# Patient Record
Sex: MALE | Race: WHITE | NOT HISPANIC OR LATINO | Employment: OTHER | URBAN - METROPOLITAN AREA
[De-identification: names, ages, dates, MRNs, and addresses within clinical notes are randomized per-mention and may not be internally consistent; named-entity substitution may affect disease eponyms.]

---

## 2021-12-30 PROBLEM — M48.062 NEUROGENIC CLAUDICATION DUE TO LUMBAR SPINAL STENOSIS: Status: ACTIVE | Noted: 2021-12-30

## 2022-01-03 ENCOUNTER — HOSPITAL ENCOUNTER (OUTPATIENT)
Dept: CARDIOLOGY | Facility: MEDICAL CENTER | Age: 72
End: 2022-01-03
Attending: NEUROLOGICAL SURGERY
Payer: COMMERCIAL

## 2022-01-03 ENCOUNTER — HOSPITAL ENCOUNTER (OUTPATIENT)
Dept: LAB | Facility: MEDICAL CENTER | Age: 72
End: 2022-01-03
Attending: NEUROLOGICAL SURGERY
Payer: COMMERCIAL

## 2022-01-03 ENCOUNTER — HOSPITAL ENCOUNTER (OUTPATIENT)
Dept: RADIOLOGY | Facility: MEDICAL CENTER | Age: 72
End: 2022-01-03
Attending: NEUROLOGICAL SURGERY
Payer: COMMERCIAL

## 2022-01-03 DIAGNOSIS — Z01.810 PRE-OPERATIVE CARDIOVASCULAR EXAMINATION: ICD-10-CM

## 2022-01-03 DIAGNOSIS — Z01.811 PRE-OPERATIVE RESPIRATORY EXAMINATION: ICD-10-CM

## 2022-01-03 LAB
ANION GAP SERPL CALC-SCNC: 12 MMOL/L (ref 7–16)
APPEARANCE UR: CLEAR
APTT PPP: 43.3 SEC (ref 24.7–36)
BASOPHILS # BLD AUTO: 0.7 % (ref 0–1.8)
BASOPHILS # BLD: 0.06 K/UL (ref 0–0.12)
BILIRUB UR QL STRIP.AUTO: NEGATIVE
BUN SERPL-MCNC: 18 MG/DL (ref 8–22)
CALCIUM SERPL-MCNC: 9.1 MG/DL (ref 8.5–10.5)
CHLORIDE SERPL-SCNC: 99 MMOL/L (ref 96–112)
CO2 SERPL-SCNC: 26 MMOL/L (ref 20–33)
COLOR UR: YELLOW
CREAT SERPL-MCNC: 0.8 MG/DL (ref 0.5–1.4)
EKG IMPRESSION: NORMAL
EOSINOPHIL # BLD AUTO: 0.17 K/UL (ref 0–0.51)
EOSINOPHIL NFR BLD: 2 % (ref 0–6.9)
ERYTHROCYTE [DISTWIDTH] IN BLOOD BY AUTOMATED COUNT: 43.9 FL (ref 35.9–50)
GLUCOSE SERPL-MCNC: 105 MG/DL (ref 65–99)
GLUCOSE UR STRIP.AUTO-MCNC: NEGATIVE MG/DL
HCT VFR BLD AUTO: 49.2 % (ref 42–52)
HGB BLD-MCNC: 16.8 G/DL (ref 14–18)
IMM GRANULOCYTES # BLD AUTO: 0.08 K/UL (ref 0–0.11)
IMM GRANULOCYTES NFR BLD AUTO: 1 % (ref 0–0.9)
INR PPP: 1 (ref 0.87–1.13)
KETONES UR STRIP.AUTO-MCNC: NEGATIVE MG/DL
LEUKOCYTE ESTERASE UR QL STRIP.AUTO: NEGATIVE
LYMPHOCYTES # BLD AUTO: 2.37 K/UL (ref 1–4.8)
LYMPHOCYTES NFR BLD: 28.2 % (ref 22–41)
MCH RBC QN AUTO: 31.7 PG (ref 27–33)
MCHC RBC AUTO-ENTMCNC: 34.1 G/DL (ref 33.7–35.3)
MCV RBC AUTO: 92.8 FL (ref 81.4–97.8)
MICRO URNS: NORMAL
MONOCYTES # BLD AUTO: 0.95 K/UL (ref 0–0.85)
MONOCYTES NFR BLD AUTO: 11.3 % (ref 0–13.4)
NEUTROPHILS # BLD AUTO: 4.78 K/UL (ref 1.82–7.42)
NEUTROPHILS NFR BLD: 56.8 % (ref 44–72)
NITRITE UR QL STRIP.AUTO: NEGATIVE
NRBC # BLD AUTO: 0 K/UL
NRBC BLD-RTO: 0 /100 WBC
PH UR STRIP.AUTO: 6.5 [PH] (ref 5–8)
PLATELET # BLD AUTO: 293 K/UL (ref 164–446)
PMV BLD AUTO: 10 FL (ref 9–12.9)
POTASSIUM SERPL-SCNC: 4.4 MMOL/L (ref 3.6–5.5)
PROT UR QL STRIP: NEGATIVE MG/DL
PROTHROMBIN TIME: 12.9 SEC (ref 12–14.6)
RBC # BLD AUTO: 5.3 M/UL (ref 4.7–6.1)
RBC UR QL AUTO: NEGATIVE
SODIUM SERPL-SCNC: 137 MMOL/L (ref 135–145)
SP GR UR STRIP.AUTO: 1.01
UROBILINOGEN UR STRIP.AUTO-MCNC: 0.2 MG/DL
WBC # BLD AUTO: 8.4 K/UL (ref 4.8–10.8)

## 2022-01-03 PROCEDURE — 93010 ELECTROCARDIOGRAM REPORT: CPT | Performed by: INTERNAL MEDICINE

## 2022-01-03 PROCEDURE — 71046 X-RAY EXAM CHEST 2 VIEWS: CPT

## 2022-01-03 PROCEDURE — 85730 THROMBOPLASTIN TIME PARTIAL: CPT | Mod: GA

## 2022-01-03 PROCEDURE — 81003 URINALYSIS AUTO W/O SCOPE: CPT

## 2022-01-03 PROCEDURE — 80048 BASIC METABOLIC PNL TOTAL CA: CPT

## 2022-01-03 PROCEDURE — 85610 PROTHROMBIN TIME: CPT | Mod: GA

## 2022-01-03 PROCEDURE — 85025 COMPLETE CBC W/AUTO DIFF WBC: CPT | Mod: GA

## 2022-01-03 PROCEDURE — 93005 ELECTROCARDIOGRAM TRACING: CPT

## 2022-01-03 PROCEDURE — 36415 COLL VENOUS BLD VENIPUNCTURE: CPT | Mod: GA

## 2022-01-04 ENCOUNTER — PRE-ADMISSION TESTING (OUTPATIENT)
Dept: ADMISSIONS | Facility: MEDICAL CENTER | Age: 72
End: 2022-01-04
Attending: NEUROLOGICAL SURGERY
Payer: COMMERCIAL

## 2022-01-05 ENCOUNTER — ANESTHESIA EVENT (OUTPATIENT)
Dept: SURGERY | Facility: MEDICAL CENTER | Age: 72
End: 2022-01-05
Payer: COMMERCIAL

## 2022-01-05 ENCOUNTER — APPOINTMENT (OUTPATIENT)
Dept: RADIOLOGY | Facility: MEDICAL CENTER | Age: 72
End: 2022-01-05
Attending: NEUROLOGICAL SURGERY
Payer: COMMERCIAL

## 2022-01-05 ENCOUNTER — HOSPITAL ENCOUNTER (OUTPATIENT)
Facility: MEDICAL CENTER | Age: 72
End: 2022-01-07
Attending: NEUROLOGICAL SURGERY | Admitting: NEUROLOGICAL SURGERY
Payer: COMMERCIAL

## 2022-01-05 ENCOUNTER — ANESTHESIA (OUTPATIENT)
Dept: SURGERY | Facility: MEDICAL CENTER | Age: 72
End: 2022-01-05
Payer: COMMERCIAL

## 2022-01-05 DIAGNOSIS — M48.062 NEUROGENIC CLAUDICATION DUE TO LUMBAR SPINAL STENOSIS: ICD-10-CM

## 2022-01-05 DIAGNOSIS — G89.18 POSTOPERATIVE PAIN: ICD-10-CM

## 2022-01-05 PROBLEM — M54.16 SPINAL STENOSIS OF LUMBAR REGION WITH RADICULOPATHY: Status: ACTIVE | Noted: 2022-01-05

## 2022-01-05 PROBLEM — M48.061 SPINAL STENOSIS OF LUMBAR REGION WITH RADICULOPATHY: Status: ACTIVE | Noted: 2022-01-05

## 2022-01-05 LAB
EXTERNAL QUALITY CONTROL: NORMAL
SARS-COV+SARS-COV-2 AG RESP QL IA.RAPID: NEGATIVE

## 2022-01-05 PROCEDURE — 700111 HCHG RX REV CODE 636 W/ 250 OVERRIDE (IP): Performed by: ANESTHESIOLOGY

## 2022-01-05 PROCEDURE — 63035 LAMOT DCMPRN NRV RT EA ADDL: CPT | Mod: 50 | Performed by: NEUROLOGICAL SURGERY

## 2022-01-05 PROCEDURE — 700111 HCHG RX REV CODE 636 W/ 250 OVERRIDE (IP): Performed by: NEUROLOGICAL SURGERY

## 2022-01-05 PROCEDURE — 700105 HCHG RX REV CODE 258: Performed by: ANESTHESIOLOGY

## 2022-01-05 PROCEDURE — 160035 HCHG PACU - 1ST 60 MINS PHASE I: Performed by: NEUROLOGICAL SURGERY

## 2022-01-05 PROCEDURE — A9270 NON-COVERED ITEM OR SERVICE: HCPCS | Performed by: ANESTHESIOLOGY

## 2022-01-05 PROCEDURE — 160029 HCHG SURGERY MINUTES - 1ST 30 MINS LEVEL 4: Performed by: NEUROLOGICAL SURGERY

## 2022-01-05 PROCEDURE — 502708 HCHG CATHETER, ON-Q SILVER SKR: Performed by: NEUROLOGICAL SURGERY

## 2022-01-05 PROCEDURE — 700111 HCHG RX REV CODE 636 W/ 250 OVERRIDE (IP): Performed by: PHYSICIAN ASSISTANT

## 2022-01-05 PROCEDURE — 72020 X-RAY EXAM OF SPINE 1 VIEW: CPT

## 2022-01-05 PROCEDURE — 96368 THER/DIAG CONCURRENT INF: CPT | Mod: XU

## 2022-01-05 PROCEDURE — 700101 HCHG RX REV CODE 250: Performed by: NEUROLOGICAL SURGERY

## 2022-01-05 PROCEDURE — 96375 TX/PRO/DX INJ NEW DRUG ADDON: CPT | Mod: XU

## 2022-01-05 PROCEDURE — A4306 DRUG DELIVERY SYSTEM <=50 ML: HCPCS | Performed by: NEUROLOGICAL SURGERY

## 2022-01-05 PROCEDURE — 700101 HCHG RX REV CODE 250: Performed by: ANESTHESIOLOGY

## 2022-01-05 PROCEDURE — 96366 THER/PROPH/DIAG IV INF ADDON: CPT | Mod: XU

## 2022-01-05 PROCEDURE — 160009 HCHG ANES TIME/MIN: Performed by: NEUROLOGICAL SURGERY

## 2022-01-05 PROCEDURE — 500002 HCHG ADHESIVE, DERMABOND: Performed by: NEUROLOGICAL SURGERY

## 2022-01-05 PROCEDURE — 110454 HCHG SHELL REV 250: Performed by: NEUROLOGICAL SURGERY

## 2022-01-05 PROCEDURE — G0378 HOSPITAL OBSERVATION PER HR: HCPCS

## 2022-01-05 PROCEDURE — 63030 LAMOT DCMPRN NRV RT 1 LMBR: CPT | Mod: 50 | Performed by: NEUROLOGICAL SURGERY

## 2022-01-05 PROCEDURE — 87426 SARSCOV CORONAVIRUS AG IA: CPT | Performed by: NEUROLOGICAL SURGERY

## 2022-01-05 PROCEDURE — 110371 HCHG SHELL REV 272: Performed by: NEUROLOGICAL SURGERY

## 2022-01-05 PROCEDURE — 700102 HCHG RX REV CODE 250 W/ 637 OVERRIDE(OP): Performed by: ANESTHESIOLOGY

## 2022-01-05 PROCEDURE — 160048 HCHG OR STATISTICAL LEVEL 1-5: Performed by: NEUROLOGICAL SURGERY

## 2022-01-05 PROCEDURE — 96365 THER/PROPH/DIAG IV INF INIT: CPT | Mod: XU

## 2022-01-05 PROCEDURE — 700101 HCHG RX REV CODE 250: Performed by: PHYSICIAN ASSISTANT

## 2022-01-05 PROCEDURE — 160002 HCHG RECOVERY MINUTES (STAT): Performed by: NEUROLOGICAL SURGERY

## 2022-01-05 PROCEDURE — A9270 NON-COVERED ITEM OR SERVICE: HCPCS | Performed by: PHYSICIAN ASSISTANT

## 2022-01-05 PROCEDURE — 160041 HCHG SURGERY MINUTES - EA ADDL 1 MIN LEVEL 4: Performed by: NEUROLOGICAL SURGERY

## 2022-01-05 PROCEDURE — 700102 HCHG RX REV CODE 250 W/ 637 OVERRIDE(OP): Performed by: PHYSICIAN ASSISTANT

## 2022-01-05 PROCEDURE — 501838 HCHG SUTURE GENERAL: Performed by: NEUROLOGICAL SURGERY

## 2022-01-05 PROCEDURE — 700106 HCHG RX REV CODE 271: Performed by: NEUROLOGICAL SURGERY

## 2022-01-05 DEVICE — GRAFT DURAMATRIX ONLAY PLUS 1IN X 1IN OR 2.7CM X 2.75CM: Type: IMPLANTABLE DEVICE | Site: SPINE LUMBAR | Status: FUNCTIONAL

## 2022-01-05 RX ORDER — OXYCODONE HCL 5 MG/5 ML
5 SOLUTION, ORAL ORAL
Status: COMPLETED | OUTPATIENT
Start: 2022-01-05 | End: 2022-01-05

## 2022-01-05 RX ORDER — BUPIVACAINE HYDROCHLORIDE AND EPINEPHRINE 5; 5 MG/ML; UG/ML
INJECTION, SOLUTION EPIDURAL; INTRACAUDAL; PERINEURAL
Status: DISCONTINUED | OUTPATIENT
Start: 2022-01-05 | End: 2022-01-05 | Stop reason: HOSPADM

## 2022-01-05 RX ORDER — DIPHENHYDRAMINE HYDROCHLORIDE 50 MG/ML
25 INJECTION INTRAMUSCULAR; INTRAVENOUS EVERY 6 HOURS PRN
Status: DISCONTINUED | OUTPATIENT
Start: 2022-01-05 | End: 2022-01-07 | Stop reason: HOSPADM

## 2022-01-05 RX ORDER — ONDANSETRON 2 MG/ML
4 INJECTION INTRAMUSCULAR; INTRAVENOUS
Status: DISCONTINUED | OUTPATIENT
Start: 2022-01-05 | End: 2022-01-05 | Stop reason: HOSPADM

## 2022-01-05 RX ORDER — SODIUM CHLORIDE, SODIUM LACTATE, POTASSIUM CHLORIDE, AND CALCIUM CHLORIDE .6; .31; .03; .02 G/100ML; G/100ML; G/100ML; G/100ML
IRRIGANT IRRIGATION
Status: DISCONTINUED | OUTPATIENT
Start: 2022-01-05 | End: 2022-01-05 | Stop reason: HOSPADM

## 2022-01-05 RX ORDER — LABETALOL HYDROCHLORIDE 5 MG/ML
10 INJECTION, SOLUTION INTRAVENOUS
Status: DISCONTINUED | OUTPATIENT
Start: 2022-01-05 | End: 2022-01-07 | Stop reason: HOSPADM

## 2022-01-05 RX ORDER — SODIUM CHLORIDE, SODIUM LACTATE, POTASSIUM CHLORIDE, CALCIUM CHLORIDE 600; 310; 30; 20 MG/100ML; MG/100ML; MG/100ML; MG/100ML
INJECTION, SOLUTION INTRAVENOUS
Status: DISCONTINUED | OUTPATIENT
Start: 2022-01-05 | End: 2022-01-05 | Stop reason: SURG

## 2022-01-05 RX ORDER — CEFAZOLIN SODIUM 1 G/3ML
INJECTION, POWDER, FOR SOLUTION INTRAMUSCULAR; INTRAVENOUS
Status: DISCONTINUED | OUTPATIENT
Start: 2022-01-05 | End: 2022-01-05 | Stop reason: HOSPADM

## 2022-01-05 RX ORDER — VANCOMYCIN HYDROCHLORIDE 1 G/20ML
INJECTION, POWDER, LYOPHILIZED, FOR SOLUTION INTRAVENOUS
Status: COMPLETED | OUTPATIENT
Start: 2022-01-05 | End: 2022-01-05

## 2022-01-05 RX ORDER — DIPHENHYDRAMINE HCL 25 MG
25 TABLET ORAL EVERY 6 HOURS PRN
Status: DISCONTINUED | OUTPATIENT
Start: 2022-01-05 | End: 2022-01-07 | Stop reason: HOSPADM

## 2022-01-05 RX ORDER — MIDAZOLAM HYDROCHLORIDE 1 MG/ML
INJECTION INTRAMUSCULAR; INTRAVENOUS PRN
Status: DISCONTINUED | OUTPATIENT
Start: 2022-01-05 | End: 2022-01-05 | Stop reason: SURG

## 2022-01-05 RX ORDER — DOCUSATE SODIUM 100 MG/1
100 CAPSULE, LIQUID FILLED ORAL 2 TIMES DAILY
Status: DISCONTINUED | OUTPATIENT
Start: 2022-01-05 | End: 2022-01-07 | Stop reason: HOSPADM

## 2022-01-05 RX ORDER — ROCURONIUM BROMIDE 10 MG/ML
INJECTION, SOLUTION INTRAVENOUS PRN
Status: DISCONTINUED | OUTPATIENT
Start: 2022-01-05 | End: 2022-01-05 | Stop reason: SURG

## 2022-01-05 RX ORDER — BISACODYL 10 MG
10 SUPPOSITORY, RECTAL RECTAL
Status: DISCONTINUED | OUTPATIENT
Start: 2022-01-05 | End: 2022-01-07 | Stop reason: HOSPADM

## 2022-01-05 RX ORDER — ONDANSETRON 2 MG/ML
4 INJECTION INTRAMUSCULAR; INTRAVENOUS EVERY 4 HOURS PRN
Status: DISCONTINUED | OUTPATIENT
Start: 2022-01-05 | End: 2022-01-07 | Stop reason: HOSPADM

## 2022-01-05 RX ORDER — TIZANIDINE 4 MG/1
2 TABLET ORAL 3 TIMES DAILY PRN
Status: DISCONTINUED | OUTPATIENT
Start: 2022-01-05 | End: 2022-01-07 | Stop reason: HOSPADM

## 2022-01-05 RX ORDER — ONDANSETRON 2 MG/ML
INJECTION INTRAMUSCULAR; INTRAVENOUS PRN
Status: DISCONTINUED | OUTPATIENT
Start: 2022-01-05 | End: 2022-01-05 | Stop reason: SURG

## 2022-01-05 RX ORDER — POLYETHYLENE GLYCOL 3350 17 G/17G
1 POWDER, FOR SOLUTION ORAL 2 TIMES DAILY PRN
Status: DISCONTINUED | OUTPATIENT
Start: 2022-01-05 | End: 2022-01-07 | Stop reason: HOSPADM

## 2022-01-05 RX ORDER — SODIUM CHLORIDE, SODIUM LACTATE, POTASSIUM CHLORIDE, CALCIUM CHLORIDE 600; 310; 30; 20 MG/100ML; MG/100ML; MG/100ML; MG/100ML
INJECTION, SOLUTION INTRAVENOUS CONTINUOUS
Status: DISCONTINUED | OUTPATIENT
Start: 2022-01-05 | End: 2022-01-05 | Stop reason: HOSPADM

## 2022-01-05 RX ORDER — AMOXICILLIN 250 MG
1 CAPSULE ORAL NIGHTLY
Status: DISCONTINUED | OUTPATIENT
Start: 2022-01-05 | End: 2022-01-07 | Stop reason: HOSPADM

## 2022-01-05 RX ORDER — SODIUM CHLORIDE AND POTASSIUM CHLORIDE 150; 900 MG/100ML; MG/100ML
INJECTION, SOLUTION INTRAVENOUS CONTINUOUS
Status: DISCONTINUED | OUTPATIENT
Start: 2022-01-05 | End: 2022-01-07 | Stop reason: HOSPADM

## 2022-01-05 RX ORDER — CEFAZOLIN SODIUM 2 G/100ML
2 INJECTION, SOLUTION INTRAVENOUS EVERY 8 HOURS
Status: COMPLETED | OUTPATIENT
Start: 2022-01-06 | End: 2022-01-06

## 2022-01-05 RX ORDER — ONDANSETRON 4 MG/1
4 TABLET, ORALLY DISINTEGRATING ORAL EVERY 4 HOURS PRN
Status: DISCONTINUED | OUTPATIENT
Start: 2022-01-05 | End: 2022-01-07 | Stop reason: HOSPADM

## 2022-01-05 RX ORDER — AMOXICILLIN 250 MG
1 CAPSULE ORAL
Status: DISCONTINUED | OUTPATIENT
Start: 2022-01-05 | End: 2022-01-07 | Stop reason: HOSPADM

## 2022-01-05 RX ORDER — HALOPERIDOL 5 MG/ML
1 INJECTION INTRAMUSCULAR
Status: DISCONTINUED | OUTPATIENT
Start: 2022-01-05 | End: 2022-01-05 | Stop reason: HOSPADM

## 2022-01-05 RX ORDER — ENEMA 19; 7 G/133ML; G/133ML
1 ENEMA RECTAL
Status: DISCONTINUED | OUTPATIENT
Start: 2022-01-05 | End: 2022-01-07 | Stop reason: HOSPADM

## 2022-01-05 RX ORDER — MAGNESIUM HYDROXIDE 1200 MG/15ML
LIQUID ORAL
Status: COMPLETED | OUTPATIENT
Start: 2022-01-05 | End: 2022-01-05

## 2022-01-05 RX ORDER — DIPHENHYDRAMINE HYDROCHLORIDE 50 MG/ML
12.5 INJECTION INTRAMUSCULAR; INTRAVENOUS
Status: DISCONTINUED | OUTPATIENT
Start: 2022-01-05 | End: 2022-01-05 | Stop reason: HOSPADM

## 2022-01-05 RX ORDER — CEFAZOLIN SODIUM 1 G/3ML
2 INJECTION, POWDER, FOR SOLUTION INTRAMUSCULAR; INTRAVENOUS ONCE
Status: COMPLETED | OUTPATIENT
Start: 2022-01-05 | End: 2022-01-05

## 2022-01-05 RX ORDER — OXYCODONE HCL 5 MG/5 ML
10 SOLUTION, ORAL ORAL
Status: COMPLETED | OUTPATIENT
Start: 2022-01-05 | End: 2022-01-05

## 2022-01-05 RX ADMIN — DOCUSATE SODIUM 50 MG AND SENNOSIDES 8.6 MG 1 TABLET: 8.6; 5 TABLET, FILM COATED ORAL at 23:16

## 2022-01-05 RX ADMIN — DOCUSATE SODIUM 100 MG: 100 CAPSULE ORAL at 23:17

## 2022-01-05 RX ADMIN — OXYCODONE HYDROCHLORIDE 10 MG: 5 SOLUTION ORAL at 21:55

## 2022-01-05 RX ADMIN — ONDANSETRON 4 MG: 2 INJECTION INTRAMUSCULAR; INTRAVENOUS at 17:46

## 2022-01-05 RX ADMIN — MIDAZOLAM HYDROCHLORIDE 2 MG: 1 INJECTION, SOLUTION INTRAMUSCULAR; INTRAVENOUS at 17:46

## 2022-01-05 RX ADMIN — CEFAZOLIN 2 G: 330 INJECTION, POWDER, FOR SOLUTION INTRAMUSCULAR; INTRAVENOUS at 17:48

## 2022-01-05 RX ADMIN — FENTANYL CITRATE 25 MCG: 50 INJECTION INTRAMUSCULAR; INTRAVENOUS at 22:15

## 2022-01-05 RX ADMIN — SUFENTANIL CITRATE 50 MCG: 50 INJECTION EPIDURAL; INTRAVENOUS at 17:46

## 2022-01-05 RX ADMIN — SODIUM CHLORIDE, POTASSIUM CHLORIDE, SODIUM LACTATE AND CALCIUM CHLORIDE: 600; 310; 30; 20 INJECTION, SOLUTION INTRAVENOUS at 19:09

## 2022-01-05 RX ADMIN — FENTANYL CITRATE 25 MCG: 50 INJECTION INTRAMUSCULAR; INTRAVENOUS at 22:20

## 2022-01-05 RX ADMIN — FENTANYL CITRATE 25 MCG: 50 INJECTION INTRAMUSCULAR; INTRAVENOUS at 22:07

## 2022-01-05 RX ADMIN — CEFAZOLIN SODIUM 2 G: 2 INJECTION, SOLUTION INTRAVENOUS at 23:16

## 2022-01-05 RX ADMIN — FENTANYL CITRATE 25 MCG: 50 INJECTION INTRAMUSCULAR; INTRAVENOUS at 22:11

## 2022-01-05 RX ADMIN — MORPHINE SULFATE: 50 INJECTION, SOLUTION, CONCENTRATE INTRAVENOUS at 23:34

## 2022-01-05 RX ADMIN — POTASSIUM CHLORIDE AND SODIUM CHLORIDE: 900; 150 INJECTION, SOLUTION INTRAVENOUS at 23:16

## 2022-01-05 RX ADMIN — ROCURONIUM BROMIDE 25 MG: 10 INJECTION, SOLUTION INTRAVENOUS at 17:47

## 2022-01-05 RX ADMIN — SODIUM CHLORIDE, POTASSIUM CHLORIDE, SODIUM LACTATE AND CALCIUM CHLORIDE: 600; 310; 30; 20 INJECTION, SOLUTION INTRAVENOUS at 17:30

## 2022-01-05 RX ADMIN — PROPOFOL 150 MG: 10 INJECTION, EMULSION INTRAVENOUS at 17:47

## 2022-01-05 ASSESSMENT — PAIN DESCRIPTION - PAIN TYPE
TYPE: SURGICAL PAIN
TYPE: SURGICAL PAIN

## 2022-01-05 ASSESSMENT — PATIENT HEALTH QUESTIONNAIRE - PHQ9
1. LITTLE INTEREST OR PLEASURE IN DOING THINGS: NOT AT ALL
SUM OF ALL RESPONSES TO PHQ9 QUESTIONS 1 AND 2: 0
2. FEELING DOWN, DEPRESSED, IRRITABLE, OR HOPELESS: NOT AT ALL

## 2022-01-05 ASSESSMENT — PAIN SCALES - GENERAL: PAIN_LEVEL: 0

## 2022-01-05 NOTE — LETTER
December 30, 2021        Patient Name: Lico Caba  Surgeon Name: Allyson Sutton M.D.  Surgery Facility: [facility] ([address] [place] [address])  Surgery Date: 1/5/2022    Your surgery check in time is at 2:00pm and surgery is approximately 5:00pm.    BEFORE YOUR SURGERY    *COVID-19 TESTS are now MANDATORY and must be completed 4-7 days prior to your surgery date. All facilities have testing capabilities and can provide the test, or you can have one completed at your local pharmacy. At-Home tests are NOT ACCEPTED.    Continue taking all lifesaving medications, including the morning of your surgery with small sip of water.    Please read the MEDICATION INSTRUCTIONS below completely.    DAY OF YOUR SURGERY    Nothing to eat or drink and refrain from smoking any substance after midnight the day of your surgery. Smoking may interfere with the anesthetic and frequently produces nausea during the recovery period.    Please arrive at the hospital/surgery center at the check-in time provided.     You will need a responsible adult to drive you to and from your surgery.    AFTER YOUR SURGERY    2 Week Post op Appointment:   Date: 1.19.22   Time: 4:00pm   With: Lul West PA-C   Location: [address] [place] [address], [facility] 70    6 Week Post op Appointment:   Date: 2.24.22   Time: 2:45pm   With: Lul West PA-C   Location: [address] [place] [address] 70      MEDICATION INSTRUCTIONS Do not take these medications prior to your procedure:  • Anti-inflammatories: stop 7 days prior, restart when advised. For fusions avoid for 12 weeks after surgery  o Naproxen (Naprosyn or Alleve)  o Motrin  o Ibuprofen  o Nabumetone (Relafen)  o Meloxicam (Mobic)  o Celebrex  o Salsalate  o Diclofenac (Arthrotec, Voltaren, Flector)  o Sulindac (Clinoril  o Etodolac (Lodine)  o Indomethacin (Indocin)  o Ketoprofen  o Ketorolac  o Oxaprozin (Daypro)  o Piroxicam (Feldene  o Blood thinners (stop after approval from the prescribing  physician)  • Aspirin (any dosage): 10 days prior, restart 7 days after procedure  o Any medications that contain aspirin in combination (ie: Excedrin migraine, Fiorinal, and Norgesic)  • Warfarin (Coumadin): Stop 7 days prior, INR day of procedure, restart 2-3 weeks after procedure  • Antiplatelet: restart 7 days after procedure  o Ticlid (ticlopidine): Stop 14 days prior  o  Plavix (clopidogrel): Stop 14 days prior  o Aggrenox or Dipyridamole: Stop 14 days prior  o ReoPro (abciximab): Stop 14 days prior  o Integrilin (eptifibatide): Stop 14 days prior  • Aggrastat (tirofiban): Stop 14 days prior  • Lovenox (Enoxaparin): Stop 24hrs before and restart 24hrs after procedure  • Heparin: Stop 24hrs before   • Dalteparin (Fragmin): Stop 24hrs before  • Fondaparinux (Arixtra): Stop 24hrs before  • Xeralto, Dabigatran (Pradaxa) Stop 7 days prior  • Eliquis (apibaxan) Stop 7 days prior  Okay to take these medications as prescribed:  • Muscle relaxers  • Acetaminophen and pain medications that have it in addition to oxycodone and hydrocodone  • Blood pressure, cholesterol and diabetes medications are ok    TIME OFF WORK    FMLA or Disability forms can be faxed directly to [phone] or you may drop them off at any [facility]. Our office charges a $35.00 fee. Forms will be completed within 5-10 business days after payment is received. For the status of your forms you may contact our disability office directly at [phone].    DENTAL PROCDURES/CLEANINGS Avoid 3 weeks before surgery and for 3 months after surgery.    QUESTIONS ABOUT COSTS  Contact our Patient Financial Services department at [phone] for more information.    If you have any questions, please contact our office.    [facility]  [address], [address]  [place] [address] [phone]      Nena Rangel   Surgery Scheduler  ? [phone]   Fax: [phone]  [address]  [address]  [place] [address] [phone]  [phone]

## 2022-01-05 NOTE — LETTER
December 30, 2021      Patient Name: Lico Caba  Surgeon Name: Allyson Sutton M.D.  Surgery Facility: [facility] ([address] [place] [address])  Surgery Date: 1/5/2022    Your surgery check in time is at 2:00pm and surgery is approximately 5:00pm.    BEFORE YOUR SURGERY    *COVID-19 TESTS are now MANDATORY and must be completed 4-7 days prior to your surgery date. All facilities have testing capabilities and can provide the test, or you can have one completed at your local pharmacy. At-Home tests are NOT ACCEPTED.    Continue taking all lifesaving medications, including the morning of your surgery with small sip of water.    Please read the MEDICATION INSTRUCTIONS below completely.    DAY OF YOUR SURGERY    Nothing to eat or drink and refrain from smoking any substance after midnight the day of your surgery. Smoking may interfere with the anesthetic and frequently produces nausea during the recovery period.    Please arrive at the hospital/surgery center at the check-in time provided.     You will need a responsible adult to drive you to and from your surgery.    AFTER YOUR SURGERY    2 Week Post op Appointment:   Date: 1.19.22   Time: 4:00pm   With: Lul West PA-C   Location: [address] [place] [address], [facility] 701    6 Week Post op Appointment:   Date: 2.24.22   Time: 10:00am   With: Rick Monterroso PA-C   Location: [address] [place] [address], [facility] 70      MEDICATION INSTRUCTIONS Do not take these medications prior to your procedure:  • Anti-inflammatories: stop 7 days prior, restart when advised. For fusions avoid for 12 weeks after surgery  o Naproxen (Naprosyn or Alleve)  o Motrin  o Ibuprofen  o Nabumetone (Relafen)  o Meloxicam (Mobic)  o Celebrex  o Salsalate  o Diclofenac (Arthrotec, Voltaren, Flector)  o Sulindac (Clinoril  o Etodolac (Lodine)  o Indomethacin (Indocin)  o Ketoprofen  o Ketorolac  o Oxaprozin (Daypro)  o Piroxicam (Feldene  o Blood thinners (stop after approval from the prescribing  physician)  • Aspirin (any dosage): 10 days prior, restart 7 days after procedure  o Any medications that contain aspirin in combination (ie: Excedrin migraine, Fiorinal, and Norgesic)  • Warfarin (Coumadin): Stop 7 days prior, INR day of procedure, restart 2-3 weeks after procedure  • Antiplatelet: restart 7 days after procedure  o Ticlid (ticlopidine): Stop 14 days prior  o  Plavix (clopidogrel): Stop 14 days prior  o Aggrenox or Dipyridamole: Stop 14 days prior  o ReoPro (abciximab): Stop 14 days prior  o Integrilin (eptifibatide): Stop 14 days prior  • Aggrastat (tirofiban): Stop 14 days prior  • Lovenox (Enoxaparin): Stop 24hrs before and restart 24hrs after procedure  • Heparin: Stop 24hrs before   • Dalteparin (Fragmin): Stop 24hrs before  • Fondaparinux (Arixtra): Stop 24hrs before  • Xeralto, Dabigatran (Pradaxa) Stop 7 days prior  • Eliquis (apibaxan) Stop 7 days prior  Okay to take these medications as prescribed:  • Muscle relaxers  • Acetaminophen and pain medications that have it in addition to oxycodone and hydrocodone  • Blood pressure, cholesterol and diabetes medications are ok    TIME OFF WORK    FMLA or Disability forms can be faxed directly to [phone] or you may drop them off at any [facility]. Our office charges a $35.00 fee. Forms will be completed within 5-10 business days after payment is received. For the status of your forms you may contact our disability office directly at [phone].    DENTAL PROCDURES/CLEANINGS Avoid 3 weeks before surgery and for 3 months after surgery.    QUESTIONS ABOUT COSTS  Contact our Patient Financial Services department at [phone] for more information.    If you have any questions, please contact our office.    [facility]  [address]  [place] [address] [phone]      Nena Rangel   Surgery Scheduler  ? [phone]   Fax: [phone]    [address]  [place] [address] [phone]  [phone]

## 2022-01-06 LAB
ANION GAP SERPL CALC-SCNC: 10 MMOL/L (ref 7–16)
BUN SERPL-MCNC: 13 MG/DL (ref 8–22)
CALCIUM SERPL-MCNC: 7.9 MG/DL (ref 8.5–10.5)
CHLORIDE SERPL-SCNC: 105 MMOL/L (ref 96–112)
CO2 SERPL-SCNC: 23 MMOL/L (ref 20–33)
CREAT SERPL-MCNC: 0.84 MG/DL (ref 0.5–1.4)
ERYTHROCYTE [DISTWIDTH] IN BLOOD BY AUTOMATED COUNT: 44.2 FL (ref 35.9–50)
GLUCOSE SERPL-MCNC: 110 MG/DL (ref 65–99)
HCT VFR BLD AUTO: 42.2 % (ref 42–52)
HGB BLD-MCNC: 14.3 G/DL (ref 14–18)
MCH RBC QN AUTO: 31.7 PG (ref 27–33)
MCHC RBC AUTO-ENTMCNC: 33.9 G/DL (ref 33.7–35.3)
MCV RBC AUTO: 93.6 FL (ref 81.4–97.8)
PLATELET # BLD AUTO: 209 K/UL (ref 164–446)
PMV BLD AUTO: 9.7 FL (ref 9–12.9)
POTASSIUM SERPL-SCNC: 4.2 MMOL/L (ref 3.6–5.5)
RBC # BLD AUTO: 4.51 M/UL (ref 4.7–6.1)
SODIUM SERPL-SCNC: 138 MMOL/L (ref 135–145)
WBC # BLD AUTO: 8.2 K/UL (ref 4.8–10.8)

## 2022-01-06 PROCEDURE — 700102 HCHG RX REV CODE 250 W/ 637 OVERRIDE(OP): Performed by: PHYSICIAN ASSISTANT

## 2022-01-06 PROCEDURE — A9270 NON-COVERED ITEM OR SERVICE: HCPCS | Performed by: PHYSICIAN ASSISTANT

## 2022-01-06 PROCEDURE — 99024 POSTOP FOLLOW-UP VISIT: CPT | Performed by: PHYSICIAN ASSISTANT

## 2022-01-06 PROCEDURE — 63030 LAMOT DCMPRN NRV RT 1 LMBR: CPT | Mod: ASROC,50ROC | Performed by: PHYSICIAN ASSISTANT

## 2022-01-06 PROCEDURE — 97161 PT EVAL LOW COMPLEX 20 MIN: CPT

## 2022-01-06 PROCEDURE — 51798 US URINE CAPACITY MEASURE: CPT

## 2022-01-06 PROCEDURE — 97535 SELF CARE MNGMENT TRAINING: CPT

## 2022-01-06 PROCEDURE — 97165 OT EVAL LOW COMPLEX 30 MIN: CPT

## 2022-01-06 PROCEDURE — 700111 HCHG RX REV CODE 636 W/ 250 OVERRIDE (IP): Performed by: PHYSICIAN ASSISTANT

## 2022-01-06 PROCEDURE — G0378 HOSPITAL OBSERVATION PER HR: HCPCS

## 2022-01-06 PROCEDURE — 96366 THER/PROPH/DIAG IV INF ADDON: CPT

## 2022-01-06 PROCEDURE — 36415 COLL VENOUS BLD VENIPUNCTURE: CPT

## 2022-01-06 PROCEDURE — 63035 LAMOT DCMPRN NRV RT EA ADDL: CPT | Mod: ASROC | Performed by: PHYSICIAN ASSISTANT

## 2022-01-06 PROCEDURE — 80048 BASIC METABOLIC PNL TOTAL CA: CPT

## 2022-01-06 PROCEDURE — 85027 COMPLETE CBC AUTOMATED: CPT

## 2022-01-06 RX ORDER — OXYCODONE HYDROCHLORIDE AND ACETAMINOPHEN 5; 325 MG/1; MG/1
1-2 TABLET ORAL EVERY 4 HOURS PRN
Status: DISCONTINUED | OUTPATIENT
Start: 2022-01-06 | End: 2022-01-07 | Stop reason: HOSPADM

## 2022-01-06 RX ORDER — OXYCODONE HYDROCHLORIDE AND ACETAMINOPHEN 5; 325 MG/1; MG/1
1 TABLET ORAL EVERY 4 HOURS PRN
Qty: 42 TABLET | Refills: 0 | Status: SHIPPED | OUTPATIENT
Start: 2022-01-06 | End: 2022-01-13

## 2022-01-06 RX ORDER — TIZANIDINE 2 MG/1
2-4 TABLET ORAL 3 TIMES DAILY PRN
Qty: 45 TABLET | Refills: 0 | Status: SHIPPED | OUTPATIENT
Start: 2022-01-06

## 2022-01-06 RX ORDER — TAMSULOSIN HYDROCHLORIDE 0.4 MG/1
0.4 CAPSULE ORAL
Status: DISCONTINUED | OUTPATIENT
Start: 2022-01-06 | End: 2022-01-07 | Stop reason: HOSPADM

## 2022-01-06 RX ORDER — CEPHALEXIN 500 MG/1
500 CAPSULE ORAL 4 TIMES DAILY
Qty: 20 CAPSULE | Refills: 0 | Status: SHIPPED | OUTPATIENT
Start: 2022-01-06

## 2022-01-06 RX ADMIN — OXYCODONE HYDROCHLORIDE AND ACETAMINOPHEN 1 TABLET: 5; 325 TABLET ORAL at 08:46

## 2022-01-06 RX ADMIN — TAMSULOSIN HYDROCHLORIDE 0.4 MG: 0.4 CAPSULE ORAL at 16:50

## 2022-01-06 RX ADMIN — DOCUSATE SODIUM 100 MG: 100 CAPSULE ORAL at 17:53

## 2022-01-06 RX ADMIN — CEFAZOLIN SODIUM 2 G: 2 INJECTION, SOLUTION INTRAVENOUS at 10:44

## 2022-01-06 RX ADMIN — DOCUSATE SODIUM 50 MG AND SENNOSIDES 8.6 MG 1 TABLET: 8.6; 5 TABLET, FILM COATED ORAL at 20:18

## 2022-01-06 RX ADMIN — CHOLECALCIFEROL TAB 125 MCG (5000 UNIT) 5000 UNITS: 125 TAB at 05:19

## 2022-01-06 RX ADMIN — OXYCODONE HYDROCHLORIDE AND ACETAMINOPHEN 2 TABLET: 5; 325 TABLET ORAL at 20:18

## 2022-01-06 RX ADMIN — DOCUSATE SODIUM 100 MG: 100 CAPSULE ORAL at 05:19

## 2022-01-06 RX ADMIN — ONDANSETRON 4 MG: 4 TABLET, ORALLY DISINTEGRATING ORAL at 08:46

## 2022-01-06 ASSESSMENT — PATIENT HEALTH QUESTIONNAIRE - PHQ9
2. FEELING DOWN, DEPRESSED, IRRITABLE, OR HOPELESS: NOT AT ALL
SUM OF ALL RESPONSES TO PHQ9 QUESTIONS 1 AND 2: 0
1. LITTLE INTEREST OR PLEASURE IN DOING THINGS: NOT AT ALL

## 2022-01-06 ASSESSMENT — COGNITIVE AND FUNCTIONAL STATUS - GENERAL
SUGGESTED CMS G CODE MODIFIER DAILY ACTIVITY: CJ
MOVING TO AND FROM BED TO CHAIR: A LITTLE
STANDING UP FROM CHAIR USING ARMS: A LITTLE
MOVING FROM LYING ON BACK TO SITTING ON SIDE OF FLAT BED: A LITTLE
DAILY ACTIVITIY SCORE: 22
WALKING IN HOSPITAL ROOM: A LITTLE
CLIMB 3 TO 5 STEPS WITH RAILING: A LITTLE
SUGGESTED CMS G CODE MODIFIER MOBILITY: CK
HELP NEEDED FOR BATHING: A LITTLE
TURNING FROM BACK TO SIDE WHILE IN FLAT BAD: A LITTLE
MOBILITY SCORE: 18
DRESSING REGULAR LOWER BODY CLOTHING: A LITTLE

## 2022-01-06 ASSESSMENT — ACTIVITIES OF DAILY LIVING (ADL): TOILETING: INDEPENDENT

## 2022-01-06 ASSESSMENT — GAIT ASSESSMENTS
GAIT LEVEL OF ASSIST: SUPERVISED
DISTANCE (FEET): 145
DEVIATION: BRADYKINETIC;SHUFFLED GAIT
ASSISTIVE DEVICE: FRONT WHEEL WALKER

## 2022-01-06 ASSESSMENT — PAIN DESCRIPTION - PAIN TYPE
TYPE: SURGICAL PAIN

## 2022-01-06 NOTE — ANESTHESIA PROCEDURE NOTES
Airway    Date/Time: 1/5/2022 5:49 PM  Performed by: Eliot Shultz M.D.  Authorized by: Eliot Shultz M.D.     Location:  OR  Urgency:  Elective  Difficult Airway: No    Indications for Airway Management:  Anesthesia      Spontaneous Ventilation: absent    Sedation Level:  Deep  Preoxygenated: Yes    Patient Position:  Sniffing  Mask Difficulty Assessment:  1 - vent by mask  Final Airway Type:  Endotracheal airway  Final Endotracheal Airway:  ETT  Cuffed: Yes    Technique Used for Successful ETT Placement:  Video laryngoscopy    Insertion Site:  Oral  Blade Type:  Avery  Laryngoscope Blade/Videolaryngoscope Blade Size:  4  ETT Size (mm):  8.0  Measured from:  Lips  ETT to Lips (cm):  22  Placement Verified by: auscultation and capnometry    Cormack-Lehane Classification:  Grade III - view of epiglottis only  Number of Attempts at Approach:  1  Ventilation Between Attempts:  2 hand mask  Number of Other Approaches Attempted:  1  Unsuccessful Approach(es) for ETT:  Direct laryngoscopy

## 2022-01-06 NOTE — OP REPORT
1. DATE OF SURGERY: 1/5/2022    2. SURGEON:  Allyson Sutton MD, PhD, TANYA, Neurosurgeon    3. ASSISTANT:  NICOLE Martinez-Trung assistant was crucial to have during the case as the assistant helped with positioning, keeping the field clear of blood and retraction. This case could not be done easily without a qualified assistant. It was medically necessary    4. TYPE OF ANESTHESIA:  General anesthesia with endotracheal intubation    5. PREOPERATIVE DIAGNOSIS:      1.  Severe spinal stenosis L3-4 and L4-5 with new acute right sided disc herniation       6. POSTOPERATIVE DIAGNOSIS:      1.  Severe spinal stenosis L3-4 and L4-5 with new acute right sided disc herniation       7. HISTORY: See formal admission H and P    12/7/2021  This is a very nice 71-year-old man is troubled by back and right leg symptoms.  He has had problems for about 3 years.  [facility]  Usually is epidurals individuals and as well.  In October he developed leg weakness which is troubled him.  Typically when he sitting is good.  If he stands for period of time or walks he gets increasing back pain.  He gets buttock and right lateral thigh pain.  Occasionally to the right knee.  Occasionally on the left-hand side.  90% of symptoms on the right.  Has a positive shopping cart sign.  Flexion helps him.  He has had epidurals every 6 months.  He has had physical therapy.  Takes ibuprofen.  In October he started to feel the right leg becoming weaker.     In general health point of view is pretty healthy.  He is a  by trade.  He is very active.  He likes play golf.  He likes to bike.  He likes to hike.  He was accompanied by his wife today.     12/30/2021  Patient flareup.  He had severe right leg pain on the weekend.  He needed a steroid pack and a Medrol Dosepak.  He continues to get flareups of pain.  He is on gabapentin.  He is on meloxicam.  He had a steroid pack.  Taking tramadol.  His updated MRI scan shows an acute  right L3-4 disc herniation in addition to the stenosis.  He is failed all conservative measures.  I think surgery is reasonable.      8. PREOPERATIVE PHYSICAL EXAMINATION: See formal admission H and P    12/7/2021  When I examined him he was neurologically intact.      9. TITLE OF PROCEDURE:  •   • L3 Decompressive lumbar laminectomy with bilateral foraminotomies  • L4 Decompressive lumbar laminectomy with bilateral foraminotomies  • L5 Decompressive lumbar laminectomy with bilateral foraminotomies  • right L3-4 discectomy   • Bilateral L4-5 discectomies  •  i/o On-Q Pain catheters in paraspinal musculature  • Microscopic magnification    10. OPERATIVE FINDINGS:  The main pathology was an acute disc herniation on the right at L3-4.  There was an inferiorly migrated fragment.  He also had a broad-based disc bulge at L4-5 bilaterally.  I had to remove this in a piecemeal fashion.  It was very stuck to the dura in the axillary of the right L4 nerve root and there is a pinhole dural tear with minimal CSF leakage which had to be repeated.  There is no major loss of CSF.  There is no nerve root injury..      He also had  lateral recess stenosis due to a combination of ligamentous hypertrophy and facet arthropathy.     11. OPERATED LEVELS: as above    12. IMPLANTS USED: Nil    13. COMPLICATIONS: Pinhole dural tear in the axilla of the right L4 nerve root.  There was minimal sees minimal CSF leakage.  There is no nerve root injury.  This was well repaired.    14. ESTIMATED BLOOD LOSS:       50 mL      15. OPERATIVE DETAILS:  After fully informed consent, the patient was brought to the operating room.  General anesthesia was administered.  The patient was given intravenous antibiotic.  The patient was then turned prone onto the OSI operating table  and Flavio frame.  All pressure points were padded.  Initial fluoroscopic localization was taken for skin marking.  The posterior lumbar region was prepped and draped in a  standard fashion.      Using the initial x-ray as a guide, a midline skin incision was then affected.  This involved the spinous processes at the affected levels where the laminectomies were to be done.  A bilateral subperiosteal exposure was affected.  Great care was taken not to violate the facet joints.      Once the exposure had been done, self-retaining retractors were placed.  Hemostasis was obtained.  A repeat lateral x-ray was then taken to confirm the level.  Laminectomy was affected. The laminectomy was affected with Leksell rongeurs initially, then I used an AM-8 drill bit under microscopic magnification and illumination to thin down the lamina.      Once the lamina was paper thin, I used combination of 4, 3, and 2 mm Kerrison punches to remove the remaining bone and ligamentum flavum.  I worked from a cranial to a caudal direction so that I was in the line of the nerve roots.  Initially, a central decompression was affected, then in an undercutting fashion, I removed the ligamentum flavum and lateral recesses with great care not to take too much of the facet joints.      The laminectomy involve the inferior two thirds of L3, L4 and a half of L5.    Under the microscope with gentle medial retraction of the right L4 nerve root the right L3-4 disc and it was incised and removed in a piecemeal fashion.  There was a large inferiorly migrated subligamentous protrusion.  This was removed in a piecemeal fashion.      I did this similarly at L4-5 bilaterally.  This disc bulge was more calcified.  It was moved in a piecemeal fashion.  The disc spaces were not explored. On the left side the dura was very adherent to the disc protrusion.  There is a pinhole dural tear.  This is in the axillary lateral right L4 nerve root.  This is oversewn with a 6-0 Whitehorse-Lavell.  I placed a muscle plug of placed a DuraGen patch over this and then dural sealant.  We did a Valsalva which was negative.    Sequentially, I followed the L3,  L4, L5 nerve roots on both sides and a complete decompression was affected.      Hemostasis was obtained.  All the nerve roots were free.  I could pass a blunt dissector at each of the foramina with ease.  Hemostasis was obtained with a combination of Gelfoam and diathermy.  I then placed a vancomycin powder over the wound, but not on the dura.  A suction subfascial Hemovac was placed.       I placed On-Q pain catheters bilaterally in the paraspinal musculature through stab incisions.    I placed local anaesthetic throughout the paraspinal muscles.       The wound was then closed using multiple interrupted Vicryl sutures starting with 0 Vicryl sutures for the deep fascia, 2-0 Vicryl sutures for the sub-dermis and then to a vertical mattress nylon for the skin.    Hemostasis was obtained.  A 2-0 vicryl was used for the drain.  Dermabond was placed over the puncture sites of the On-Q catheters.  A dressing was applied.      All counts were correct at the end of the case and all instrumentation was accounted for.  The patient was then carefully turned supine again onto a regular operating table without incident.        16 PROGNOSIS:  The surgery went well.  We will keep the patient on bedrest overnight and try to get him home tomorrow    We plan to get the patient home in the next 24 to 48 hours. When the patient goes home, he/she will be discharged home on narcotic analgesia,  a muscle relaxant and oral antibiotic, usually Keflex.  The plan will be to follow up in 2 weeks in the office.  We will call the patient next week to ensure there are not any problems.  He/she can shower in 72 hours but is instructed to keep the wound dry.  The patient has also been instructed to abstain from smoking or any anti-inflammatories.     Allyson Sutton MD, PhD, TANYA, Neurosurgeon

## 2022-01-06 NOTE — THERAPY
"Occupational Therapy   Initial Evaluation     Patient Name: Lico Caba  Age:  71 y.o., Sex:  male  Medical Record #: [id number]  Today's Date: 1/6/2022    Precautions: Spinal / Back Precautions   Comments: no brace per order    Assessment  Patient is 71 y.o. male evaluated for functional deficits s/p L3-5 laminectomy. Pt presents to OT eval with supportive spouse at bedside, able to demonstrate basic functional transfers and ADLs at SPV level. Pt spouse able to assist as needed with LB dress and IADLs while pt recovers. Reviewed post-op precautions and provided education on compensatory strategies for ADLs. Post-op packet provided for reference of education, pt and spouse very receptive. No additional OT needs at this time.     Plan    Recommend Occupational Therapy for Evaluation only    DC Equipment Recommendations: Front-Wheel Walker (pt has shower chair)  Discharge Recommendations: Anticipate that the patient will have no further occupational therapy needs after discharge from the hospital     Subjective    \"I feel steady.\"     Objective       01/06/22 1025   Prior Living Situation   Prior Services None   Housing / Facility 1 Story House   Steps Into Home 0   Bathroom Set up Walk In Shower;Shower Chair   Equipment Owned Front-Wheel Walker;Raised Toilet Seat With Arms   Lives with - Patient's Self Care Capacity Spouse   Comments wife at bedside   Prior Level of ADL Function   Self Feeding Independent   Grooming / Hygiene Independent   Bathing Independent   Dressing Independent   Toileting Independent   Prior Level of IADL Function   Comments independent   Precautions   Precautions Spinal / Back Precautions    Comments no brace   Pain 0 - 10 Group   Therapist Pain Assessment Post Activity Pain Same as Prior to Activity;Nurse Notified   Cognition    Cognition / Consciousness WDL   Level of Consciousness Alert   Comments receptive to [facility]   Balance Assessment   Sitting Balance (Static) Fair +   Sitting " Balance (Dynamic) Fair +   Standing Balance (Static) Fair +   Standing Balance (Dynamic) Fair   Weight Shift Sitting Good   Weight Shift Standing Fair   Comments FWW   Bed Mobility    Supine to Sit Supervised   Sit to Supine Supervised   Scooting Supervised   Comments log roll   ADL Assessment   Eating Supervision   Grooming Supervision;Standing   Upper Body Dressing Supervision   Lower Body Dressing Minimal Assist   How much help from another person does the patient currently need...   6 Clicks Daily Activity Score 22   Functional Mobility   Sit to Stand Supervised   Bed, Chair, Wheelchair Transfer Supervised   Mobility w/FWW   Activity Tolerance   Sitting in Chair functional   Sitting Edge of Bed functional   Standing functional   Education Group   Education Provided Role of Occupational Therapist;Activities of Daily Living   Role of Occupational Therapist Patient Response Patient;Acceptance;Explanation;Verbal Demonstration   ADL Patient Response Patient;Acceptance;Explanation;Verbal Demonstration;Action Demonstration   Problem List   Problem List None

## 2022-01-06 NOTE — ANESTHESIA POSTPROCEDURE EVALUATION
Patient: Lico Caba    Procedure Summary     Date: 01/05/22 Room / Location: [facility] 07 / SURGERY [facility]    Anesthesia Start: 1743 Anesthesia Stop: 1926    Procedures:       L3/4 and L4/5  decompressive laminectomy and right L3/4 discectomy, L3/4 and L4/5 bilateral foraminotomies (Spine Lumbar)      DECOMPRESSION, SPINE, LUMBAR (Spine Lumbar)      FORAMINOTOMY, SPINE (Spine Lumbar) Diagnosis:       Neurogenic claudication due to lumbar spinal stenosis      (Neurogenic claudication due to lumbar spinal stenosis [M48.062])    Surgeons: Allyson Sutton M.D. Responsible Provider: Eliot Shultz M.D.    Anesthesia Type: general ASA Status: 2          Final Anesthesia Type: general  Last vitals  BP   Blood Pressure : 149/87    Temp   36.8 °C (98.2 °F)    Pulse   70   Resp   16    SpO2   93 %      Anesthesia Post Evaluation    Patient location during evaluation: PACU  Patient participation: complete - patient participated  Level of consciousness: awake and alert  Pain score: 0    Airway patency: patent  Anesthetic complications: no  Cardiovascular status: hemodynamically stable  Respiratory status: acceptable  Hydration status: euvolemic    PONV: none          There were no known complications for this encounter.

## 2022-01-06 NOTE — ANESTHESIA PREPROCEDURE EVALUATION
Case: [id number] Date/Time: 01/05/22 4773    Procedures:       L3/4 and L4/5  decompressive laminectomy and right L3/4 discectomy, L3/4 and L4/5 bilateral foraminotomies (Spine Lumbar)      DECOMPRESSION, SPINE, LUMBAR (Spine Lumbar)      FORAMINOTOMY, SPINE (Spine Lumbar)    Anesthesia type: General    Diagnosis: Neurogenic claudication due to lumbar spinal stenosis [M48.062]    Pre-op diagnosis: Neurogenic claudication due to lumbar spinal stenosis [M48.062]    Location: [place] [address] / SURGERY [facility]    Surgeons: Allyson Sutton M.D.          Relevant Problems   No relevant active problems       Physical Exam    Airway   Mallampati: II  TM distance: >3 FB  Neck ROM: full       Cardiovascular - normal exam  Rhythm: regular  Rate: normal  (-) murmur     Dental - normal exam        Facial Hair   Pulmonary - normal exam  Breath sounds clear to auscultation     Abdominal    Neurological - normal exam                 Anesthesia Plan    ASA 2       Plan - general       Airway plan will be ETT          Induction: intravenous    Postoperative Plan: Postoperative administration of opioids is intended.    Pertinent diagnostic labs and testing reviewed    Informed Consent:    Anesthetic plan and risks discussed with patient.    Use of blood products discussed with: patient whom consented to blood products.

## 2022-01-06 NOTE — PROGRESS NOTES
Report from nursing that patient only voided 10cc today. Adding flomax. He needs to void prior to discharge. If he remains in retention continue to straight cath tonight. Do not replace aguilar.

## 2022-01-06 NOTE — CARE PLAN
The patient is Stable - Low risk of patient condition declining or worsening    Shift Goals  Clinical Goals: pain control, DC  Patient Goals: pain control, DC  Family Goals: DC    Progress made toward(s) clinical / shift goals:    Problem: Pain - Standard  Goal: Alleviation of pain or a reduction in pain to the patient’s comfort goal  Outcome: Progressing     Problem: Knowledge Deficit - Standard  Goal: Patient and family/care givers will demonstrate understanding of plan of care, disease process/condition, diagnostic tests and medications  Outcome: Progressing       Patient is not progressing towards the following goals:

## 2022-01-06 NOTE — PROGRESS NOTES
Neurosurgery Progress Note    Subjective:  POD #1 seen with Dr. Sutton  Has been on bedrest overnight   -subtle spinal fluid leak intraoperatively  Drain removed last night per orders  Denies h/a  Some surgical site pain    Exam:  Wound: C/d/I, no drainage  Labs stable  VSS  LE motor 5/5 throughout bilaterally    BP  Min: 105/58  Max: 164/77  Pulse  Av.6  Min: 57  Max: 76  Resp  Avg: 15.4  Min: 8  Max: 24  Temp  Av.4 °C (97.5 °F)  Min: 36 °C (96.8 °F)  Max: 36.9 °C (98.4 °F)  SpO2  Av.4 %  Min: 90 %  Max: 99 %    No data recorded    Recent Labs     22  0502   WBC 8.2   RBC 4.51*   HEMOGLOBIN 14.3   HEMATOCRIT 42.2   MCV 93.6   MCH 31.7   MCHC 33.9   RDW 44.2   PLATELETCT 209   MPV 9.7     Recent Labs     22  0502   SODIUM 138   POTASSIUM 4.2   CHLORIDE 105   CO2 23   GLUCOSE 110*   BUN 13   CREATININE 0.84   CALCIUM 7.9*               Intake/Output                       22 0700 - 22 0659 22 07 - 22 0659      5325-5323 Total  0002-4321 Total                 Intake    I.V.  --  1050 1050  --  -- --    Volume (mL) (Lactated Ringers) -- 1050 1050 -- -- --    Total Intake -- 1050 1050 -- -- --       Output    Urine  --  1445 1445  --  -- --    Urine -- 175 175 -- -- --    Output (mL) (Urethral Catheter Non-latex 16 Fr.) -- 1270 1270 -- -- --    Drains  --  125 125  --  -- --    Output (mL) ([REMOVED] Closed/Suction Drain Midline;Posterior Back Miles Salmon) -- 125 125 -- -- --    Blood  --  50 50  --  -- --    Est. Blood Loss -- 50 50 -- -- --    Total Output -- 1620 1620 -- -- --       Net I/O     -- -570 -570 -- -- --            Intake/Output Summary (Last 24 hours) at 2022 0816  Last data filed at 2022 0507  Gross per 24 hour   Intake 1050 ml   Output 1620 ml   Net -570 ml            • Pharmacy Consult Request  1 Each PHARMACY TO DOSE   • MD ALERT...DO NOT ADMINISTER NSAIDS or ASPIRIN unless ORDERED By Neurosurgery  1 Each PRN   • docusate  sodium  100 mg BID   • senna-docusate  1 Tablet Nightly   • senna-docusate  1 Tablet Q24HRS PRN   • polyethylene glycol/lytes  1 Packet BID PRN   • magnesium hydroxide  30 mL QDAY PRN   • bisacodyl  10 mg Q24HRS PRN   • sodium phosphate  1 Each Once PRN   • 0.9 % NaCl with KCl 20 mEq 1,000 mL   Continuous   • morphine   Continuous   • ceFAZolin  2 g Q8HR   • diphenhydrAMINE  25 mg Q6HRS PRN    Or   • diphenhydrAMINE  25 mg Q6HRS PRN   • ondansetron  4 mg Q4HRS PRN   • ondansetron  4 mg Q4HRS PRN   • tizanidine  2 mg TID PRN   • labetalol  10 mg Q HOUR PRN   • benzocaine-menthol  1 Lozenge Q2HRS PRN   • vitamin D3  5,000 Units DAILY   • On-Q Pump - Ropivacaine 270 mL (fixed-flow rate, dual-lumen)   Continuous       Assessment and Plan:  Hospital day #2  POD #1  Prophylactic anticoagulation: no            Plan:  1. Allow to sit up  2. Mobilize with PT/OT  3. D/C [place]  4. If he develops positional h/a then he needs to lay flat  5. D/C home today at 1600hrs if he clears pt, voiding and no h/a

## 2022-01-06 NOTE — PROGRESS NOTES
4 Eyes Skin Assessment Completed by PALAK Ortega and PALAK Dupree.    Head WDL  Ears WDL  Nose WDL  Mouth WDL  Neck WDL  Breast/Chest WDL  Shoulder Blades WDL  Spine Surgical dressing to lower back  (R) Arm/Elbow/Hand WDL  (L) Arm/Elbow/Hand WDL  Abdomen WDL  Groin WDL  Scrotum/Coccyx/Buttocks WDL  (R) Leg WDL  (L) Leg WDL  (R) Heel/Foot/Toe WDL  (L) Heel/Foot/Toe WDL          Devices In Places Blood Pressure Cuff, Pulse Ox, [place] and SCD's      Interventions In Place Pillows and Pressure Redistribution Mattress    Possible Skin Injury No    Pictures Uploaded Into Epic N/A  Wound Consult Placed N/A  RN Wound Prevention Protocol Ordered No

## 2022-01-06 NOTE — PROGRESS NOTES
ELIANA drain removed per order. New dressing applied to surgical incision.     (0) No drift; leg holds 30 degree position for full 5 secs

## 2022-01-06 NOTE — DISCHARGE SUMMARY
Discharge Summary    CHIEF COMPLAINT ON ADMISSION  No chief complaint on file.      Reason for Admission  Neurogenic claudication due to lumbar spinal stenosis.  Status post L3-L5 lumbar laminectomy with right L3-L4 and right L4-L5 discectomies  Intraoperative repair of incidental spinal fluid leak    Admission Date  1/5/2022    CODE STATUS  Full Code    HPI & HOSPITAL COURSE  This is a 71 y.o. male here with lumbar spinal stenosis and lower extremity radiculopathies.  He had MRI and x-ray imaging which showed severe stenosis at L3-L4 and L4-L5 with acute right disc herniation at L3-L4 and L4-L5.  He underwent multiple conservative therapies with ongoing symptoms.  Due to the persisting symptoms despite these conservative therapies he was offered the above-mentioned surgery and he did consent.    He underwent the above operation.  There was a slight intraoperative dural tear with repair.  He was kept bedrest flat overnight.  His drain was discontinued after 6 hours of use.  He slowly began to sit up and mobilize.  He denied positional headache.  He worked with therapies.  On postoperative day #1 he was eating, drinking, mobilizing. He did have urinary retention and was straight cathed. On POD #2 he was voiding on his own. He was hemodynamically stable. He denied positional headache. At that time it was determined he met criteria for discharge and was discharged to home in stable condition.  No notes on file    Therefore, he is discharged in good and stable condition to home with close outpatient follow-up.    The patient recovered much more quickly than anticipated on admission.    Discharge Date  1/6/2022    FOLLOW UP ITEMS POST DISCHARGE  Follow-up with Dr. Sutton in 2 and 6 weeks  If he develops a clear positional headache he needs to lay flat and call our office  If he has significant clear drainage from his wound he needs to let us know immediately  Nothing more strenuous than walking tomorrow  He may shower in  2 days.  He is not to submerge the wound  No NSAIDs for 2 weeks   He is to contact our office with any other questions or concerns.    DISCHARGE DIAGNOSES  Principal Problem:    Neurogenic claudication due to lumbar spinal stenosis POA: Unknown      Overview: Added automatically from request for surgery [id number]  Active Problems:    Spinal stenosis of lumbar region with radiculopathy POA: Yes  Resolved Problems:    * No resolved hospital problems. *      FOLLOW UP  Future Appointments   Date Time Provider Department Center   1/19/2022  4:00 PM ESTELLA [facility] None   2/24/2022 10:00 AM ESTELLA Cannon None     No follow-up provider specified.    MEDICATIONS ON DISCHARGE     Medication List      START taking these medications      Instructions   cephALEXin 500 MG Caps  Commonly known as: KEFLEX   Take 1 Capsule by mouth 4 times a day.  Dose: 500 mg     oxyCODONE-acetaminophen 5-325 MG Tabs  Commonly known as: PERCOCET   Take 1 Tablet by mouth every four hours as needed for up to 7 days.  Dose: 1 Tablet     tizanidine 2 MG tablet  Commonly known as: ZANAFLEX   Take 1-2 Tablets by mouth 3 times a day as needed.  Dose: 2-4 mg        CONTINUE taking these medications      Instructions   FIBER PO   Take 1 Tablet by mouth every day.  Dose: 1 Tablet        STOP taking these medications    traMADol 50 MG Tabs  Commonly known as: ULTRAM            Allergies  No Known Allergies    DIET  No orders of the defined types were placed in this encounter.      ACTIVITY  As tolerated.  Weight bearing as tolerated    CONSULTATIONS  none    PROCEDURES  L3-L4, L4-L5 lumbar laminectomy with bilateral foraminotomies  Right L3-L4 and right L4-L5 microdiscectomy  Intraoperative repair of incidental spinal fluid leak    LABORATORY  Lab Results   Component Value Date    SODIUM 138 01/06/2022    POTASSIUM 4.2 01/06/2022    CHLORIDE 105 01/06/2022    CO2 23 01/06/2022    GLUCOSE 110 (H) 01/06/2022    BUN 13 01/06/2022     CREATININE 0.84 01/06/2022        Lab Results   Component Value Date    WBC 8.2 01/06/2022    HEMOGLOBIN 14.3 01/06/2022    HEMATOCRIT 42.2 01/06/2022    PLATELETCT 209 01/06/2022        Total time of the discharge process exceeds 35 minutes.

## 2022-01-06 NOTE — PROGRESS NOTES
Patient reviewed in recovery.  No leg pain.  Full strength in the legs.  No headache.  He looks good.    Will leave him on bedrest.  He needs to lie flat but can go on the side but no head.  We will get his drain out at 2 AM.  We will try to get him home tomorrow afternoon.

## 2022-01-06 NOTE — PROGRESS NOTES
Patient arrived to unit from PACU on [facility]. Patient is AOx4, reports 3/10 pain to lower back. Will medicated patient per MAR. Surgical dressing to lower back CDI, ELIANA drain, On Q pump and aguilar in place. Patient updated on plan of care, all needs met at this time. Bed locked and in lowest position. Call light and personal belongings within reach.

## 2022-01-06 NOTE — ANESTHESIA TIME REPORT
Anesthesia Start and Stop Event Times     Date Time Event    1/5/2022 1736 Ready for Procedure     1743 Anesthesia Start     1926 Anesthesia Stop        Responsible Staff  01/05/22    Name Role Begin End    Eliot Shultz M.D. Anesth 1743 1926        Preop Diagnosis (Free Text):  Pre-op Diagnosis     Neurogenic claudication due to lumbar spinal stenosis [M48.062]        Preop Diagnosis (Codes):  Diagnosis Information     Diagnosis Code(s): Neurogenic claudication due to lumbar spinal stenosis [M48.062]        Premium Reason  A. 3PM - 7AM    Comments:

## 2022-01-06 NOTE — THERAPY
Missed Therapy     Patient Name: Lico Caba  Age:  71 y.o., Sex:  male  Medical Record #: [id number]  Today's Date: 1/6/2022 01/06/22 0755   Interdisciplinary Plan of Care Collaboration   Collaboration Comments PT eval order received. Active bedrest orders. Will eval once these are discharged and he is appropriate for out of bed tasks.

## 2022-01-06 NOTE — PROGRESS NOTES
No change to my last H and P (that is labelled a progress note). The note was made by either myself, or my PAs Rick Monterroso or Lul West but is signed by me.If it was done by my physician assistant, I verify it is correct and has my co-signature.    Allyson Sutton MD PhD TANYA

## 2022-01-06 NOTE — CARE PLAN
The patient is Watcher - Medium risk of patient condition declining or worsening    Shift Goals  Clinical Goals: pain control   Patient Goals: pain control and rest    Progress made toward(s) clinical / shift goals:      Problem: Pain - Standard  Goal: Alleviation of pain or a reduction in pain to the patient’s comfort goal  Outcome: Progressing     Problem: Knowledge Deficit - Standard  Goal: Patient and family/care givers will demonstrate understanding of plan of care, disease process/condition, diagnostic tests and medications  Outcome: Progressing       Patient is not progressing towards the following goals: N/A

## 2022-01-07 VITALS
SYSTOLIC BLOOD PRESSURE: 147 MMHG | HEART RATE: 76 BPM | OXYGEN SATURATION: 93 % | DIASTOLIC BLOOD PRESSURE: 71 MMHG | WEIGHT: 186.73 LBS | TEMPERATURE: 96.7 F | RESPIRATION RATE: 18 BRPM | HEIGHT: 72 IN | BODY MASS INDEX: 25.29 KG/M2

## 2022-01-07 LAB
ANION GAP SERPL CALC-SCNC: 11 MMOL/L (ref 7–16)
BUN SERPL-MCNC: 11 MG/DL (ref 8–22)
CALCIUM SERPL-MCNC: 8.5 MG/DL (ref 8.5–10.5)
CHLORIDE SERPL-SCNC: 102 MMOL/L (ref 96–112)
CO2 SERPL-SCNC: 23 MMOL/L (ref 20–33)
CREAT SERPL-MCNC: 0.71 MG/DL (ref 0.5–1.4)
ERYTHROCYTE [DISTWIDTH] IN BLOOD BY AUTOMATED COUNT: 42.9 FL (ref 35.9–50)
GLUCOSE SERPL-MCNC: 122 MG/DL (ref 65–99)
HCT VFR BLD AUTO: 42.6 % (ref 42–52)
HGB BLD-MCNC: 14.8 G/DL (ref 14–18)
MCH RBC QN AUTO: 32.2 PG (ref 27–33)
MCHC RBC AUTO-ENTMCNC: 34.7 G/DL (ref 33.7–35.3)
MCV RBC AUTO: 92.8 FL (ref 81.4–97.8)
PLATELET # BLD AUTO: 216 K/UL (ref 164–446)
PMV BLD AUTO: 9.8 FL (ref 9–12.9)
POTASSIUM SERPL-SCNC: 3.9 MMOL/L (ref 3.6–5.5)
RBC # BLD AUTO: 4.59 M/UL (ref 4.7–6.1)
SODIUM SERPL-SCNC: 136 MMOL/L (ref 135–145)
WBC # BLD AUTO: 11.5 K/UL (ref 4.8–10.8)

## 2022-01-07 PROCEDURE — A9270 NON-COVERED ITEM OR SERVICE: HCPCS | Performed by: PHYSICIAN ASSISTANT

## 2022-01-07 PROCEDURE — 96366 THER/PROPH/DIAG IV INF ADDON: CPT

## 2022-01-07 PROCEDURE — 700102 HCHG RX REV CODE 250 W/ 637 OVERRIDE(OP): Performed by: PHYSICIAN ASSISTANT

## 2022-01-07 PROCEDURE — 306591 TRAY SUTURE REMOVAL DISP: Performed by: NEUROLOGICAL SURGERY

## 2022-01-07 PROCEDURE — G0378 HOSPITAL OBSERVATION PER HR: HCPCS

## 2022-01-07 PROCEDURE — 85027 COMPLETE CBC AUTOMATED: CPT

## 2022-01-07 PROCEDURE — 99024 POSTOP FOLLOW-UP VISIT: CPT | Performed by: PHYSICIAN ASSISTANT

## 2022-01-07 PROCEDURE — 80048 BASIC METABOLIC PNL TOTAL CA: CPT

## 2022-01-07 RX ADMIN — OXYCODONE HYDROCHLORIDE AND ACETAMINOPHEN 2 TABLET: 5; 325 TABLET ORAL at 02:20

## 2022-01-07 RX ADMIN — DOCUSATE SODIUM 100 MG: 100 CAPSULE ORAL at 04:29

## 2022-01-07 RX ADMIN — CHOLECALCIFEROL TAB 125 MCG (5000 UNIT) 5000 UNITS: 125 TAB at 04:29

## 2022-01-07 ASSESSMENT — PAIN DESCRIPTION - PAIN TYPE: TYPE: SURGICAL PAIN

## 2022-01-07 NOTE — DISCHARGE INSTRUCTIONS
Lumbar Laminectomy, Care After  Refer to this sheet in the next few weeks. These instructions provide you with information on caring for yourself after your procedure. Your health care provider may also give you more specific instructions. Your treatment has been planned according to current medical practices, but problems sometimes occur. Call your health care provider if you have any problems or questions after your procedure.  HOME CARE INSTRUCTIONS   · Check the incision twice a day for signs of infection. Some signs may include a foul-smelling, greenish or yellowish discharge from the wound, increased pain, or increased redness over the incision.  · Change your bandages about 24-36 hours after surgery, or as directed.  · You may shower once the bandage is removed, or as directed. Avoid tub baths, swimming, and hot tubs for 3 weeks or until your incision has healed completely. If you have stitches or staples, they may be removed 2-3 weeks after surgery, or as directed by your doctor.  · Daily exercise is helpful to prevent the return of problems. Walking is permitted. You may use a treadmill without an incline. Cut down on activities and exercise if you have discomfort. You may also go up and down stairs as much as you can tolerate.  · Do not lift anything heavier than 15 lb. Avoid bending or twisting at the waist. Always bend your knees.  · Maintain strength and range of motion as instructed.  · Do not drive for 2-3 weeks, or as directed by your doctor. You may be a passenger for 20-30 minutes at a time. Lying back in the passenger seat may be more comfortable for you.  · Limit your sitting to intervals of 20-30 minutes. You should lie down or walk in between sitting periods. There are no limitations for sitting in a recliner chair.  · Only take over-the-counter or prescription medicines for pain, discomfort, or fever as directed by your health care provider.  SEEK MEDICAL CARE IF:   · There is increased  bleeding (more than a small spot) from the wound.  · You notice redness, swelling, or increasing pain in the wound.  · Pus is coming from the wound.  · You have a fever for more than 2-3 days.  · You notice a foul smell coming from the wound or dressing.  · You have increasing pain in your wound.  SEEK IMMEDIATE MEDICAL CARE IF:   · You develop a rash.  · You have difficulty breathing.  · You have any allergic problems.  · You develop a headache or stiff neck that does not respond to pain relievers.  · You are unable to urinate.  · You develop new onset of pain, numbness, or weakness in the buttocks or lower extremities.  MAKE SURE YOU:   · Understand these instructions.  · Will watch your condition.  · Will get help right away if you are not doing well or get worse.     This information is not intended to replace advice given to you by your health care provider. Make sure you discuss any questions you have with your health care provider.     Document Released: 11/21/2005 Document Revised: 08/20/2014 Document Reviewed: 05/15/2014  [employer] Patient Education ©2016 [employer]  Discharge Instructions    Discharged to home by car with relative. Discharged via walking, hospital escort: Yes.  Special equipment needed: Not Applicable    Be sure to schedule a follow-up appointment with your primary care doctor or any specialists as instructed.     Discharge Plan:   Diet Plan: Discussed  Activity Level: Discussed  Confirmed Follow up Appointment: Patient to Call and Schedule Appointment  Confirmed Symptoms Management: Discussed  Medication Reconciliation Updated: Yes  Influenza Vaccine Indication: Patient Refuses    I understand that a diet low in cholesterol, fat, and sodium is recommended for good health. Unless I have been given specific instructions below for another diet, I accept this instruction as my diet prescription.   Other diet: regular    Special Instructions: None    · Is patient discharged on  Warfarin / Coumadin?   No     Depression / Suicide Risk    As you are discharged from this [facility] facility, it is important to learn how to keep safe from harming yourself.    Recognize the warning signs:  · Abrupt changes in personality, positive or negative- including increase in energy   · Giving away possessions  · Change in eating patterns- significant weight changes-  positive or negative  · Change in sleeping patterns- unable to sleep or sleeping all the time   · Unwillingness or inability to communicate  · Depression  · Unusual sadness, discouragement and loneliness  · Talk of wanting to die  · Neglect of personal appearance   · Rebelliousness- reckless behavior  · Withdrawal from people/activities they love  · Confusion- inability to concentrate     If you or a loved one observes any of these behaviors or has concerns about self-harm, here's what you can do:  · Talk about it- your feelings and reasons for harming yourself  · Remove any means that you might use to hurt yourself (examples: pills, rope, extension cords, firearm)  · Get professional help from the community (Mental Health, Substance Abuse, psychological counseling)  · Do not be alone:Call your Safe Contact- someone whom you trust who will be there for you.  · Call your local CRISIS HOTLINE 419-3340 or 413-623-3696  · Call your local Children's Mobile Crisis Response Team [place] (505) 455-0314 or www.[employer]  · Call the toll free National Suicide Prevention Hotlines   · National Suicide Prevention Lifeline 683-820-XQCT (1090)  · National Hope Line Network 800-SUICIDE (491-2929)

## 2022-01-07 NOTE — PROGRESS NOTES
Neurosurgery Progress Note    Subjective:  POD #2  Urinary retention yesterday  -voiding on own overnight  Sitting up in bed.  Denies h/a  Drain removed last night per orders  Denies h/a  Some surgical site pain  Cleared therapies yesterday    Exam:  Wound: C/d/I, no drainage  Labs stable  VSS  LE motor 5/5 throughout bilaterally    BP  Min: 134/67  Max: 158/81  Pulse  Av  Min: 75  Max: 106  Resp  Av  Min: 14  Max: 18  Temp  Av.7 °C (98.1 °F)  Min: 36.4 °C (97.5 °F)  Max: 37.1 °C (98.7 °F)  SpO2  Av.5 %  Min: 88 %  Max: 95 %    No data recorded    Recent Labs     22  0502 22  0427   WBC 8.2 11.5*   RBC 4.51* 4.59*   HEMOGLOBIN 14.3 14.8   HEMATOCRIT 42.2 42.6   MCV 93.6 92.8   MCH 31.7 32.2   MCHC 33.9 34.7   RDW 44.2 42.9   PLATELETCT 209 216   MPV 9.7 9.8     Recent Labs     22  0502 22  0427   SODIUM 138 136   POTASSIUM 4.2 3.9   CHLORIDE 105 102   CO2 23 23   GLUCOSE 110* 122*   BUN 13 11   CREATININE 0.84 0.71   CALCIUM 7.9* 8.5               Intake/Output                       22 07 - 22 0659 22 07 - 22 0659     1900-0659 Total 1900-0659 Total                 Intake    P.O.  350  -- 350  --  -- --    P.O. 350 -- 350 -- -- --    Total Intake 350 -- 350 -- -- --       Output    Urine  720  3400 4120  --  -- --    Straight Catheter  -- -- --    Urine Void (mL) 380 2600 2980 -- -- --    Total Output 720 3400 4120 -- -- --       Net I/O     -370 -3400 -3770 -- -- --            Intake/Output Summary (Last 24 hours) at 2022 0730  Last data filed at 2022 0220  Gross per 24 hour   Intake 350 ml   Output 4120 ml   Net -3770 ml       $ Bladder Scan Results (mL): 92    • oxyCODONE-acetaminophen  1-2 Tablet Q4HRS PRN   • tamsulosin  0.4 mg AFTER BREAKFAST   • Pharmacy Consult Request  1 Each PHARMACY TO DOSE   • MD ALERT...DO NOT ADMINISTER NSAIDS or ASPIRIN unless ORDERED By Neurosurgery  1 Each PRN   • docusate  sodium  100 mg BID   • senna-docusate  1 Tablet Nightly   • senna-docusate  1 Tablet Q24HRS PRN   • polyethylene glycol/lytes  1 Packet BID PRN   • magnesium hydroxide  30 mL QDAY PRN   • bisacodyl  10 mg Q24HRS PRN   • sodium phosphate  1 Each Once PRN   • 0.9 % NaCl with KCl 20 mEq 1,000 mL   Continuous   • diphenhydrAMINE  25 mg Q6HRS PRN    Or   • diphenhydrAMINE  25 mg Q6HRS PRN   • ondansetron  4 mg Q4HRS PRN   • ondansetron  4 mg Q4HRS PRN   • tizanidine  2 mg TID PRN   • labetalol  10 mg Q HOUR PRN   • benzocaine-menthol  1 Lozenge Q2HRS PRN   • vitamin D3  5,000 Units DAILY   • On-Q Pump - Ropivacaine 270 mL (fixed-flow rate, dual-lumen)   Continuous       Assessment and Plan:  Hospital day #3  POD #2  Prophylactic anticoagulation: no            Plan:  1. D/C OnQ this am  2. D/C home this am

## 2022-01-07 NOTE — PROGRESS NOTES
Pt in bed awake,a&ox4,no c/o pain,voiding good,no c/o difficulty to urinate,ambulated,wife at bedside,pt for discharge.

## 2022-01-07 NOTE — CARE PLAN
The patient is Stable - Low risk of patient condition declining or worsening    Shift Goals  Clinical Goals: urinate without difficulty  Patient Goals: pain control, DC  Family Goals: DC    Progress made toward(s) clinical / shift goals:    Problem: Knowledge Deficit - Standard  Goal: Patient and family/care givers will demonstrate understanding of plan of care, disease process/condition, diagnostic tests and medications  Outcome: Progressing     Problem: Fall Risk  Goal: Patient will remain free from falls  Outcome: Progressing     Patient is not progressing towards the following goals:

## 2022-01-07 NOTE — PROGRESS NOTES
Assumed care of patient. Patient is AOx4 and reported 10/10 pain to lower abdomen. Patient explained that they have been able to void in small amounts, but with a lot of difficulty. Patient bladder scanned and obtained a result of 950 ml. Patient was straight cathed and 800 ml of urine was obtained. Post straight cath bladder scan result showed 92 ml. Patient reporting relief post straight cath. This RN explained to patient that we would continue to frequently monitor his urine output. Patient verbalized understanding, all needs met at this time. Bed locked and in lowest position. Call light and personal belongings within reach.

## 2022-01-07 NOTE — PROGRESS NOTES
Pt a&ox4,no c/o pain,pt not in distress,discharge instructions &prescriptions given,pt able to follow instructions,questions answered,piv dc'd,intact,ON Q dc'd,site intact,dressing changed,discharged  without any events.

## 2022-07-08 NOTE — OR NURSING
1927 Pt from OR, asleep, with O2 support of 6 L/min via mask, respirations regular and spontaneous, vital signs within normal limits.  Noted ELIANA drain with small serous output, and ON-Q pump going  SCDs ON, bed set to lowest position and locked.    1936 Pt awake, denies any pain and nausea. Neuro v/s done - within normal limits, denies pain at right leg.    1940 Dr. Cuba at bedside, Neuro v/s done, within normal limits.    1945 Back dressings CDI, ELIANA drain intact.    2015 Recovery complete, awaiting room assignment.    2030 Pt comfortably resting in bed, denies pain and nausea.    2140 Pt called wife (Lilia) thru telephone call.    2146 Report given to Jordan CID.    2239 Pt A&Ox4, VSS, pain is in tolerable level. To room with transport and with O2 support of 1 L/min via nasal cannula (O2 level 383). 2 bags of belongings at the bed, pt is aware.       [Joint Pain] : joint pain [Negative] : Heme/Lymph

## 2022-11-10 ENCOUNTER — PATIENT MESSAGE (OUTPATIENT)
Dept: HEALTH INFORMATION MANAGEMENT | Facility: OTHER | Age: 72
End: 2022-11-10

## 2022-11-17 NOTE — THERAPY
Addended by: Yudi Albarado on: 11/17/2022 04:12 PM     Modules accepted: Orders Chief Complaint   Patient presents with   • Annual Exam     patient states he is here for an annual no concerns         HPI:  Magnus Claudio is a 72 y.o. here for Medicare Annual Wellness Visit     Healthcare maintenance  Lipids: Feb 2017 , , HDL 30, LDL 69.   10 year ACC risk: 40.6%.   Fasting Glucose: has DM.   Colonoscopy: done Feb 2017 with 3 year recall.     Flu vaccine: recommended, patient will get at pharmacy.  Pneumonia vaccine: patient states he has had 2 series of pneumonia vaccines.   Tdap:  recommended, patient will get at pharmacy.  Shingrix Vaccine:  recommended, patient will get at pharmacy.    Essential hypertension  Patient's blood pressure is at goal on his current medication regimen.    Controlled type 2 diabetes mellitus without complication, without long-term current use of insulin (CMS-HCC)  Hypoglycemic therapy: Metformin ER 1000 mg twice daily, glimepiride 8 mg daily. Patient states he could not afford the Januvia.  Last A1c: 9/21/18 was 7.9.   10 year ACC risk: 40.6%.  Aspirin: Taking 81 mg daily.   Statin: Patient declines.  ARB: Taking valsartan.  Urine Microalbumin:  2/8/2017 was normal.  Monofilament Exam:  Normal 10/10/17.   Retinal Screening: done 2017. Has appt upcoming.  Pneumonia vaccine:  UTD per patient.    Thrombocytopenia ([facility])  The patient's platelets are stable.      Patient Active Problem List    Diagnosis Date Noted   • Thrombocytopenia (HCC) 05/22/2018   • Healthcare maintenance 10/10/2017   • Controlled type 2 diabetes mellitus without complication, without long-term current use of insulin ([facility]) 02/06/2017   • Decreased hearing of both ears 04/26/2016   • History of colon cancer, stage IV 10/08/2015   • Essential hypertension 10/08/2015       Current Outpatient Prescriptions   Medication Sig Dispense Refill   • sitagliptan-metformin (JANUMET)  MG per tablet Take 1 Tab by mouth 2 times a day, with meals. 180 Tab 4   • Blood Glucose Monitoring Suppl  Physical Therapy   Initial Evaluation     Patient Name: Lico Caba  Age:  71 y.o., Sex:  male  Medical Record #: [id number]  Today's Date: 1/6/2022     Precautions  Precautions: Spinal / Back Precautions   Comments: no brace per order    Assessment  Mr. Caba is a 72 y/o male who presents to acute secondary to L3-5 laminectomies. Provided pt and spouse with spinal precaution handout and reviewed, both demonstrated understanding. Sequenced through log roll technique, able to perform task. LE strength equal bilaterally and WFL. Gait training with FWW completed, pt able to ambulate in hallway without assist. Discussed activity recommendations with patient and spouse, both demonstrated understanding. No additional acute PT needs. Recommend follow up with outpatient PT once cleared by MD.      Plan    Recommend Physical Therapy for Evaluation only    DC Equipment Recommendations: None  Discharge Recommendations: Recommend outpatient physical therapy services to address higher level deficits            Objective       01/06/22 1027   Prior Living Situation   Prior Services None   Housing / Facility 1 Story House   Steps Into Home 0   Equipment Owned Front-Wheel Walker   Lives with - Patient's Self Care Capacity Spouse   Comments spouse present and very supportive   Prior Level of Functional Mobility   Bed Mobility Independent   Transfer Status Independent   Ambulation Independent   Distance Ambulation (Feet)   (community ambulator)   Assistive Devices Used None   Cognition    Level of Consciousness Alert   Comments very pleasant and motivated   Passive ROM Lower Body   Passive ROM Lower Body WDL   Active ROM Lower Body    Active ROM Lower Body  WDL   Strength Lower Body   Lower Body Strength  WDL   Sensation Lower Body   Lower Extremity Sensation   WDL   Balance Assessment   Sitting Balance (Static) Fair +   Sitting Balance (Dynamic) Fair +   Standing Balance (Static) Fair +   Standing Balance (Dynamic)  Fair   Weight Shift Sitting Good   Weight Shift Standing Fair   Comments FWW   Gait Analysis   Gait Level Of Assist Supervised   Assistive Device Front Wheel Walker   Distance (Feet) 145   # of Times Distance was Traveled 1   Deviation Bradykinetic;Shuffled Gait   Weight Bearing Status no restrictions   Bed Mobility    Supine to Sit Supervised   Sit to Supine Supervised   Scooting Supervised   Functional Mobility   Sit to Stand Supervised          Supplies Misc Test strips for One Touch or glucometer covered by patient's insurance. Sig: use once daily to check blood sugars. 100 Strip 12   • Blood Glucose Monitoring Suppl Device Dispense One Touch or glucometer covered by patient's insurance. Sig. Use once daily for blood sugar monitoring. #1. NR. 1 Device 0   • MICROLET LANCETS Misc Use daily to check blood sugars. 100 Each 12   • Lancets Misc Sig: use once daily and prn ssx high or low sugar. 300 Each 12   • amLODIPine (NORVASC) 5 MG Tab Take 1 Tab by mouth every day. 90 Tab 3   • glimepiride (AMARYL) 4 MG Tab TAKE 2 TABLETS BY MOUTH ONC DAILY WITH BREAKFAST OR THE FIRST MEAL OF THE  Tab 3   • valsartan-hydrochlorothiazide (DIOVAN-HCT) 320-12.5 MG per tablet TAKE 1 TABLET BY MOUTH EVERY DAY 90 Tab 3   • therapeutic multivitamin-minerals (THERAGRAN-M) Tab Take 1 Tab by mouth every day.     • aspirin (CLAUDINE ASPIRIN EC LOW DOSE) 81 MG EC tablet Take  by mouth.       No current facility-administered medications for this visit.             Current supplements as per medication list.       Allergies: Morphine    Current social contact/activities: woodworking.      He  reports that he has never smoked. He has never used smokeless tobacco. He reports that he does not drink alcohol or use drugs.  Counseling given: Not Answered        DPA/Advanced Directive:  Patient does not have an Advanced Directive.  A packet and workshop information was given on Advanced Directives.      ROS:    Gait: Uses no assistive device   Ostomy: no   Other tubes: no   Amputations: no   Chronic oxygen use: no   Last eye exam: coming up this week.   : Denies incontinence.       Screening:    Depression Screening    Little interest or pleasure in doing things?  0 - not at all  Feeling down, depressed , or hopeless? 0 - not at all  Patient Health Questionnaire Score: 0     If depressive symptoms identified deferred to follow up visit unless specifically addressed in assessment and  plan.    Interpretation of PHQ-9 Total Score   Score Severity   1-4 No Depression   5-9 Mild Depression   10-14 Moderate Depression   15-19 Moderately Severe Depression   20-27 Severe Depression    Screening for Cognitive Impairment    Three Minute Recall (leader, season, table) 3/3    Darren clock face with all 12 numbers and set the hands to show 10 past 11.  Yes 5/5  Cognitive concerns identified deferred for follow up unless specifically addressed in assessment and plan.    Fall Risk Assessment    Has the patient had two or more falls in the last year or any fall with injury in the last year?  No    Safety Assessment    Throw rugs on floor.  No  Handrails on all stairs.  Yes  Good lighting in all hallways.  Yes  Difficulty hearing.  Yes  Patient counseled about all safety risks that were identified.    Functional Assessment ADLs    Are there any barriers preventing you from cooking for yourself or meeting nutritional needs?  No.    Are there any barriers preventing you from driving safely or obtaining transportation?  No.    Are there any barriers preventing you from using a telephone or calling for help?  No.    Are there any barriers preventing you from shopping?  No.    Are there any barriers preventing you from taking care of your own finances?  No.    Are there any barriers preventing you from managing your medications?  No.    Are there any barriers preventing you from showering, bathing or dressing yourself?  No.    Are you currently engaging in any exercise or physical activity?  Yes.     What is your perception of your health?  Good.      Health Maintenance Summary                IMM HEP B VACCINE Overdue 6/15/1965     IMM DTaP/Tdap/Td Vaccine Overdue 6/15/1963     IMM ZOSTER VACCINES Overdue 6/15/1994     Annual Wellness Visit Overdue 5/18/2017      Done 5/17/2016      Patient has more history with this topic...    IMM INFLUENZA Overdue 9/1/2018      Done 11/14/2017 Imm Admin: Influenza Vaccine Adult HD      Patient has more history with this topic...    RETINAL SCREENING Overdue 9/25/2018      Done 9/25/2017 REFERRAL FOR RETINAL SCREENING EXAM    DIABETES MONOFILAMENT / LE EXAM Next Due 10/10/2018      Done 10/10/2017 AMB DIABETIC MONOFILAMENT LOWER EXTREMITY EXAM     Patient has more history with this topic...    IMM PNEUMOCOCCAL 65+ (ADULT) LOW/MEDIUM RISK SERIES Next Due 11/21/2018      Done 11/21/2017 Imm Admin: Pneumococcal Conjugate Vaccine (Prevnar/PCV-13)    FASTING LIPID PROFILE Next Due 2/26/2019      Done 2/26/2018 LIPID PROFILE      Patient has more history with this topic...    URINE ACR / MICROALBUMIN Next Due 2/26/2019      Done 2/26/2018 MICROALBUMIN CREAT RATIO URINE     Patient has more history with this topic...    A1C SCREENING Next Due 3/21/2019      Done 9/21/2018 HEMOGLOBIN A1C      Patient has more history with this topic...    SERUM CREATININE Next Due 9/21/2019      Done 9/21/2018 COMP METABOLIC PANEL      Patient has more history with this topic...    COLONOSCOPY Next Due 2/16/2020      Done 2/16/2017 REFERRAL TO GI FOR COLONOSCOPY          Patient Care Team:  Too Brito M.D. as PCP - General (Family Medicine)  Baljinder Palacios M.D. as [facility] (Oncology)  Ester Doe M.D. as Consulting Physician (Ophthalmology)        Social History   Substance Use Topics   • Smoking status: Never Smoker   • Smokeless tobacco: Never Used   • Alcohol use No     Family History   Problem Relation Age of Onset   • Hypertension Father    • Heart Attack Father    • Cancer Mother         Breast   • Diabetes Sister    • Hyperlipidemia Neg Hx         unknown     He  has a past medical history of Cancer (HCC); Diabetes (HCC); Hypertension; and Vertigo, benign positional.   Past Surgical History:   Procedure Laterality Date   • COLON RESECTION     • LAMINOTOMY     • PB RESEC LIVER,PART LOBECTOMY         Exam:   Blood pressure 132/88, pulse 82, temperature 36.1 °C (97 °F), temperature  "source Temporal, resp. rate 20, height 1.803 m (5' 11\"), weight 88 kg (194 lb 0.1 oz), SpO2 95 %. Body mass index is 27.06 kg/m².    Hearing good.    Dentition good  Alert, oriented in no acute distress.  Eye contact is good, speech goal directed, affect calm    Cardio: RRR no m/r/g.   Resp: CTAB no w/r/r.       Assessment and Plan. The following treatment and monitoring plan is recommended:      1. Healthcare maintenance  - see HPI.     2. Controlled type 2 diabetes mellitus without complication, without long-term current use of insulin (HCC)  - continue glimepiride.   - sitagliptan-metformin (JANUMET)  MG per tablet; Take 1 Tab by mouth 2 times a day, with meals.  Dispense: 180 Tab; Refill: 4    3. Essential hypertension  - continue current regimen.     4. Thrombocytopenia (HCC)  - stable.     Services suggested: No services needed at this time  Health Care Screening: Age-appropriate preventive services Medicare covers discussed today and ordered if indicated.  Referrals offered: Community-based lifestyle interventions to reduce health risks and promote self-management and wellness, fall prevention, nutrition, physical activity, tobacco-use cessation, weight loss, and mental health services as per orders if indicated.    Discussion today about general wellness and lifestyle habits:    · Prevent falls and reduce trip hazards; Cautioned about securing or removing rugs.  · Have a working fire alarm and carbon monoxide detector;   · Engage in regular physical activity and social activities       Follow-up: Return in about 3 months (around 12/25/2018), or if symptoms worsen or fail to improve, for DM.  "

## 2025-03-21 ENCOUNTER — HOSPITAL ENCOUNTER (INPATIENT)
Facility: REHABILITATION | Age: 75
DRG: 057 | End: 2025-03-21
Attending: PHYSICAL MEDICINE & REHABILITATION | Admitting: PHYSICAL MEDICINE & REHABILITATION
Payer: COMMERCIAL

## 2025-03-21 DIAGNOSIS — I62.9 INTRACRANIAL HEMORRHAGE, SPONTANEOUS INTRAPARENCHYMAL, IDIOPATHIC, ACUTE (HCC): ICD-10-CM

## 2025-03-21 LAB — EKG IMPRESSION: NORMAL

## 2025-03-21 PROCEDURE — A9270 NON-COVERED ITEM OR SERVICE: HCPCS | Performed by: PHYSICAL MEDICINE & REHABILITATION

## 2025-03-21 PROCEDURE — 770010 HCHG ROOM/CARE - REHAB SEMI PRIVAT*

## 2025-03-21 PROCEDURE — 93005 ELECTROCARDIOGRAM TRACING: CPT | Mod: TC | Performed by: PHYSICAL MEDICINE & REHABILITATION

## 2025-03-21 PROCEDURE — 93010 ELECTROCARDIOGRAM REPORT: CPT | Performed by: INTERNAL MEDICINE

## 2025-03-21 PROCEDURE — 700102 HCHG RX REV CODE 250 W/ 637 OVERRIDE(OP): Performed by: PHYSICAL MEDICINE & REHABILITATION

## 2025-03-21 PROCEDURE — 99223 1ST HOSP IP/OBS HIGH 75: CPT | Performed by: PHYSICAL MEDICINE & REHABILITATION

## 2025-03-21 PROCEDURE — 94760 N-INVAS EAR/PLS OXIMETRY 1: CPT

## 2025-03-21 RX ORDER — ONDANSETRON 4 MG/1
4 TABLET, ORALLY DISINTEGRATING ORAL 4 TIMES DAILY PRN
Status: DISCONTINUED | OUTPATIENT
Start: 2025-03-21 | End: 2025-04-03 | Stop reason: HOSPADM

## 2025-03-21 RX ORDER — ONDANSETRON 2 MG/ML
4 INJECTION INTRAMUSCULAR; INTRAVENOUS 4 TIMES DAILY PRN
Status: DISCONTINUED | OUTPATIENT
Start: 2025-03-21 | End: 2025-04-03 | Stop reason: HOSPADM

## 2025-03-21 RX ORDER — AMOXICILLIN 250 MG
2 CAPSULE ORAL EVERY EVENING
Status: DISCONTINUED | OUTPATIENT
Start: 2025-03-21 | End: 2025-04-03 | Stop reason: HOSPADM

## 2025-03-21 RX ORDER — BUTALBITAL, ACETAMINOPHEN AND CAFFEINE 50; 325; 40 MG/1; MG/1; MG/1
1 TABLET ORAL EVERY 6 HOURS PRN
Status: DISCONTINUED | OUTPATIENT
Start: 2025-03-21 | End: 2025-04-03 | Stop reason: HOSPADM

## 2025-03-21 RX ORDER — OMEPRAZOLE 20 MG/1
20 CAPSULE, DELAYED RELEASE ORAL DAILY
Status: DISCONTINUED | OUTPATIENT
Start: 2025-03-21 | End: 2025-04-03 | Stop reason: HOSPADM

## 2025-03-21 RX ORDER — ACETAMINOPHEN 500 MG
1000 TABLET ORAL 3 TIMES DAILY
Status: DISCONTINUED | OUTPATIENT
Start: 2025-03-21 | End: 2025-03-26

## 2025-03-21 RX ORDER — HYDRALAZINE HYDROCHLORIDE 25 MG/1
25 TABLET, FILM COATED ORAL EVERY 8 HOURS PRN
Status: DISCONTINUED | OUTPATIENT
Start: 2025-03-21 | End: 2025-04-03 | Stop reason: HOSPADM

## 2025-03-21 RX ORDER — POLYETHYLENE GLYCOL 3350 17 G/17G
1 POWDER, FOR SOLUTION ORAL
Status: DISCONTINUED | OUTPATIENT
Start: 2025-03-21 | End: 2025-04-03 | Stop reason: HOSPADM

## 2025-03-21 RX ORDER — OXYCODONE HYDROCHLORIDE 5 MG/1
2.5 TABLET ORAL EVERY 4 HOURS PRN
Refills: 0 | Status: DISCONTINUED | OUTPATIENT
Start: 2025-03-21 | End: 2025-04-03 | Stop reason: HOSPADM

## 2025-03-21 RX ORDER — ECHINACEA PURPUREA EXTRACT 125 MG
2 TABLET ORAL PRN
Status: DISCONTINUED | OUTPATIENT
Start: 2025-03-21 | End: 2025-04-03 | Stop reason: HOSPADM

## 2025-03-21 RX ORDER — TRAZODONE HYDROCHLORIDE 50 MG/1
50 TABLET ORAL
Status: DISCONTINUED | OUTPATIENT
Start: 2025-03-21 | End: 2025-04-03 | Stop reason: HOSPADM

## 2025-03-21 RX ORDER — LEVETIRACETAM 500 MG/1
500 TABLET ORAL 2 TIMES DAILY
Status: DISCONTINUED | OUTPATIENT
Start: 2025-03-21 | End: 2025-04-03 | Stop reason: HOSPADM

## 2025-03-21 RX ADMIN — ACETAMINOPHEN 1000 MG: 500 TABLET ORAL at 20:41

## 2025-03-21 RX ADMIN — ACETAMINOPHEN 1000 MG: 500 TABLET ORAL at 15:46

## 2025-03-21 RX ADMIN — OMEPRAZOLE 20 MG: 20 CAPSULE, DELAYED RELEASE ORAL at 13:57

## 2025-03-21 RX ADMIN — LEVETIRACETAM 500 MG: 500 TABLET, FILM COATED ORAL at 20:42

## 2025-03-21 RX ADMIN — SENNOSIDES AND DOCUSATE SODIUM 2 TABLET: 50; 8.6 TABLET ORAL at 20:42

## 2025-03-21 ASSESSMENT — LIFESTYLE VARIABLES
DOES PATIENT WANT TO STOP DRINKING: NO
CONSUMPTION TOTAL: NEGATIVE
EVER FELT BAD OR GUILTY ABOUT YOUR DRINKING: NO
ON A TYPICAL DAY WHEN YOU DRINK ALCOHOL HOW MANY DRINKS DO YOU HAVE: 1
ALCOHOL_USE: YES
TOTAL SCORE: 0
TOTAL SCORE: 0
EVER HAD A DRINK FIRST THING IN THE MORNING TO STEADY YOUR NERVES TO GET RID OF A HANGOVER: NO
TOTAL SCORE: 0
HAVE YOU EVER FELT YOU SHOULD CUT DOWN ON YOUR DRINKING: NO
AVERAGE NUMBER OF DAYS PER WEEK YOU HAVE A DRINK CONTAINING ALCOHOL: 0
HAVE PEOPLE ANNOYED YOU BY CRITICIZING YOUR DRINKING: NO
HOW MANY TIMES IN THE PAST YEAR HAVE YOU HAD 5 OR MORE DRINKS IN A DAY: 0

## 2025-03-21 ASSESSMENT — PATIENT HEALTH QUESTIONNAIRE - PHQ9
1. LITTLE INTEREST OR PLEASURE IN DOING THINGS: NOT AT ALL
2. FEELING DOWN, DEPRESSED, IRRITABLE, OR HOPELESS: NOT AT ALL
SUM OF ALL RESPONSES TO PHQ9 QUESTIONS 1 AND 2: 0
2. FEELING DOWN, DEPRESSED, IRRITABLE, OR HOPELESS: NOT AT ALL
1. LITTLE INTEREST OR PLEASURE IN DOING THINGS: NOT AT ALL
SUM OF ALL RESPONSES TO PHQ9 QUESTIONS 1 AND 2: 0

## 2025-03-21 NOTE — FLOWSHEET NOTE
03/21/25 1200   Protocol Assessment   Initial Assessment Yes   Patient History   Pulmonary Diagnosis none   Procedures Relevant to Respiratory Status none   Home O2 No   Nocturnal CPAP No   Home Treatments/Frequency No     No respiratory hx/dx no meds o2 or cpap[

## 2025-03-21 NOTE — CARE PLAN
"The patient is Stable - Low risk of patient condition declining or worsening    Shift Goals  Clinical Goals: Comfort and safety  Patient Goals: Comfort    Progress made toward(s) clinical / shift goals:    Problem: Knowledge Deficit - Standard  Goal: Patient and family/care givers will demonstrate understanding of plan of care, disease process/condition, diagnostic tests and medications  Outcome: Progressing     Problem: Fall Risk - Rehab  Goal: Patient will remain free from falls  Outcome: Progressing  Note: [facility] Elizondo Fall risk Assessment Score: 12    High fall risk Interventions   - Bed and strip alarm   - Yellow sign by the door   - Yellow wrist band \"Fall risk\"  - Room near to the nurse station  - Do not leave patient unattended in the bathroom  - Fall risk education provided     Problem: Discharge Barriers/Planning  Goal: Patient's continuum of care needs are met  Outcome: Progressing     Problem: Risk for Aspiration  Goal: Patient's risk for aspiration will be absent or decrease  Outcome: Progressing     Problem: Bladder / Voiding  Goal: Patient will establish and maintain regular urinary output  Outcome: Progressing     Problem: Bowel Elimination  Goal: Patient will participate in bowel management program  Outcome: Progressing       Patient is not progressing towards the following goals:      "

## 2025-03-21 NOTE — FLOWSHEET NOTE
03/21/25 1200   Events/Summary/Plan   Events/Summary/Plan RT assesment   Vital Signs   Pulse 68   Respiration 16   Pulse Oximetry 95 %   $ Pulse Oximetry (Spot Check) Yes   Respiratory Assessment   Respiratory Pattern Within Normal Limits   Level of Consciousness Alert   Chest Exam   Work Of Breathing / Effort Within Normal Limits   Breath Sounds   RUL Breath Sounds Clear   RML Breath Sounds Clear   RLL Breath Sounds Clear   ZACH Breath Sounds Clear   LLL Breath Sounds Clear   Oxygen   O2 Delivery Device None - Room Air

## 2025-03-22 ENCOUNTER — APPOINTMENT (OUTPATIENT)
Dept: SPEECH THERAPY | Facility: REHABILITATION | Age: 75
DRG: 057 | End: 2025-03-22
Attending: PHYSICAL MEDICINE & REHABILITATION
Payer: COMMERCIAL

## 2025-03-22 ENCOUNTER — APPOINTMENT (OUTPATIENT)
Dept: OCCUPATIONAL THERAPY | Facility: REHABILITATION | Age: 75
DRG: 057 | End: 2025-03-22
Attending: PHYSICAL MEDICINE & REHABILITATION
Payer: COMMERCIAL

## 2025-03-22 ENCOUNTER — APPOINTMENT (OUTPATIENT)
Dept: PHYSICAL THERAPY | Facility: REHABILITATION | Age: 75
DRG: 057 | End: 2025-03-22
Attending: PHYSICAL MEDICINE & REHABILITATION
Payer: COMMERCIAL

## 2025-03-22 LAB
25(OH)D3 SERPL-MCNC: 32 NG/ML (ref 30–100)
ALBUMIN SERPL BCP-MCNC: 3.6 G/DL (ref 3.2–4.9)
ALBUMIN/GLOB SERPL: 1.4 G/DL
ALP SERPL-CCNC: 91 U/L (ref 30–99)
ALT SERPL-CCNC: 103 U/L (ref 2–50)
ANION GAP SERPL CALC-SCNC: 12 MMOL/L (ref 7–16)
AST SERPL-CCNC: 33 U/L (ref 12–45)
BASOPHILS # BLD AUTO: 0.2 % (ref 0–1.8)
BASOPHILS # BLD: 0.02 K/UL (ref 0–0.12)
BILIRUB SERPL-MCNC: 0.5 MG/DL (ref 0.1–1.5)
BUN SERPL-MCNC: 24 MG/DL (ref 8–22)
CALCIUM ALBUM COR SERPL-MCNC: 8.5 MG/DL (ref 8.5–10.5)
CALCIUM SERPL-MCNC: 8.2 MG/DL (ref 8.5–10.5)
CHLORIDE SERPL-SCNC: 103 MMOL/L (ref 96–112)
CO2 SERPL-SCNC: 21 MMOL/L (ref 20–33)
CREAT SERPL-MCNC: 0.82 MG/DL (ref 0.5–1.4)
EOSINOPHIL # BLD AUTO: 0.02 K/UL (ref 0–0.51)
EOSINOPHIL NFR BLD: 0.2 % (ref 0–6.9)
ERYTHROCYTE [DISTWIDTH] IN BLOOD BY AUTOMATED COUNT: 45.8 FL (ref 35.9–50)
GFR SERPLBLD CREATININE-BSD FMLA CKD-EPI: 91 ML/MIN/1.73 M 2
GLOBULIN SER CALC-MCNC: 2.5 G/DL (ref 1.9–3.5)
GLUCOSE SERPL-MCNC: 96 MG/DL (ref 65–99)
HCT VFR BLD AUTO: 45.6 % (ref 42–52)
HGB BLD-MCNC: 15.2 G/DL (ref 14–18)
IMM GRANULOCYTES # BLD AUTO: 0.1 K/UL (ref 0–0.11)
IMM GRANULOCYTES NFR BLD AUTO: 0.8 % (ref 0–0.9)
LYMPHOCYTES # BLD AUTO: 2.37 K/UL (ref 1–4.8)
LYMPHOCYTES NFR BLD: 18.6 % (ref 22–41)
MAGNESIUM SERPL-MCNC: 2.5 MG/DL (ref 1.5–2.5)
MCH RBC QN AUTO: 30.8 PG (ref 27–33)
MCHC RBC AUTO-ENTMCNC: 33.3 G/DL (ref 32.3–36.5)
MCV RBC AUTO: 92.5 FL (ref 81.4–97.8)
MONOCYTES # BLD AUTO: 1.4 K/UL (ref 0–0.85)
MONOCYTES NFR BLD AUTO: 11 % (ref 0–13.4)
NEUTROPHILS # BLD AUTO: 8.85 K/UL (ref 1.82–7.42)
NEUTROPHILS NFR BLD: 69.2 % (ref 44–72)
NRBC # BLD AUTO: 0 K/UL
NRBC BLD-RTO: 0 /100 WBC (ref 0–0.2)
PLATELET # BLD AUTO: 242 K/UL (ref 164–446)
PMV BLD AUTO: 9.7 FL (ref 9–12.9)
POTASSIUM SERPL-SCNC: 4.1 MMOL/L (ref 3.6–5.5)
PROT SERPL-MCNC: 6.1 G/DL (ref 6–8.2)
RBC # BLD AUTO: 4.93 M/UL (ref 4.7–6.1)
SODIUM SERPL-SCNC: 136 MMOL/L (ref 135–145)
WBC # BLD AUTO: 12.8 K/UL (ref 4.8–10.8)

## 2025-03-22 PROCEDURE — 770010 HCHG ROOM/CARE - REHAB SEMI PRIVAT*

## 2025-03-22 PROCEDURE — 97166 OT EVAL MOD COMPLEX 45 MIN: CPT

## 2025-03-22 PROCEDURE — 85025 COMPLETE CBC W/AUTO DIFF WBC: CPT

## 2025-03-22 PROCEDURE — 92610 EVALUATE SWALLOWING FUNCTION: CPT | Performed by: SPEECH-LANGUAGE PATHOLOGIST

## 2025-03-22 PROCEDURE — 82306 VITAMIN D 25 HYDROXY: CPT

## 2025-03-22 PROCEDURE — 97112 NEUROMUSCULAR REEDUCATION: CPT

## 2025-03-22 PROCEDURE — 97535 SELF CARE MNGMENT TRAINING: CPT

## 2025-03-22 PROCEDURE — 80053 COMPREHEN METABOLIC PANEL: CPT

## 2025-03-22 PROCEDURE — 92523 SPEECH SOUND LANG COMPREHEN: CPT | Performed by: SPEECH-LANGUAGE PATHOLOGIST

## 2025-03-22 PROCEDURE — 83735 ASSAY OF MAGNESIUM: CPT

## 2025-03-22 PROCEDURE — A9270 NON-COVERED ITEM OR SERVICE: HCPCS | Performed by: PHYSICAL MEDICINE & REHABILITATION

## 2025-03-22 PROCEDURE — 700102 HCHG RX REV CODE 250 W/ 637 OVERRIDE(OP): Performed by: PHYSICAL MEDICINE & REHABILITATION

## 2025-03-22 PROCEDURE — 97162 PT EVAL MOD COMPLEX 30 MIN: CPT

## 2025-03-22 PROCEDURE — 99232 SBSQ HOSP IP/OBS MODERATE 35: CPT | Performed by: PHYSICAL MEDICINE & REHABILITATION

## 2025-03-22 PROCEDURE — 36415 COLL VENOUS BLD VENIPUNCTURE: CPT

## 2025-03-22 PROCEDURE — 97530 THERAPEUTIC ACTIVITIES: CPT

## 2025-03-22 PROCEDURE — 700111 HCHG RX REV CODE 636 W/ 250 OVERRIDE (IP): Performed by: PHYSICAL MEDICINE & REHABILITATION

## 2025-03-22 RX ADMIN — PREDNISONE 50 MG: 5 TABLET ORAL at 09:54

## 2025-03-22 RX ADMIN — LEVETIRACETAM 500 MG: 500 TABLET, FILM COATED ORAL at 09:55

## 2025-03-22 RX ADMIN — BUTALBITAL, ACETAMINOPHEN, AND CAFFEINE 1 TABLET: 325; 50; 40 TABLET ORAL at 17:02

## 2025-03-22 RX ADMIN — OMEPRAZOLE 20 MG: 20 CAPSULE, DELAYED RELEASE ORAL at 09:55

## 2025-03-22 RX ADMIN — LEVETIRACETAM 500 MG: 500 TABLET, FILM COATED ORAL at 20:50

## 2025-03-22 RX ADMIN — SENNOSIDES AND DOCUSATE SODIUM 2 TABLET: 50; 8.6 TABLET ORAL at 20:50

## 2025-03-22 RX ADMIN — ACETAMINOPHEN 1000 MG: 500 TABLET ORAL at 20:49

## 2025-03-22 RX ADMIN — BUTALBITAL, ACETAMINOPHEN, AND CAFFEINE 1 TABLET: 325; 50; 40 TABLET ORAL at 10:04

## 2025-03-22 ASSESSMENT — BALANCE ASSESSMENTS
REACHING FORWARD WITH OUTSTRETCHED ARM WHILE STANDING: 1
STANDING UNSUPPORTED ONE FOOT IN FRONT: 0
SITTING UNSUPPORTED: 4
LONG VERSION TOTAL SCORE (MAX 56): 26
TURN 360 DEGREES: 1
STANDING UNSUPPORTED: 4
STANDING UNSUPPORTED WITH FEET TOGETHER: 1
STANDING ON ONE LEG: 1
LONG VERSION TOTAL SCORE (MAX 56): 26
STANDING TO SITTING: 3
MEDICARE IMPAIRMENT PERCENTAGE: 54
STANDING UNSUPPORTED WITH EYES CLOSED: 3
PICK UP OBJECT FROM THE FLOOR FROM A STANDING POSITION: 0
SITTING TO STANDING: 3
PLACE ALTERNATE FOOT ON STEP OR STOOL WHILE STANDING UNSUPPORTED: 1
LOOK OVER LEFT AND RIGHT SHOULDERS WHILE STANDING: 1
TRANSFERS: 3

## 2025-03-22 ASSESSMENT — BRIEF INTERVIEW FOR MENTAL STATUS (BIMS)
INITIAL REPETITION OF BED BLUE SOCK - FIRST ATTEMPT: 3
WHAT MONTH IS IT: ACCURATE WITHIN 5 DAYS
ASKED TO RECALL BED: NO, COULD NOT RECALL
ASKED TO RECALL SOCK: NO, COULD NOT RECALL
BIMS SUMMARY SCORE: 5
INITIAL REPETITION OF BED BLUE SOCK - FIRST ATTEMPT: 3
WHAT YEAR IS IT: NONSENSICAL
ASKED TO RECALL SOCK: NO, COULD NOT RECALL
ASKED TO RECALL BLUE: NO, COULD NOT RECALL
ASKED TO RECALL BLUE: NO, COULD NOT RECALL
WHAT YEAR IS IT: NONSENSICAL
BIMS SUMMARY SCORE: 3
WHAT MONTH IS IT: NONSENSICAL
ASKED TO RECALL BED: NO, COULD NOT RECALL
WHAT DAY OF THE WEEK IS IT: NONSENSICAL
WHAT DAY OF THE WEEK IS IT: INCORRECT

## 2025-03-22 ASSESSMENT — PATIENT HEALTH QUESTIONNAIRE - PHQ9
SUM OF ALL RESPONSES TO PHQ9 QUESTIONS 1 AND 2: 0
1. LITTLE INTEREST OR PLEASURE IN DOING THINGS: NOT AT ALL
2. FEELING DOWN, DEPRESSED, IRRITABLE, OR HOPELESS: NOT AT ALL

## 2025-03-22 ASSESSMENT — ACTIVITIES OF DAILY LIVING (ADL): TOILETING: INDEPENDENT

## 2025-03-22 NOTE — THERAPY
"Speech Language Pathology   Initial Assessment     Patient Name:  Lico Caba  Age:  74 y.o., Sex:  male  Medical Record #:  [id number]  Today's Date: 3/22/2025     Subjective: Pt pleasant and cooperative, hyper verbal, spouse and twin sister present and supportive during speech language and oral pharyngeal evaluations.  Per chart review: \"75 y/o male with no pmh presented 3/16/25 with aphasia, Headache. 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 No surgical intervension. Continue keppra for seizure prohalaxis. On prednisone for headache. Will need followup MRI to rule out AVM.     Today he states no headache. Follows simple commands but complex commands break down very quickly. Moving all extremities\"     Objective:    03/22/25 0801   Evaluation Charges   Charges Yes   SLP Speech Language Evaluation Speech Sound Language Comprehension   SLP Oral Pharyngeal Evaluation Oral Pharyngeal Evaluation   SLP Total Time Spent   SLP Individual Total Time Spent (Mins) 90   Precautions   Precautions Fall Risk;Cognition/Communication;Swallow   Cognition/Communication Confused;Expressive Aphasia;Receptive Aphasia;Hearing Aids   Swallow Therapeutic dining;Modified diet  (T-dine for swallow and self feeding d/t r neglect/field cut)   Comments R visual field cut   Pain 0 - 10 Group   Pain Rating Scale (NPRS) 0   Cognition    Speech/ Communication Expressive Aphasia;Hard of Hearing;Receptive Aphasia;Word Finding Impairment   Level of Consciousness Alert   Ability To Follow Commands 1 Step   Safety Awareness Impulsive;Impaired   New Learning Impaired   Attention Impaired   Sequencing Impaired   Comments Pt verbose during evaluation, impulsive when self feeding   Cognition - Detailed   Cognitive-Linguistic (WDL) X   Attention to Task Moderate (3)   Simple Attention Moderate (3)   Simple Information Processing Minimal (4)   Verbal Short Term Memory 5 Minutes   ABS (Agitated Behavior Scale)   Agitated Behavior Scale " "Performed No   Prior Living Situation   Prior Services None;Home-Independent   Housing / Facility 1 Story House   Lives with - Patient's Self Care Capacity Spouse   Prior Level Of Function   Communication Within Functional Limits   Swallow Within Functional Limits   Dentition Intact   Dentures None   Hearing Impaired Both Ears   Hearing Aid Right;Left   Vision Wears Corrective Lenses   Patient's Primary Language English   Occupation (Pre-Hospital Vocational) Retired Due To Age   Medication Management Managed Independently   Finances Managed Independently   Driving Independent   Diet on Admission Thin (0);Soft and Bite Sized (6)   Brief Interview for Mental Status (BIMS)   Repetition of Three Words (First Attempt) 3   Temporal Orientation: Year Nonsensical   Temporal Orientation: Month Accurate within 5 days   Temporal Orientation: Day Incorrect   Recall: \"Sock\" No, could not recall   Recall: \"Blue\" No, could not recall   Recall: \"Bed\" No, could not recall   BIMS Summary Score 5   Confusion Assessment Method (CAM)   Inattention Behavior continuously present, does not fluctuate   Disorganized thinking Behavior continuously present, does not fluctuate   Altered level of consciousness Behavior not present   Swallowing/Nutritional Status   Swallowing/Nutritional Status Modified food consistency;Supervision during eating   Eating   Assistance Needed Set-up / clean-up;Supervision;Verbal cues   Physical Assistance Level 25% or less   Comment Pt presenting with R visual field cut and requiring MOD verbal cues to locate food/items on R side of tray.   CARE Score - Eating 3   Eating   Level of Assist Set Up;Supervised;Moderate Assist   Impairment Assessments   Impairment Assessments Comprehension;Expression;Social Interaction;Problem Solving;Memory;Dysphagia   Comprehension Level of Assist Moderate Assist   Comprehension Description Glasses;Cueing needed;Extra time needed;Hearing aids/Amplifiers   Expression Level of Assist " Moderate Assist   Expression Description Set up of equipment;Cueing needed;Extra time needed   Social Interaction Level of Assist Stand By Assist   Social Interaction Description Cueing needed   Problem Solving Level of Assist Moderate Assist   Problem Solving Description Cueing needed;Extra time needed   Memory Level of Assist Maximal Assist   Memory Description Cueing needed;Extra time needed   Receptive Language / Auditory Comprehension   Receptive Language / Auditory Comprehension X   Identifies Pictures Supervision (5)   Follows One Unit Commands Minimal (4)   Follows Two Unit Commands Severe (2)   Expressive Language   Expressive Language (WDL) X   Naming Minimal (4)   Explains Functions Of Objects Moderate (3)   Repeats Speech Accurately Supervision (5)   Automatic Language Appropriate Supervision (5)   Verbalizes Wants / Needs Supervision (5)   Sustain Dialogue Within Given Topic Moderate (3)   Word Finding Deficits Severe (2)   Dysphagia    Diet / Liquid Recommendation Thin (0);Soft & Bite-Sized (6) - (Dysphagia III)   Medication Administration  Whole with Liquid Wash  (One at a time)   Compensatory Strategies No Straws;Multiple Swallows;Effortful Swallows;No Talking During Eating / Drinking;Alternate Solids / Liquids   Nutritional Liquid Intake Rating Scale Non thickened beverages   Nutritional Food Intake Rating Scale Total oral diet with multiple consistencies without special preparation but with specific food limitations   Therapy Interventions Dysphagia Therapy By Speech Language Pathologist;Therapeutic Dining For Meals  (T-dine for self feeding d/t R field cut)   Skilled Intervention Compensatory Strategies;Tactile Cueing;Verbal Cueing;External Aids   Barriers To Oral Feeding   Barriers To Oral Feeding Impaired Cognition / Attention;Impulsivity / Impaired Safety  (R visual field cut)   Speech Mechanisms / Voice Production   Speech Mechanisms / Voice Production (WDL) WDL   Labial Function   Labial  Function (WDL) WDL   Lingual Function   Lingual Function (WDL) WDL   Jaw Function   Jaw Function (WDL) WDL   Velar Function   Velar Function (WDL) WDL   Laryngeal Function   Laryngeal Function (WDL) WDL   Outcome Measures   Outcome Measures Utilized SCCAN;MASA   MASA (Dougherty Assessment of Swallowing Ability)   Alertness 10   Cooperation 10   Auditory Comprehension 6   Respiration 10   Respiratory Rate for Swallow 5   Dysphasia 3   Dyspraxia 5   Dysarthria 5   Saliva 5   Lip Seal 5   Tongue Movement 10   Tongue Strength 10   Tongue Coordination 10   Oral Preparation 10   Gag 1   Palate 10   Bolus Clearance 8   Oral Transit 10   Cough Reflex 5   Voluntary Cough 10   Voice 8   Trache 10   Pharygneal Phase 10   Pharyngeal Response 10   Diet Recommendations Mechanical soft with chopped meat   Fluid Recommendations Regular   Swallow Integrity Unlikely dysphagia/aspiration   Total Score 186   Dysphagia Severity No abnormality detected   Aspiration Severity No abnormality detected   SCCAN (Scales of Cognitive and Communicative Ability for Neurorehabilitation)   Oral Expression - Raw Score 8   Oral Expression - Scale Performance Score 42   Orientation - Raw Score 7   Orientation - Scale Performance Score 58   Memory - Raw Score 4   Memory - Scale Performance Score 21   Speech Comprehension - Raw Score 6   Speech Comprehension - Scale Performance Score 46   Interdisciplinary Communication   Other Patient Needs Up in wheelchair for all meals   Patient Scheduling Information   SLP Time 90 minutes   Primary or Coverage ST CW cog/swallow   T-Dine Yes   Problem List   Problem List Communication Deficits;Aphasia;Dysphagia;Attention Deficit;Memory Deficit;Impaired Safety   Strengths & Barriers   Strengths Able to follow instructions;Pleasant and cooperative;Supportive family;Willingly participates in therapeutic activities;Motivated for self care and independence   Barriers Aphasia expressive;Aphasia receptive;Impaired functional  cognition;Visual impairment   Benefit   Therapy Benefit Patient would benefit from Inpatient Rehab Speech-Language Pathology to address above identified deficits.   Current Discharge Plan   Current Discharge Plan Return to Prior Living Situation   Interdisciplinary Plan of Care Collaboration   IDT Collaboration with  Family / Caregiver;Occupational Therapist;Therapy Tech   Patient Position at End of Therapy Seated;Chair Alarm On;Self Releasing Lap Belt Applied;Tray Table within Reach;Family / Friend in Room   Collaboration Comments POC       Patient has been educated on the following items: speech therapy role and speech therapy plan of care    Assessment:     Oral Pharyngeal Evaluation:  Oral mech examination unremarkable for all oral structures' ROM, speed, and strength. Pt assessed with 6- Soft & Bite Sized and 0-Thin liquids. Pt req'd MOD verbal cues to slow rate and decrease bite size as well as locate food and items on the right side of his tray. No overt clinical s/sx of aspiration observed during PO intake.   REC: T-dine to address diet advancement and self feeding.  Speech-language pathologists (SLPs) use the clinical swallow evaluation (CSE) to determine overall clinical status, cognition, oral structure function, and oral mastication skills as they relate to swallowing. Results are used to determine the presence or absence of dysphagia and readiness to begin an oral diet and take medication orally. The CSE cannot determine the presence or absence of aspiration, but may warrant further instrumental evaluation utilizing flexible endoscopic evaluation of swallowing (FEES) and/or video-fluoroscopic evaluation of swallowing (VFSS).    Speech Language Evaluation:  The Scales of Cognitive and Communicative Ability for Neurorehabilitation (SCCAN) assesses cognitive-communicative deficits and functional ability in patients in rehabilitation hospitals, clinics, and skilled nursing facilities. The SCCAN is  appropriate for a broad range of neurological patients, provides a measure of both impairment and functional ability.     SCCAN (Scales of Cognitive and Communicative Ability for Neurorehabilitation)  Oral Expression - Raw Score: 8  Oral Expression - Scale Performance Score: 42  Orientation - Raw Score: 7  Orientation - Scale Performance Score: 58  Memory - Raw Score: 4  Memory - Scale Performance Score: 21  Speech Comprehension - Raw Score: 6  Speech Comprehension - Scale Performance Score: 46    Pt presenting with significant word finding and memory, especially short term memory deficits. Pt benefits from verbal cues to attend to task as he is verbose and can be tangential, however, easily redirected.  REC: Speech language therapy addressing word finding and memory, further goals developed upon completion of SCCAN.        Patient is 74 y.o. male with a diagnosis of Intracranial hemorrhage, spontaneous intraparenchymal, idiopathic, acute (HCC) [I62.9]    Currently, the patient has the following precautions: Fall Risk: Poor balance, Poor safety, Impulsive, Alarming seat belt, Low vision, Visual spatial neglect/Neglect program, Cognition/Communication: Confused, Expressive Aphasia, Receptive Aphasia, Hearing Aids, Swallow: Therapeutic dining, Modified diet (T-dine for swallow and self feeding d/t r neglect/field cut), Comments: R visual field cut    Patient PMH includes:   Past Medical History:   Diagnosis Date    Arthritis     back right leg    Bowel habit changes     constipation    Heart murmur     states he was told he had a murmur with in the service    Pain 01/04/2022    pain 10/10    Snoring      Patient's Past Surgical History:   Past Surgical History:   Procedure Laterality Date    PB LAMINOTOMY,LUMBAR DISK,1 INTRSP  1/5/2022    Procedure: L3/4 and L4/5  decompressive laminectomy and right L3/4 discectomy, L3/4 and L4/5 bilateral foraminotomies;  Surgeon: Allyson Sutton M.D.;  Location: SURGERY [facility]  [place];  Service: Orthopedics    LUMBAR DECOMPRESSION  1/5/2022    Procedure: DECOMPRESSION, SPINE, LUMBAR;  Surgeon: Allyson Sutton M.D.;  Location: SURGERY [facility];  Service: Orthopedics    FORAMINOTOMY  1/5/2022    Procedure: FORAMINOTOMY, SPINE;  Surgeon: Allyson Sutton M.D.;  Location: SURGERY [facility];  Service: Orthopedics    OTHER      tonsils       ST Oral Pharyngeal and Cognitive/Linguistic evaluation(s) performed today; functional performance at today's assessment is as above. At baseline, the patient was independent with all ADLs and IADLs . The patient is limited by the following barriers: Aphasia expressive, Aphasia receptive, Impaired functional cognition, Visual impairment    Additional factors influencing patient status and progress include: prior level of function, PMH, and the above co morbidities.    The patient is performing well below their baseline level of function and will benefit from an interdisciplinary high intensity rehabilitation program to maximize functional independence, decrease burden of care, and support a safe return home with spousal support.    Plan:   Recommend Speech Therapy  minutes per day 5-7 days per week for 2 weeks for the following treatments:  SLP Speech Language Treatment, SLP Swallowing Dysfunction Treatment, SLP Self Care / ADL Training , SLP Cognitive Skill Development, and SLP Group Treatment.    Passport items to be completed:  Express basic needs, understand food/liquid recommendations, consistently follow swallow precautions, manage finances, manage medications, arrive to therapy appointments on time, complete daily memory log entries, solve problems related to safety situations, review education related to hospitalization, complete caregiver training     Goals:  Long term and short term goals have been discussed with patient and spouse and they are in agreement.    Speech Therapy Problems (Active)       Problem: Expression STGs       Dates:  Start:  03/22/25         Goal: STG-Within one week, patient will       Dates: Start:  03/22/25       Description: 1) Individualized goal:  describe selected vocabulary by semantic features with 80% acc and MIN A.   2) Interventions:  SLP Speech Language Treatment, SLP Self Care / ADL Training , and SLP Group Treatment                Problem: Memory STGs       Dates: Start:  03/22/25         Goal: STG-Within one week, patient will       Dates: Start:  03/22/25       Description: 1) Individualized goal:  complete working memory tasks w/ 80% accuracy and MIN A.   2) Interventions:  SLP Self Care / ADL Training , SLP Cognitive Skill Development, and SLP Group Treatment                Problem: Problem Solving STGs       Dates: Start:  03/22/25         Goal: STG-Within one week, patient will       Dates: Start:  03/22/25       Description: 1) Individualized goal:  complete SCCAN w/ further goals developed as appropriate.   2) Interventions:  SLP Cognitive Skill Development             Goal: STG-Within one week, patient will       Dates: Start:  03/22/25       Description: 1) Individualized goal:  complete right scanning/neglect tasks w/ MIN A and 80% acc.   2) Interventions:  SLP Self Care / ADL Training , SLP Cognitive Skill Development, and SLP Group Treatment                Problem: Social Interaction STGs       Dates: Start:  03/22/25         Goal: STG-Within one week, patient will       Dates: Start:  03/22/25               Problem: Speech/Swallowing LTGs       Dates: Start:  03/22/25         Goal: LTG-By discharge, patient will safely swallow       Dates: Start:  03/22/25       Description: 1) Individualized goal:  regular textures and thin liquids implementing safe swallow strategies in >90% of opportunities with MOD I for a safe D/C to PLOF.   2) Interventions:  SLP Swallowing Dysfunction Treatment, SLP Self Care / ADL Training , and SLP Group Treatment             Goal: LTG-By discharge, patient will solve basic  problems       Dates: Start:  03/22/25       Description: 1) Individualized goal:  w/ 80% acc and MOD I for a safe D/C to PLOF.   2) Interventions:  SLP Speech Language Treatment, SLP Self Care / ADL Training , SLP Cognitive Skill Development, and SLP Group Treatment                Problem: Swallowing STGs       Dates: Start:  03/22/25         Goal: STG-Within one week, patient will       Dates: Start:  03/22/25

## 2025-03-22 NOTE — PROGRESS NOTES
1155 Pt [facility] at [facility] Dr. Klein to follow. Initial assessment initiated. Pt oriented to room and facility routine and safety measures; pt education binder provided and discussed. Pt A/O x 3,disoriented to time, and continent of bowel and bladder. Minimal assist for transfers. All wounds photographed and documented; photos uploaded to . Pt denies pain or discomfort at this time. Pt positioned for comfort in bed. Call light within reach, safety measures in place. Will continue to monitor.

## 2025-03-22 NOTE — PROGRESS NOTES
4 Eyes Skin Assessment Completed by PALAK Drummond and PALAK Pappas.    Head WDL  Ears WDL  Nose WDL  Mouth WDL  Neck WDL  Breast/Chest WDL  Shoulder Blades WDL  Spine WDL  (R) Arm/Elbow/Hand WDL  (L) Arm/Elbow/Hand WDL  Abdomen WDL  Groin WDL  Scrotum/Coccyx/Buttocks WDL  (R) Leg WDL  (L) Leg WDL  (R) Heel/Foot/Toe WDL  (L) Heel/Foot/Toe WDL        Interventions In Place Waffle Overlay and Pillows    Possible Skin Injury No    Pictures Uploaded Into Epic Yes  Wound Consult Placed N/A  RN Wound Prevention Protocol Ordered Yes

## 2025-03-22 NOTE — PROGRESS NOTES
NURSING DAILY NOTE    Name: Lico Caba   Date of Admission: 3/21/2025   Admitting Diagnosis: Intracranial hemorrhage, spontaneous intraparenchymal, idiopathic, acute ([facility])  Attending Physician: SAVITA GOODMAN M.D.  Allergies: Patient has no known allergies.    Safety  Patient Assist  cga  Patient Precautions     Bed Transfer Status     Toilet Transfer Status      Assistive Devices  Wheelchair, Rails  Oxygen  None - Room Air  Diet/Therapeutic Dining  Current Diet Order   Procedures    Diet Order Diet: Level 6 - Soft and Bite Sized; Liquid level: Level 0 - Thin     Pill Administration  whole  Agitated Behavioral Scale  18  ABS Level of Severity  No Agitation    Fall Risk  Has the patient had a fall this admission?      Genesis Elizondo Fall Risk Scoring  22, HIGH RISK  Fall Risk Safety Measures  bed alarm, chair alarm, and seatbelt alarm    Vitals  Temperature: 36.4 °C (97.5 °F)  Temp src: Temporal  Pulse: 68  Respiration: 18  Blood Pressure : (!) 147/86  Blood Pressure MAP (Calculated): 106 MM HG  BP Location: Right, Upper Arm  Patient BP Position: Supine     Oxygen  Pulse Oximetry: 97 %  O2 (LPM): 0  O2 Delivery Device: None - Room Air    Bowel and Bladder  Last Bowel Movement  03/20/25 (Per report)  Stool Type     Bowel Device     Continent  Bladder: Did not void   Bowel: No movement  Bladder Function  Urine Void (mL):  (bathroom)  Number of Times Voided: 1  Genitourinary Assessment   Bladder Assessment (WDL):  Within Defined Limits  Bladder Device: Bathroom  Bladder Scan: Post Void  $ Bladder Scan Results (mL): 68    Skin  Dereje Score   19  Sensory Interventions   Bed Types: Standard/Trauma Mattress  Skin Preventative Measures: Pillows in Use for Support / Positioning  Moisture Interventions         Pain  Pain Rating Scale  0 - No Pain  Pain Location     Pain Location Orientation     Pain Interventions   Declines    ADLs    Bathing      Linen Change      Personal Hygiene     Chlorhexidine Bath       Oral Care     Teeth/Dentures     Shave     Nutrition Percentage Eaten     Environmental Precautions  Treaded Slipper Socks on Patient, Personal Belongings, Wastebasket, Call Bell etc. in Easy Reach, Bed in Low Position  Patient Turns/Positioning  Patient turns self independently side to side without assistance, to offload sacral area  Patient Turns Assistance/Tolerance     Bed Positions  Bed Controls On, Bed Locked  Head of Bed Elevated  Less than 30 degrees      Psychosocial/Neurologic Assessment  Psychosocial Assessment  Psychosocial (WDL):  Within Defined Limits  Neurologic Assessment  Neuro (WDL): Exceptions to WDL  Level of Consciousness: Alert  Orientation Level: Disoriented to time  Cognition: Follows commands, Poor attention/concentration, Appropriate safety awareness  Speech: Slurred  Motor Function/Sensation Assessment: Motor strength  Muscle Strength Right Arm: Good Strength Against Gravity and Moderate Resistance  Muscle Strength Left Arm: Good Strength Against Gravity and Moderate Resistance  Muscle Strength Right Leg: Good Strength Against Gravity and Moderate Resistance  Muscle Strength Left Leg: Good Strength Against Gravity and Moderate Resistance  EENT (WDL):  WDL Except    Cardio/Pulmonary Assessment  Edema      Respiratory Breath Sounds  RUL Breath Sounds: Clear  RML Breath Sounds: Clear  RLL Breath Sounds: Diminished  ZACH Breath Sounds: Clear  LLL Breath Sounds: Diminished  Cardiac Assessment   Cardiac (WDL):  Within Defined Limits

## 2025-03-22 NOTE — THERAPY
Occupational Therapy   Initial Evaluation     Patient Name:  Lico Caba  Age:  74 y.o., Sex:  male  Medical Record #:  [id number]  Today's Date: 3/22/2025     Subjective: Pt supine in bed sleeping, but easily aroused, and agreeable to OT eval and tx.      Objective:    03/22/25 0701   OT Charge Group   Charges Yes   OT Self Care / ADL (Units) 1   OT Evaluation OT Evaluation Mod   OT Total Time Spent   OT Individual Total Time Spent (Mins) 60   Precautions   Precautions Fall Risk;Cognition/Communication   Fall Risk Poor balance;Poor safety;Impulsive;Alarming seat belt;Low vision;Visual spatial neglect/Neglect program   Cognition/Communication Confused;Expressive Aphasia;Receptive Aphasia   Comments R-visual field cut   Pain 0 - 10 Group   Pain Rating Scale (NPRS) 0   Bladder   Urine Color Yellow   Number of Times Voided 1   Bladder Device Bathroom   Cognition    Cognition / Consciousness X   Speech/ Communication Expressive Aphasia;Word Finding Impairment   Level of Consciousness Alert   Ability To Follow Commands 1 Step   Safety Awareness Impaired;Impulsive   New Learning Impaired   Attention Impaired   Sequencing Impaired   Initiation Impaired   Comments pt verbose and impulsive throughout session   ABS (Agitated Behavior Scale)   Agitated Behavior Scale Performed Yes   Short Attention Span, Easy Distractibility, Inability to Concentrate 3   Impulsive, Impatient, Low Tolerance for Pain or Frustration 3   Uncooperative, Resistant to Care, Demanding 1   Violent and/or Threatening Violence Toward People or Property 1   Explosive and/or Unpredictable Anger 1   Rocking, Rubbing, Moaning, Other Self-Stimulating Behavior 2   Pulling at Tubes, Restraints, etc. 1   Wandering from Treatment Area 1   Restlessness, Pacing, Excessive Movement 2   Repetitive Behaviors, Motor and/or Verbal 2   Rapid, Loud or Excessive Talking 1   Sudden Changes of Mood 1   Easily Initiated - Excessive Crying and/or Laughter 1  "  Self-Abusiveness, Physical and/or Verbal 1   Agitated Behavior Scale Total Score 21   Level of Severity No Agitation   Prior Living Situation   Prior Services None;Home-Independent   Housing / Facility 1 Story House   Steps Into Home 1   Steps In Home 0   Bathroom Set up Walk In Shower;Bathtub / Shower Combination;Shower Glass Doors   Equipment Owned None   Lives with - Patient's Self Care Capacity Spouse   Comments Pt lives with spouse in [facility]   Prior Level of ADL Function   Self Feeding Independent   Grooming / Hygiene Independent   Bathing Independent   Dressing Independent   Toileting Independent   Prior Level of IADL Function   Medication Management Independent   Laundry Independent   Kitchen Mobility Independent   Finances Independent   Home Management Independent   Shopping Independent   Prior Level Of Mobility Independent Without Device in Community;Independent With Steps in Community;Independent Without Device in Home;Independent With Steps in Home   Driving / Transportation Driving Independent   Occupation (Pre-Hospital Vocational) Retired Due To Age   Prior Functioning: Everyday Activities   Self Care Independent   Indoor Mobility (Ambulation) Independent   Stairs Independent   Functional Cognition Independent   Prior Device Use None of the given options   Cognitive Pattern Assessment   Cognitive Pattern Assessment Used BIMS   Brief Interview for Mental Status (BIMS)   Repetition of Three Words (First Attempt) 3   Temporal Orientation: Year Nonsensical   Temporal Orientation: Month Nonsensical   Temporal Orientation: Day Nonsensical  (\"January\")   Recall: \"Sock\" No, could not recall   Recall: \"Blue\" No, could not recall   Recall: \"Bed\" No, could not recall   BIMS Summary Score 3   Confusion Assessment Method (CAM)   Is there evidence of an acute change in mental status from the patient's baseline? Yes   Inattention Behavior present, fluctuates (comes and goes, " changes in severity)   Disorganized thinking Behavior present, fluctuates (comes and goes, changes in severity)   Altered level of consciousness Behavior not present   Vision Screen   Vision Not tested  (Unable to assess due to aphasia, Very apparent R-visual field cut in function)   Impairment Assessments   Impairment Assessments Active ROM;Strength;Sensation;Balance;Vision   Active ROM Upper Body   Active ROM Upper Body  WDL   Strength Upper Body   Upper Body Strength  WDL   Sensation Upper Body   Upper Extremity Sensation  WDL   Observed Balance Assessment   Sitting Balance (Static) Independent   Sitting Balance (Dynamic) Stand By Assist   Standing Balance (Static) Stand By Assist   Standing Balance (Dynamic) Contact Guard Assist   Coordination   Coordination (Observed) Apraxia (see laterally in comments)  (R>L)   Coordination (Assessment) Opposition   Opposition   Opposition Impaired   Hearing, Speech, and Vision   Ability to Hear Adequate   Ability to See in Adequate Light Impaired   Expression of Ideas and Wants Some difficulty   Understanding Verbal and Non-Verbal Content Usually understands   Eating   Comment See SLP notes for details   Oral Hygiene   Assistance Needed Physical assistance   Physical Assistance Level 25% or less   CARE Score - Oral Hygiene 3   Oral Hygiene   Level of Assist Minimal Assist   Patient Position Seated   Additional Description Cueing needed;Extra time needed  (Min A for orienting toothbrush, as pt applied toothpaste to wrong end of toothbrush)   Upper Body Dressing   Assistance Needed Set-up / clean-up;Supervision   CARE Score - Upper Body Dressing 4   Upper Body Dressing   Level of Assist Stand by Assist   Patient Position Seated   Clothing Item(s) Pullover shirt   Additional Description Cueing needed;Extra time needed   Lower Body Dressing   Assistance Needed Incidental touching   CARE Score - Lower Body Dressing 4   Lower Body Dressing   Level of Assist Contact Guard Assist    Patient Position Standing;Seated   Clothing Item(s) Pants;Underwear   Additional Description Grab bars;Assist for balance;Cueing needed;Extra time needed   Putting On/Taking Off Footwear   Assistance Needed Supervision;Set-up / clean-up;Verbal cues   CARE Score - Putting On/Taking Off Footwear 4   Putting On/Taking Off Footwear   Level of Assist Stand by Assist   Patient Position Seated   Footwear Type Treaded Socks   Additional Description Cueing needed   Shower/Bathe Self   Assistance Needed Verbal cues;Supervision;Set-up / clean-up;Adaptive equipment;Physical assistance   Physical Assistance Level 25% or less   CARE Score - Shower/Bathe Self 3   Shower/Bathe Self   Level of Assist Minimal Assist   Patient Position Seated;Standing   Adaptive Equipment Fold Down Shower Bench;Grab Bars   Additional Description Assist for balance;Cueing needed;Extra time needed  (Min A for water management due to visual deficits)   Tub/Shower Transfer   Level of Assist Minimal Assist   Transfer Type Stand step transfer   Adaptive Equipment Grab bars   Assistive Device Wheelchair;Fold Down Bench   Additional Description Extra time needed;Cueing needed   Toilet Transfer   Assistance Needed Physical assistance   Physical Assistance Level 25% or less   CARE Score - Toilet Transfer 3   Toilet Transfer   Level of Assist Minimal Assist   Transfer Type Stand Step Transfer   Adaptive Equipment Grab bars   Assistive Device Wheelchair   Additional Description Cueing needed;Extra time needed  (Cues to turn to R-side as pt didn't see toilet)   Toileting Hygiene   Assistance Needed Incidental touching   CARE Score - Toileting Hygiene 4   Toileting Hygiene   Level of Assist Contact Guard Assist   Additional Description Assist for standing balance;Cueing needed;Grab bars;Extra time needed   Lying to Sitting on Side of Bed   Assistance Needed Physical assistance   Physical Assistance Level 25% or less   CARE Score - Lying to Sitting on Side of Bed  3   Lying to Sitting on Side of Bed   Level of Assist Minimal Assist   Additional Description Extra time needed;Cueing needed  (Pt attempted to exit bed on L-side with bed rail in place despite verbal and visual cues to exit R-side of bed.)   Sit to Stand   Assistance Needed Incidental touching   CARE Score - Sit to Stand 4   Sit to Stand   Level of Assist Contact Guard Assist   Assistive Devices No AD   Additional Description Cueing needed;Extra time needed  (To left side)   Chair/Bed-to-Chair Transfer   Assistance Needed Incidental touching   CARE Score - Chair/Bed-to-Chair Transfer 4   Chair/Bed-to-Chair Transfer   Level of Assist Contact Guard Assist   Transfer Type Stand Step Transfer   Assistive Devices Wheelchair   Additional Description Extra time needed;Cueing needed   Interdisciplinary Communication   Other Patient Needs Up in wheelchair for all meals   Patient Scheduling Information   OT Time 30/60 minutes   Primary or Coverage OT Kaya/Abena when primary not available   Problem List   Problem List Decreased Active Daily Living Skills;Decreased Homemaking Skills;Decreased Upper Extremity Strength Right;Decreased Upper Extremity Strength Left;Decreased Functional Mobility;Decreased Activity Tolerance;Safety Awareness Deficits / Cognition;Impaired Coordination Right Upper Extremity;Impaired Coordination Left Upper Extremity;Impaired Cognitive Function;Impaired Vision;Impaired Postural Control / Balance   Strengths & Barriers   Strengths Independent prior level of function;Making steady progress towards goals;Motivated for self care and independence;Pleasant and cooperative;Supportive family;Willingly participates in therapeutic activities   Barriers Aphasia expressive;Aphasia receptive;Confused;Decreased endurance;Difficulty following instructions;Fatigue;Generalized weakness;Impaired activity tolerance;Impaired balance;Impaired carryover of learning;Impaired insight/denial of deficits;Impaired  functional cognition;Impulsive;Limited mobility;Visual impairment   Benefit    Therapy Benefit Patient Would Benefit from Inpatient Rehab Occupational Therapy to Maximize [place] with ADLs, IADLs and Functional Mobility.   Current Discharge Plan   Current Discharge Plan Return to Prior Living Situation   Interdisciplinary Plan of Care Collaboration   IDT Collaboration with  Nursing;Speech Therapist;Physical Therapist   Patient Position at End of Therapy Seated;Chair Alarm On;Self Releasing Lap Belt Applied  (Pass off to SLP)   Collaboration Comments CLOF, POC     Patient has been educated on the following items: occupational therapy role, occupational therapy plan of care, rehab expectations, goal setting, ADL needs, and fall risk and use of call light    Assessment:   Patient is a 74 y.o. male with a diagnosis of Intracranial hemorrhage, spontaneous intraparenchymal, idiopathic, acute (HCC) [I62.9].    Currently, the patient has the following precautions: Fall Risk: Poor balance, Poor safety, Impulsive, Alarming seat belt, Low vision, Visual spatial neglect/Neglect program, Cognition/Communication: Confused, Expressive Aphasia, Receptive Aphasia, Hearing Aids, Swallow: Therapeutic dining, Modified diet (T-dine for swallow and self feeding d/t r neglect/field cut), Comments: R visual field cut    Patient's PMH includes:   Past Medical History:   Diagnosis Date    Arthritis     back right leg    Bowel habit changes     constipation    Heart murmur     states he was told he had a murmur with in the service    Pain 01/04/2022    pain 10/10    Snoring      Patient's Past Surgical History:   Past Surgical History:   Procedure Laterality Date    PB LAMINOTOMY,LUMBAR DISK,1 INTRSP  1/5/2022    Procedure: L3/4 and L4/5  decompressive laminectomy and right L3/4 discectomy, L3/4 and L4/5 bilateral foraminotomies;  Surgeon: Allyson Sutton M.D.;  Location: SURGERY [facility];  Service: Orthopedics    LUMBAR  DECOMPRESSION  1/5/2022    Procedure: DECOMPRESSION, SPINE, LUMBAR;  Surgeon: Allyson Sutton M.D.;  Location: SURGERY [facility];  Service: Orthopedics    FORAMINOTOMY  1/5/2022    Procedure: FORAMINOTOMY, SPINE;  Surgeon: Allyson Sutton M.D.;  Location: SURGERY [facility];  Service: Orthopedics    OTHER      tonsils       OT evaluation performed today; functional performance at today's assessment is as above. At baseline, the patient was independent with ADLs/IADLs. The patient is limited by the following barriers: Aphasia expressive, Aphasia receptive, Confused, Decreased endurance, Difficulty following instructions, Fatigue, Generalized weakness, Impaired activity tolerance, Impaired balance, Impaired carryover of learning, Impaired insight/denial of deficits, Impaired functional cognition, Impulsive, Limited mobility, Visual impairment    Additional factors influencing patient status and prognosis include: prior level of function, PMH, and the above comorbidities.    The patient is performing well below their baseline level of function and will benefit from an interdisciplinary high intensity rehabilitation program to maximize functional independence with ADL's, IADL's and functional mobility, decrease burden of care, and support a safe return home with spousal support.    Plan:   Recommend Occupational Therapy  minutes per day 5-7 days per week for 10-12 days for the following treatments:  OT Group Therapy, OT Self Care/ADL, OT Cognitive Skill Dev, OT Community Reintegration, OT Manual Ther Technique, OT Neuro Re-Ed/Balance, OT Therapeutic Activity, OT Evaluation, and OT Therapeutic Exercise.    Passport items to be completed:  Perform bathroom transfers, complete dressing, complete feeding, get ready for the day, prepare a simple meal, participate in household tasks, adapt home for safety needs, demonstrate home exercise program, complete caregiver training     Goals: Long term and short term goals  have been discussed with patient and they are in agreement.    Occupational Therapy Goals (Active)       Problem: Bathing       Dates: Start:  03/22/25         Goal: STG-Within one week, patient will bathe at SBA level.       Dates: Start:  03/22/25               Problem: Dressing       Dates: Start:  03/22/25         Goal: STG-Within one week, patient will dress LB at SBA level.       Dates: Start:  03/22/25               Problem: Functional Transfers       Dates: Start:  03/22/25         Goal: STG-Within one week, patient will transfer to toilet at SBA level.       Dates: Start:  03/22/25            Goal: STG-Within one week, patient will transfer to tub/shower at SBA level.       Dates: Start:  03/22/25               Problem: OT Long Term Goals       Dates: Start:  03/22/25         Goal: LTG-By discharge, patient will complete basic self care tasks at SPV-Mod I level.       Dates: Start:  03/22/25            Goal: LTG-By discharge, patient will perform bathroom transfers at SPV-Mod I level.       Dates: Start:  03/22/25               Problem: Toileting       Dates: Start:  03/22/25         Goal: STG-Within one week, patient will complete toileting tasks at SBA level.       Dates: Start:  03/22/25

## 2025-03-22 NOTE — CARE PLAN
"  Problem: Fall Risk - Rehab  Goal: Patient will remain free from falls  Outcome: Progressing  Note: [facility] Fall risk Assessment Score: 12      Moderate fall risk Interventions  - Bed and strip alarm   - Yellow sign by the door   - Yellow wrist band \"Fall risk\"  - Room near to the nurse station  - Do not leave patient unattended in the bathroom    Fall risk education provided      Problem: Pain - Standard  Goal: Alleviation of pain or a reduction in pain to the patient’s comfort goal  Outcome: Progressing  Note: Assessed for pain and discomfort , pain under control, situated in bed, needs anticipated and attended.   The patient is Stable - Low risk of patient condition declining or worsening    Shift Goals  Clinical Goals: Comfort and safety  Patient Goals: Comfort    Progress made toward(s) clinical / shift goals:  Pt free from fall and injury.    "

## 2025-03-22 NOTE — THERAPY
Physical Therapy   Initial Evaluation     Patient Name:  Lico Caba  Age:  74 y.o., Sex:  male  Medical Record #:  [id number]  Today's Date: 3/22/2025     Subjective: Eager to participate. Receptive>expressive aphasia with conversation     Objective:    03/22/25 1230   PT Charge Group   PT Neuromuscular Re-Education / Balance (Units) 1   PT Therapeutic Activities (Units) 1   PT Evaluation PT Evaluation Mod   PT Total Time Spent   PT Individual Total Time Spent (Mins) 75   Cognition    Speech/ Communication Expressive Aphasia;Receptive Aphasia   Level of Consciousness Alert   Ability To Follow Commands 1 Step  (better with demonstration)   Safety Awareness Impaired;Impulsive   New Learning Impaired   Attention Impaired   Sequencing Impaired   Initiation Impaired   Prior Living Situation   Prior Services Home-Independent   Housing / Facility 1 Story House   Steps Into Home 1   Steps In Home 0   Bathroom Set up Walk In Shower;Bathtub / Shower Combination;Shower Glass Doors   Equipment Owned None   Lives with - Patient's Self Care Capacity Spouse   Prior Level of Functional Mobility   Bed Mobility Independent   Transfer Status Independent   Ambulation Independent   Distance Ambulation Community Distances   Assistive Devices Used None   Stairs Independent   Strength Lower Body   Lower Body Strength  WDL   Sensation Lower Body   Lower Extremity Sensation     (diminshed response to tactile cueing on the right, unable to formally test due to expressive aphasia. R visual field cut)   Roll Left and Right   Assistance Needed Verbal cues   CARE Score - Roll Left and Right 4   Roll Left and Right   Level of Assist Supervised   Sit to Lying   Assistance Needed Verbal cues   CARE Score - Sit to Lying 4   Sit to Lying   Level of Assist Supervised   Lying to Sitting on Side of Bed   Assistance Needed Supervision;Verbal cues   CARE Score - Lying to Sitting on Side of Bed 4   Lying to Sitting on Side of Bed   Level of  "Assist Supervised   Sit to Stand   Assistance Needed Incidental touching   CARE Score - Sit to Stand 4   Sit to Stand   Level of Assist Contact Guard Assist   Assistive Devices No AD   Chair/Bed-to-Chair Transfer   Assistance Needed Physical assistance   Physical Assistance Level 25% or less   CARE Score - Chair/Bed-to-Chair Transfer 3   Chair/Bed-to-Chair Transfer   Level of Assist Minimal Assist   Transfer Type Stand Step Transfer   Assistive Devices Wheelchair   Additional Description Cueing needed;Extra time needed   Toilet Transfer   Assistance Needed Physical assistance   Physical Assistance Level 25% or less   CARE Score - Toilet Transfer 3   Toilet Transfer   Level of Assist Minimal Assist   Transfer Type Stand Step Transfer   Adaptive Equipment Grab bars   Car Transfer   Assistance Needed Physical assistance   Physical Assistance Level 25% or less   CARE Score - Car Transfer 3   Car Transfer   Level of Assist Minimal Assist   Transfer Type Stand Step Transfer   Assistive Device No AD   Walk 10 Feet   Assistance Needed Physical assistance   Physical Assistance Level 25% or less   CARE Score - Walk 10 Feet 3   Walk 50 Feet with Two Turns   Assistance Needed Physical assistance   Physical Assistance Level 25% or less   CARE Score - Walk 50 Feet with Two Turns 3   Walk 150 Feet   Assistance Needed Physical assistance   Physical Assistance Level 25% or less   CARE Score - Walk 150 Feet 3   Walking 10 Feet on Uneven Surfaces   Assistance Needed Physical assistance   Physical Assistance Level 26%-50%   CARE Score - Walking 10 Feet on Uneven Surfaces 3   Ambulation   Level of Assist Minimal Assist   Assistive Device No AD   Additional Description Cueing needed;Extra time needed   Distance 150   1 Step (Curb)   Assistance Needed Physical assistance   Physical Assistance Level 25% or less   CARE Score - 1 Step (Curb) 3   Curbs   Level of Assist Minimal Assist   Assistive Device No AD   Curb Height 6\" step " "  Additional Description Cueing needed;Extra time needed   4 Steps   Assistance Needed Physical assistance   Physical Assistance Level 25% or less   CARE Score - 4 Steps 3   12 Steps   Assistance Needed Physical assistance   Physical Assistance Level 25% or less   CARE Score - 12 Steps 3   Stairs   Level of Assist Minimal Assist   Stair Height 6\" step   Additional Description L handrail;Step to pattern;Cueing needed;Extra time needed   Stairs Amount 12   Picking Up Object   Assistance Needed Physical assistance   Physical Assistance Level 51%-75%   CARE Score - Picking Up Object 2   Wheel 50 Feet with Two Turns   Reason if not Attempted Activity not applicable   CARE Score - Wheel 50 Feet with Two Turns 9   Wheel 150 Feet   Reason if not Attempted Activity not applicable   CARE Score - Wheel 150 Feet 9   Wheelchair   Level of Assist N/A   Ding Balance Scale   Sitting Unsupported (Score 0-4) 4   Change Of Positon: Sitting To Standing (Score 0-4) 3   Change Of Positon: Standing To Sitting (Score 0-4) 3   Transfers (Score 0-4) 3   Standing Unsupported (Score 0-4) 4   Standing With Eyes Closed (Score 0-4) 3   Standing With Feet Together (Score 0-4) 1   Tandem Standing (Score 0-4) 0   Standing On One Leg (Score 0-4) 1   Turning Trunk (Feet Fixed) (Score 0-4) 1   Retrieving Objects From Floor (Score 0-4) 0   Turning 360 Degrees (Score 0-4) 1   Stool Stepping (Score 0-4) 1   Reaching Forward While Standing (Score 0-4) 1   Ding Balance Total Score (0-56) 26   Patient Scheduling Information   PT Time 30/60 minutes   Primary or Coverage PT NEYDA   Problem List    Problems Impaired Transfers;Impaired Ambulation;Impaired Balance;Impaired Coordination;Safety Awareness Deficits / Cognition;Motor Planning / Sequencing   Strengths & Barriers   Strengths Adequate strength;Independent prior level of function;Making steady progress towards goals;Motivated for self care and independence;Pleasant and cooperative;Supportive family;Willingly " participates in therapeutic activities   Barriers Aphasia expressive;Aphasia receptive;Hearing impairment;Impaired balance;Impaired insight/denial of deficits;Impaired functional cognition;Visual impairment   Benefit   Therapy Benefit Patient Would Benefit from Inpatient Rehabilitation Physical Therapy to Maximize Functional [place] with ADLs, IADLs and Mobility.   Current Discharge Plan   Current Discharge Plan Return to Prior Living Situation   PT DME Recommendations   Assistive Device   (none anticipated)   Interdisciplinary Plan of Care Collaboration   IDT Collaboration with  Occupational Therapist;Family / Caregiver   Patient Position at End of Therapy Seated;Chair Alarm On;Self Releasing Lap Belt Applied;Family / Friend in Room   Collaboration Comments CLOF/POC; spouse, Cecy, present during eval     Patient has been educated on the following items: physical therapy role, physical therapy plan of care, rehab expectations, goal setting, mobility needs, patient passport, and fall risk and use of call light        Assessment:    Patient is a 74 y.o. male with a diagnosis of Intracranial hemorrhage, spontaneous intraparenchymal, idiopathic, acute (HCC) [I62.9]. Left tempopariental hemorrhagic CVA    Currently, the patient has the following precautions: Fall Risk: Poor balance, Poor safety, Impulsive, Alarming seat belt, Low vision, Visual spatial neglect/Neglect program, Cognition/Communication: Confused, Expressive Aphasia, Receptive Aphasia, Comments: R-visual field cut    Patient's PMH includes:   Past Medical History:   Diagnosis Date    Arthritis     back right leg    Bowel habit changes     constipation    Heart murmur     states he was told he had a murmur with in the service    Pain 01/04/2022    pain 10/10    Snoring      Patient's Past Surgical History:   Past Surgical History:   Procedure Laterality Date    PB LAMINOTOMY,LUMBAR DISK,1 INTRSP  1/5/2022    Procedure: L3/4 and L4/5  decompressive  laminectomy and right L3/4 discectomy, L3/4 and L4/5 bilateral foraminotomies;  Surgeon: Allyson Sutton M.D.;  Location: SURGERY [facility];  Service: Orthopedics    LUMBAR DECOMPRESSION  1/5/2022    Procedure: DECOMPRESSION, SPINE, LUMBAR;  Surgeon: Allyson Sutton M.D.;  Location: SURGERY [facility];  Service: Orthopedics    FORAMINOTOMY  1/5/2022    Procedure: FORAMINOTOMY, SPINE;  Surgeon: Allyson Sutton M.D.;  Location: SURGERY [facility];  Service: Orthopedics    OTHER      tonsils       PT evaluation performed today; functional performance at today's assessment is as above. At baseline, the patient was independent with mobility.  The patient is limited by the following barriers: Aphasia expressive, Aphasia receptive, Hearing impairment, Impaired balance, Impaired insight/denial of deficits, Impaired functional cognition, Visual impairment    Additional factors influencing patient status and prognosis include: prior level of function, PMH, and the above comorbidities.     The patient is performing well below their baseline level of function and will benefit from an interdisciplinary high intensity rehabilitation program to maximize functional independence, decrease burden of care, and support a safe return home with spousal support.    Plan:    Recommend Physical Therapy  minutes per day 5-7 days per week for 10-12 days for the following treatments: PT Group Therapy, PT Gait Training, PT Self Care/Home Eval, PT Neuro Re-Ed/Balance, PT Aquatic Therapy, PT Therapeutic Activity, PT Evaluation, and PT Community Integration.    Passport items to be completed:  Get in/out of bed safely, in/out of a vehicle, safely use mobility device, walk or wheel around home/community, navigate up and down stairs, show how to get up/down from the ground, ensure home is accessible, demonstrate HEP, complete caregiver training    Goals:  Long-term and short-term goals have been discussed with patient and spouse and  they are in agreement.    Physical Therapy Problems (Active)       Problem: Mobility       Dates: Start:  03/22/25         Goal: STG-Within one week, patient will ambulate 300ft SBA no device with moderate  verbal cueing to scan right       Dates: Start:  03/22/25               Problem: Mobility Transfers       Dates: Start:  03/22/25         Goal: STG-Within one week, patient will perform bed mobility independently       Dates: Start:  03/22/25            Goal: STG-Within one week, patient will transfer bed to chair SBA       Dates: Start:  03/22/25               Problem: PT-Long Term Goals       Dates: Start:  03/22/25         Goal: LTG-By discharge, patient will improve score on Ding balance assessment to >/= 41/56 to indicate low fall risk       Dates: Start:  03/22/25            Goal: LTG-By discharge, patient will ambulate 500ft SPV no device       Dates: Start:  03/22/25            Goal: LTG-By discharge, patient will transfer one surface to another independently       Dates: Start:  03/22/25            Goal: LTG-By discharge, patient will participate in community outing with minimal verbal cueing to scan right in novel environment and no physical assistance required for mobility       Dates: Start:  03/22/25

## 2025-03-22 NOTE — PROGRESS NOTES
NEW ADMIT FALL PREVENTION EDUCATION                                    AND INTERVENTION       Fall Prevention Education Video watched: Yes  Strip Alarm in place: Yes  Alarming seatbelt No   Does patient need a posey bed?: No   Does patient have the right size non-skid sock?: Yes      Admitting RN:    Bhanu Merritt

## 2025-03-23 ENCOUNTER — APPOINTMENT (OUTPATIENT)
Dept: PHYSICAL THERAPY | Facility: REHABILITATION | Age: 75
DRG: 057 | End: 2025-03-23
Attending: PHYSICAL MEDICINE & REHABILITATION
Payer: COMMERCIAL

## 2025-03-23 ENCOUNTER — APPOINTMENT (OUTPATIENT)
Dept: SPEECH THERAPY | Facility: REHABILITATION | Age: 75
DRG: 057 | End: 2025-03-23
Attending: PHYSICAL MEDICINE & REHABILITATION
Payer: COMMERCIAL

## 2025-03-23 ENCOUNTER — APPOINTMENT (OUTPATIENT)
Dept: OCCUPATIONAL THERAPY | Facility: REHABILITATION | Age: 75
DRG: 057 | End: 2025-03-23
Attending: PHYSICAL MEDICINE & REHABILITATION
Payer: COMMERCIAL

## 2025-03-23 PROCEDURE — 97130 THER IVNTJ EA ADDL 15 MIN: CPT

## 2025-03-23 PROCEDURE — 97530 THERAPEUTIC ACTIVITIES: CPT

## 2025-03-23 PROCEDURE — 770010 HCHG ROOM/CARE - REHAB SEMI PRIVAT*

## 2025-03-23 PROCEDURE — 97129 THER IVNTJ 1ST 15 MIN: CPT

## 2025-03-23 PROCEDURE — 700102 HCHG RX REV CODE 250 W/ 637 OVERRIDE(OP): Performed by: PHYSICAL MEDICINE & REHABILITATION

## 2025-03-23 PROCEDURE — 97110 THERAPEUTIC EXERCISES: CPT

## 2025-03-23 PROCEDURE — 700111 HCHG RX REV CODE 636 W/ 250 OVERRIDE (IP): Performed by: PHYSICAL MEDICINE & REHABILITATION

## 2025-03-23 PROCEDURE — A9270 NON-COVERED ITEM OR SERVICE: HCPCS | Performed by: PHYSICAL MEDICINE & REHABILITATION

## 2025-03-23 PROCEDURE — 99232 SBSQ HOSP IP/OBS MODERATE 35: CPT | Performed by: PHYSICAL MEDICINE & REHABILITATION

## 2025-03-23 RX ADMIN — BUTALBITAL, ACETAMINOPHEN, AND CAFFEINE 1 TABLET: 325; 50; 40 TABLET ORAL at 03:09

## 2025-03-23 RX ADMIN — SENNOSIDES AND DOCUSATE SODIUM 2 TABLET: 50; 8.6 TABLET ORAL at 20:58

## 2025-03-23 RX ADMIN — PREDNISONE 50 MG: 5 TABLET ORAL at 09:17

## 2025-03-23 RX ADMIN — LEVETIRACETAM 500 MG: 500 TABLET, FILM COATED ORAL at 09:18

## 2025-03-23 RX ADMIN — ACETAMINOPHEN 1000 MG: 500 TABLET ORAL at 09:18

## 2025-03-23 RX ADMIN — ACETAMINOPHEN 1000 MG: 500 TABLET ORAL at 20:58

## 2025-03-23 RX ADMIN — LEVETIRACETAM 500 MG: 500 TABLET, FILM COATED ORAL at 20:57

## 2025-03-23 RX ADMIN — OMEPRAZOLE 20 MG: 20 CAPSULE, DELAYED RELEASE ORAL at 09:18

## 2025-03-23 RX ADMIN — ACETAMINOPHEN 1000 MG: 500 TABLET ORAL at 15:08

## 2025-03-23 ASSESSMENT — FIBROSIS 4 INDEX: FIB4 SCORE: 1.02

## 2025-03-23 NOTE — PROGRESS NOTES
NURSING DAILY NOTE    Name: Lico Caba   Date of Admission: 3/21/2025   Admitting Diagnosis: Intracranial hemorrhage, spontaneous intraparenchymal, idiopathic, acute ([facility])  Attending Physician: SAVITA GOODMAN M.D.  Allergies: Patient has no known allergies.    Safety  Patient Assist  cga  Patient Precautions  Precautions: Fall Risk, Cognition/Communication, Swallow  Fall Risk: Poor balance, Poor safety, Impulsive, Alarming seat belt, Low vision, Visual spatial neglect/Neglect program  Cognition/Communication: Confused, Expressive Aphasia, Receptive Aphasia, Hearing Aids  Swallow: Therapeutic dining, Modified diet (T-dine for swallow and self feeding d/t r neglect/field cut)  Comments: R visual field cut  Bed Transfer Status  Minimal Assist  Toilet Transfer Status   Minimal Assist  Assistive Devices  Rails, Walker - front wheel  Oxygen  None - Room Air  Diet/Therapeutic Dining  Current Diet Order   Procedures    Diet Order Diet: Level 6 - Soft and Bite Sized; Liquid level: Level 0 - Thin     Pill Administration  whole and one at a time  Agitated Behavioral Scale  21  ABS Level of Severity  No Agitation    Fall Risk  Has the patient had a fall this admission?      Genesis Elizondo Fall Risk Scoring  22, HIGH RISK  Fall Risk Safety Measures  bed alarm, chair alarm, seatbelt alarm, poor balance, and low vision/hearing    Vitals  Temperature: 36.9 °C (98.5 °F)  Temp src: Temporal  Pulse: (!) 57  Respiration: 16  Blood Pressure : 128/70  Blood Pressure MAP (Calculated): 89 MM HG  BP Location: Right, Upper Arm  Patient BP Position: Sitting     Oxygen  Pulse Oximetry: 96 %  O2 (LPM): 0  O2 Delivery Device: None - Room Air    Bowel and Bladder  Last Bowel Movement  03/22/25  Stool Type     Bowel Device     Continent  Bladder: Did not void   Bowel: No movement  Bladder Function  Urine Void (mL):  (bathroom)  Number of Times Voided: 1  Urine Color: Yellow  Genitourinary Assessment   Bladder Assessment (WDL):   Within Defined Limits  Urine Color: Yellow  Bladder Device: Bathroom  Bladder Scan: Post Void  $ Bladder Scan Results (mL): 68    Skin  Dereje Score   19  Sensory Interventions   Bed Types: Standard/Trauma Mattress  Skin Preventative Measures: Pillows in Use for Support / Positioning  Moisture Interventions         Pain  Pain Rating Scale  0 - No Pain  Pain Location     Pain Location Orientation     Pain Interventions   Declines    ADLs    Bathing      Linen Change      Personal Hygiene     Chlorhexidine Bath      Oral Care     Teeth/Dentures     Shave     Nutrition Percentage Eaten  Dinner, Between % Consumed  Environmental Precautions  Treaded Slipper Socks on Patient, Personal Belongings, Wastebasket, Call Bell etc. in Easy Reach, Bed in Low Position  Patient Turns/Positioning  Patient turns self independently side to side without assistance, to offload sacral area  Patient Turns Assistance/Tolerance     Bed Positions  Bed Controls On, Bed Locked  Head of Bed Elevated  Less than 30 degrees      Psychosocial/Neurologic Assessment  Psychosocial Assessment  Psychosocial (WDL):  Within Defined Limits  Neurologic Assessment  Neuro (WDL): Exceptions to WDL  Level of Consciousness: Alert  Orientation Level: Disoriented to time  Cognition: Follows commands, Poor attention/concentration, Appropriate safety awareness  Speech: Slurred  Motor Function/Sensation Assessment: Motor strength  Muscle Strength Right Arm: Good Strength Against Gravity and Moderate Resistance  Muscle Strength Left Arm: Good Strength Against Gravity and Moderate Resistance  Muscle Strength Right Leg: Good Strength Against Gravity and Moderate Resistance  Muscle Strength Left Leg: Good Strength Against Gravity and Moderate Resistance  EENT (WDL):  WDL Except    Cardio/Pulmonary Assessment  Edema      Respiratory Breath Sounds  RUL Breath Sounds: Clear  RML Breath Sounds: Clear  RLL Breath Sounds: Diminished  ZACH Breath Sounds: Clear  LLL Breath  Sounds: Diminished  Cardiac Assessment   Cardiac (WDL):  Within Defined Limits

## 2025-03-23 NOTE — PROGRESS NOTES
NURSING DAILY NOTE    Name: Lico Caba   Date of Admission: 3/21/2025   Admitting Diagnosis: Intracranial hemorrhage, spontaneous intraparenchymal, idiopathic, acute ([facility])  Attending Physician: SAVITA GOODMAN M.D.  Allergies: Patient has no known allergies.    Safety  Patient Assist  cga  Patient Precautions  Precautions: Fall Risk, Cognition/Communication, Swallow  Fall Risk: Poor balance, Poor safety, Impulsive, Alarming seat belt, Low vision, Visual spatial neglect/Neglect program  Cognition/Communication: Confused, Expressive Aphasia, Receptive Aphasia, Hearing Aids  Swallow: Therapeutic dining, Modified diet (T-dine for swallow and self feeding d/t r neglect/field cut)  Comments: R visual field cut  Bed Transfer Status  Minimal Assist  Toilet Transfer Status   Minimal Assist  Assistive Devices  Rails, Walker - front wheel  Oxygen  None - Room Air  Diet/Therapeutic Dining  Current Diet Order   Procedures    Diet Order Diet: Level 6 - Soft and Bite Sized; Liquid level: Level 0 - Thin     Pill Administration  whole  Agitated Behavioral Scale  17  ABS Level of Severity  No Agitation    Fall Risk  Has the patient had a fall this admission?      Genesis Elizondo Fall Risk Scoring  22, HIGH RISK  Fall Risk Safety Measures  bed alarm, chair alarm, seatbelt alarm, poor balance, and low vision/hearing    Vitals  Temperature: 36.9 °C (98.5 °F)  Temp src: Temporal  Pulse: (!) 57  Respiration: 16  Blood Pressure : 128/70  Blood Pressure MAP (Calculated): 89 MM HG  BP Location: Right, Upper Arm  Patient BP Position: Sitting     Oxygen  Pulse Oximetry: 96 %  O2 (LPM): 0  O2 Delivery Device: None - Room Air    Bowel and Bladder  Last Bowel Movement  03/22/25  Stool Type     Bowel Device     Continent  Bladder: Did not void   Bowel: No movement  Bladder Function  Urine Void (mL): 300 ml  Number of Times Voided: 1  Urine Color: Yellow  Genitourinary Assessment   Bladder Assessment (WDL):  Within Defined  Limits  Urine Color: Yellow  Bladder Device: Bathroom  Bladder Scan: Post Void  $ Bladder Scan Results (mL): 34    Skin  Dereje Score   19  Sensory Interventions   Bed Types: Standard/Trauma Mattress with Overlay  Skin Preventative Measures: Pillows in Use for Support / Positioning  Moisture Interventions         Pain  Pain Rating Scale  0 - No Pain  Pain Location     Pain Location Orientation     Pain Interventions   Declines    ADLs    Bathing   Patient Refused Bathing  Linen Change   Partial  Personal Hygiene     Chlorhexidine Bath      Oral Care     Teeth/Dentures     Shave     Nutrition Percentage Eaten  Dinner, Between % Consumed  Environmental Precautions  Treaded Slipper Socks on Patient, Personal Belongings, Wastebasket, Call Bell etc. in Easy Reach, Bed in Low Position  Patient Turns/Positioning  Patient turns self independently side to side without assistance, to offload sacral area  Patient Turns Assistance/Tolerance     Bed Positions  Bed Controls On, Bed Locked  Head of Bed Elevated  Greater or equal to 30 degrees      Psychosocial/Neurologic Assessment  Psychosocial Assessment  Psychosocial (WDL):  Within Defined Limits  Neurologic Assessment  Neuro (WDL): Exceptions to WDL  Level of Consciousness: Alert  Orientation Level: Disoriented to time  Cognition: Follows commands, Poor attention/concentration, Appropriate safety awareness  Speech: Slurred  Motor Function/Sensation Assessment: Motor strength  Muscle Strength Right Arm: Good Strength Against Gravity and Moderate Resistance  Muscle Strength Left Arm: Good Strength Against Gravity and Moderate Resistance  Muscle Strength Right Leg: Good Strength Against Gravity and Moderate Resistance  Muscle Strength Left Leg: Good Strength Against Gravity and Moderate Resistance  EENT (WDL):  WDL Except    Cardio/Pulmonary Assessment  Edema      Respiratory Breath Sounds  RUL Breath Sounds: Clear  RML Breath Sounds: Clear  RLL Breath Sounds:  Diminished  ZACH Breath Sounds: Clear  LLL Breath Sounds: Diminished  Cardiac Assessment   Cardiac (WDL):  Within Defined Limits

## 2025-03-23 NOTE — THERAPY
Occupational Therapy  Daily Treatment     Patient Name:  Lico Caba  Age:  74 y.o., Sex:  male  Medical Record #:  [id number]  Today's Date:  3/23/2025    Precautions  Precautions: Fall Risk, Cognition/Communication  Fall Risk: Poor balance, Poor safety, Impulsive, Alarming seat belt, Low vision, Visual spatial neglect/Neglect program  Cognition/Communication: Confused, Expressive Aphasia, Receptive Aphasia, Hearing Aids  Swallow: Therapeutic dining (T-dine for self feeding d/t R neglect/field cut)  Comments: R visual field cut    Subjective: Pt seated in w/c upon arrival, pleasant and cooperative, agreeable to therapy. Family present throughout session       Objective:    03/23/25 1231   OT Charge Group   OT Therapy Activity (Units) 3   OT Therapeutic Exercise (Units) 1   OT Total Time Spent   OT Individual Total Time Spent (Mins) 60   Outcome Measures   Outcome Measures Utilized 9 Hole Peg Test   9 Hole Peg Test   Right Hand (seconds) 61   Left Hand (seconds) 39   Interdisciplinary Plan of Care Collaboration   Patient Position at End of Therapy Seated;Call Light within Reach;Tray Table within Reach;Self Releasing Lap Belt Applied;Chair Alarm On;Family / Friend in Room     Therapeutic Activities  Purpose: to improve performance and function of daily activities and to increase safety with activities of daily life and mobility related to activities of daily life  Interventions:   standing at mat, targeting R visual scanning and cognition (aphasia, attention, sequencing, working memory) - large alphabet cards scattered. Put cards in alphabetical order and say letter/word. Pt required Mod-Max A for sequencing, locating cards on both L and R sides (more on R side initially), Min A for verbal expression  Seated at table, targeting automatic speech - reading 1-word from cards, choosing correct word from cards when given 2 choices, Min A overall    Therapeutic Exercise  Purpose: to improve strength, to improve  ROM/flexibility, and to improve functional endurance  Interventions:   Nustep: Resistance Level 4 and Time 10 min, 0 RB  Standing BUE therex using 4lb dumbbell with focus on counting, motor planning, sequencing: shoulder press, bicep curls, punches x 10 each. Mod A for motor planning, Max cues to initiate counting    Assessment:    Pt demo global inattention and R visual field cut deficits, functional performance was improved slightly following cues. Cues to slow pace during L to R visual scanning as he tended to skip right over items, cues to attend to the R side, cues to redirect attention - he tended to perseverate on particular words, singing, jumping ahead onto the next step. Pt will continue to benefit from ongoing training for overall safety when moving in his environment during self-care tasks    Strengths: Independent prior level of function, Making steady progress towards goals, Motivated for self care and independence, Pleasant and cooperative, Supportive family, Willingly participates in therapeutic activities  Barriers: Aphasia expressive, Aphasia receptive, Confused, Decreased endurance, Difficulty following instructions, Fatigue, Generalized weakness, Impaired activity tolerance, Impaired balance, Impaired carryover of learning, Impaired insight/denial of deficits, Impaired functional cognition, Impulsive, Limited mobility, Visual impairment    Plan:  R visual scanning and safety awareness, RUE neuro re-ed (ataxia), functional cognition (memory, aphasia, attention, problem solving), overall strength/endurance and standing balance    Lives with spouse, 1 story, 1 CAROL ANN, WIS, tub    Occupational Therapy Goals (Active)       Problem: Bathing       Dates: Start:  03/22/25         Goal: STG-Within one week, patient will bathe at SBA level.       Dates: Start:  03/22/25               Problem: Dressing       Dates: Start:  03/22/25         Goal: STG-Within one week, patient will dress LB at SBA level.        Dates: Start:  03/22/25               Problem: Functional Transfers       Dates: Start:  03/22/25         Goal: STG-Within one week, patient will transfer to toilet at SBA level.       Dates: Start:  03/22/25            Goal: STG-Within one week, patient will transfer to tub/shower at SBA level.       Dates: Start:  03/22/25               Problem: OT Long Term Goals       Dates: Start:  03/22/25         Goal: LTG-By discharge, patient will complete basic self care tasks at SPV-Mod I level.       Dates: Start:  03/22/25            Goal: LTG-By discharge, patient will perform bathroom transfers at SPV-Mod I level.       Dates: Start:  03/22/25               Problem: Toileting       Dates: Start:  03/22/25         Goal: STG-Within one week, patient will complete toileting tasks at SBA level.       Dates: Start:  03/22/25

## 2025-03-23 NOTE — THERAPY
Speech Language Pathology  Daily Treatment     Patient Name:  Lico Caba  Age:  74 y.o., Sex:  male  Medical Record #:  [id number]  Today's Date: 3/23/2025    Precautions  Precautions: (P) Fall Risk, Cognition/Communication  Fall Risk: Poor balance, Poor safety, Impulsive, Alarming seat belt, Low vision, Visual spatial neglect/Neglect program  Cognition/Communication: (P) Confused, Expressive Aphasia, Receptive Aphasia, Hearing Aids  Swallow: (P) Therapeutic dining (T-dine for self feeding d/t R neglect/field cut)  Comments: (P) R visual field cut    Subjective: Pt seen for 2 sessions today to total 90 minutes.     Pt seen for 8:00-9:00- pt received in T-dine with breakfast tray and wife assisting in set up.     Pt seen for 30 minutes from 10:30-11:00 with granddaughter present and participated throughout session.      Objective:    03/23/25 0801   SLP Charge Group   Charges Yes   SLP Cognitive Skill Development First 15 Minutes 1   SLP Cognitive Skill Development Additional 15 Minutes 5   SLP Total Time Spent   SLP Individual Total Time Spent (Mins) 90   Precautions   Precautions Fall Risk;Cognition/Communication   Cognition/Communication Confused;Expressive Aphasia;Receptive Aphasia;Hearing Aids   Swallow Therapeutic dining  (T-dine for self feeding d/t R neglect/field cut)   Comments R visual field cut   Cognition - Detailed   Attention to Task Moderate (3)   Simple Attention Moderate (3)   SCCAN (Scales of Cognitive and Communicative Ability for Neurorehabilitation)   Oral Expression - Raw Score 8   Oral Expression - Scale Performance Score 42   Orientation - Raw Score 7   Orientation - Scale Performance Score 58   Memory - Raw Score 4   Memory - Scale Performance Score 21   Speech Comprehension - Raw Score 6   Speech Comprehension - Scale Performance Score 46   Reading Comprehension - Raw Score 5   Reading Comprehension - Scale Performance Score 42   Writing - Raw Score 1   Writing - Scale  Performance Score 14   Attention - Raw Score 3   Attention - Scale Performance Score 19   Problem Solving - Raw Score 6   Problem Solving - Scale Performance Score 26   SCCAN Total Raw Score 35   SCCAN Degree of Severity Severe Impairment   Patient Scheduling Information   Primary or Coverage ST Cog   T-Dine Yes;Comments  (NO ST AT MEALS)       Dysphagia  Purpose:  Trial tray for upgraded textures  Interventions:   REGULAR/THINS      Expressive Language  Purpose: to improve the ability to verbally express thoughts, needs, emotions, and ideas in everyday communication, to provide education and training for caregivers and/or family members, and to utilize compensatory strategies to effectively communicate  Interventions:   Naming  Automatic language  Verbalizing wants/needs  Expressive language deficits characterized by: Perseverations, Agrammatism, Word Finding Deficits (Anomia), and Phonemic Paraphasias     Written Language Expression  Purpose: to improve the ability to express information through written communication and to provide education and training for caregivers and/or family members  Interventions:   Dominant hand: Right  Copying single letters  Barriers to Written Language Expression: Visual/perceptual deficit and Impaired attention  Cognition  Purpose: to improve attention and focus  Interventions:   Attention: Simple attention    Visual/Neglect  Purpose: to increase awareness of the affected visual field, enabling individual to better attend to their environment and engage in daily tasks safely and to provide education and training for caregivers and/or family members  Interventions:   Verbal cues  Education to granddaugther- placing objects to right. Anchors. Sitting to the right.  Cognition  Purpose: to improve memory functions (short-term, long term, working, etc.)  Interventions:   Memory: Verbal Short-Term Memory and Type of Memory Targeted - Working Memory and Other: mental manipulation - 33% IND  increasing to 66% given 1 verbal rep.       Assessment:     Pt consumed regular textures and thin liquids. No overt s/sx of aspiration noted. Pt self feeding with min-mod cues to locate desired objects on tray. Pt independently alternating liquids and solids, small bites, and appropriate pacing. Not anticipating further dysphagia needs. Recommend d/c from ST for dysphagia, but remain in T-dine for assist in tray set up and self feeding.     Completed SCCAN with overall SEVERE IMPAIRMENT with severe deficits noted across subtest's excluding orientation (Moderate impairment). Believe orientation and memory subtest's to be affected by word finding deficits. Believe pt to have in tact orientation. Pt would benefit from targeting simple attention, neglect, written and verbal expressive language. Targeted briefly in treatment with education to granddaughter how to practice in room as well. Neglect, simple attention, and written expression targeted through single letter copying with min A. Pt with difficulty in naming letter- benefited from cues to automatic speech to achieve correct letter name. Recommended to continue in room.     Strengths: Able to follow instructions, Pleasant and cooperative, Supportive family, Willingly participates in therapeutic activities, Motivated for self care and independence  Barriers: Aphasia expressive, Aphasia receptive, Impaired functional cognition, Visual impairment    Plan:  Continue targeted neglect, recall, simple attention, and verbal/written expression.     Passport items to be completed:  Express basic needs, understand food/liquid recommendations, consistently follow swallow precautions, manage finances, manage medications, arrive to therapy appointments on time, complete daily memory log entries, solve problems related to safety situations, review education related to hospitalization, complete caregiver training     Speech Therapy Problems (Active)       Problem: Expression STGs        Dates: Start:  03/22/25         Goal: STG-Within one week, patient will       Dates: Start:  03/22/25       Description: 1) Individualized goal:  describe selected vocabulary by semantic features with 80% acc and MIN A.   2) Interventions:  SLP Speech Language Treatment, SLP Self Care / ADL Training , and SLP Group Treatment                Problem: Memory STGs       Dates: Start:  03/22/25         Goal: STG-Within one week, patient will       Dates: Start:  03/22/25       Description: 1) Individualized goal:  complete working memory tasks w/ 80% accuracy and MIN A.   2) Interventions:  SLP Self Care / ADL Training , SLP Cognitive Skill Development, and SLP Group Treatment                Problem: Problem Solving STGs       Dates: Start:  03/22/25         Goal: STG-Within one week, patient will       Dates: Start:  03/22/25       Description: 1) Individualized goal:  complete SCCAN w/ further goals developed as appropriate.   2) Interventions:  SLP Cognitive Skill Development             Goal: STG-Within one week, patient will       Dates: Start:  03/22/25       Description: 1) Individualized goal:  complete right scanning/neglect tasks w/ MIN A and 80% acc.   2) Interventions:  SLP Self Care / ADL Training , SLP Cognitive Skill Development, and SLP Group Treatment                Problem: Social Interaction STGs       Dates: Start:  03/22/25         Goal: STG-Within one week, patient will       Dates: Start:  03/22/25               Problem: Speech/Swallowing LTGs       Dates: Start:  03/22/25         Goal: LTG-By discharge, patient will safely swallow       Dates: Start:  03/22/25       Description: 1) Individualized goal:  regular textures and thin liquids implementing safe swallow strategies in >90% of opportunities with MOD I for a safe D/C to PLOF.   2) Interventions:  SLP Swallowing Dysfunction Treatment, SLP Self Care / ADL Training , and SLP Group Treatment             Goal: LTG-By discharge, patient will  solve basic problems       Dates: Start:  03/22/25       Description: 1) Individualized goal:  w/ 80% acc and MOD I for a safe D/C to PLOF.   2) Interventions:  SLP Speech Language Treatment, SLP Self Care / ADL Training , SLP Cognitive Skill Development, and SLP Group Treatment                Problem: Swallowing STGs       Dates: Start:  03/22/25         Goal: STG-Within one week, patient will       Dates: Start:  03/22/25

## 2025-03-23 NOTE — CARE PLAN
Problem: Knowledge Deficit - Standard  Goal: Patient and family/care givers will demonstrate understanding of plan of care, disease process/condition, diagnostic tests and medications  Outcome: Progressing   Pt education given regarding plan of care with emphasis on adequate hydration, pt shows poor understanding, will continue to reinforce education and continue to monitor.         Problem: Fall Risk - Rehab  Goal: Patient will remain free from falls  Outcome: Progressing   Pt education given regarding fall precautions AND safety measures, pt shows poor understanding, has attempted to self transfer this shift, all fall precautions in place, will continue to reinforce education and continue to monitor.

## 2025-03-23 NOTE — CARE PLAN
"The patient is Stable - Low risk of patient condition declining or worsening    Shift Goals  Clinical Goals: Comfort and safety  Patient Goals: Comfort    Progress made toward(s) clinical / shift goals:    Problem: Knowledge Deficit - Standard  Goal: Patient and family/care givers will demonstrate understanding of plan of care, disease process/condition, diagnostic tests and medications  Outcome: Progressing     Problem: Fall Risk - Rehab  Goal: Patient will remain free from falls  Outcome: Progressing  Note: Genesis Elizondo Fall risk Assessment Score: 22    High fall risk Interventions   - Bed and strip alarm   - Yellow sign by the door   - Yellow wrist band \"Fall risk\"  - Room near to the nurse station  - Do not leave patient unattended in the bathroom  - Fall risk education provided     Problem: Discharge Barriers/Planning  Goal: Patient's continuum of care needs are met  Outcome: Progressing     Problem: Psychosocial  Goal: Patient's level of anxiety will decrease  Outcome: Progressing     Problem: Risk for Aspiration  Goal: Patient's risk for aspiration will be absent or decrease  Outcome: Progressing     Problem: Bowel Elimination  Goal: Patient will participate in bowel management program  Outcome: Progressing       Patient is not progressing towards the following goals:      "

## 2025-03-23 NOTE — CARE PLAN
"  Problem: Fall Risk - Rehab  Goal: Patient will remain free from falls  Outcome: Progressing  Note: Genesis Elizondo Fall risk Assessment Score: 22    High fall risk Interventions   - Alarming seatbelt  - Bed and strip alarm   - Yellow sign by the door   - Yellow wrist band \"Fall risk\"  - Room near to the nurse station  - Do not leave patient unattended in the bathroom  - Fall risk education provided      Problem: Psychosocial  Goal: Patient's level of anxiety will decrease  Outcome: Progressing  Note: Pt impulsive , reoriented to place and situation , safety measures enforced , cont monitored.     Problem: Pain - Standard  Goal: Alleviation of pain or a reduction in pain to the patient’s comfort goal  Outcome: Progressing  Note: Assessed for pain and discomfort , denies pain , situated in bed, needs anticipated and attended.   The patient is Stable - Low risk of patient condition declining or worsening    Shift Goals  Clinical Goals: Comfort and safety  Patient Goals: Comfort    Progress made toward(s) clinical / shift goals:  Pt free from fall and injury.    "

## 2025-03-23 NOTE — PROGRESS NOTES
Physical Medicine & Rehabilitation Progress Note  Encounter Date: 3/22/2025    Chief Complaint:   Stroke    Interval Events (Subjective):  No acute events overnight.  Elevated leukocytosis however this is likely secondary to steroids    Objective:  VITAL SIGNS: VITAL SIGNS:   Vitals:    25 1527 25 0432 25 1004 25 1524   BP: 136/62 (!) 147/86 123/64 128/70   Pulse: 66 68 67 (!) 57   Resp: 15 18 16 16   Temp: 36.4 °C (97.5 °F) 36.4 °C (97.5 °F) 36.8 °C (98.2 °F) 36.9 °C (98.5 °F)   TempSrc: Temporal Temporal Temporal Temporal   SpO2: 97% 97% 94% 96%   Weight:             Gen: NAD  Psych: Mood and affect appropriate  CV: RRR, no edema  Resp: CTAB, no upper airway sounds  Abd: NTND  Neuro: Alert and oriented x 3.  Right hemiplegia    Laboratory Values:  Recent Results (from the past 72 hours)   EKG    Collection Time: 25  1:38 PM   Result Value Ref Range    Report       [facility]    Test Date:  2025  Pt Name:    SHABBIR MIRANDA           Department: [facility]  MRN:        [id number]                      Room:       [facility]  Gender:     Male                         Technician: DIETER  :        [id number]                   Requested By:SAVITA GOODMAN  Order #:    [id number]                    Reading MD: Erick Weir MD    Measurements  Intervals                                Axis  Rate:       63                           P:          55  SD:         178                          QRS:        30  QRSD:       146                          T:          44  QT:         414  QTc:        424    Interpretive Statements  Sinus rhythm  Nonspecific intraventricular conduction delay  Nonspecific ST-T wave changes  Electronically Signed On 2025 13:38:27 PDT by Erick Weir MD     CBC with Differential    Collection Time: 25  5:44 AM   Result Value Ref Range    WBC 12.8 (H) 4.8 - 10.8 K/uL    RBC 4.93 4.70 - 6.10 M/uL    Hemoglobin 15.2 14.0 - 18.0 g/dL    Hematocrit 45.6 42.0 - 52.0 %     MCV 92.5 81.4 - 97.8 fL    MCH 30.8 27.0 - 33.0 pg    MCHC 33.3 32.3 - 36.5 g/dL    RDW 45.8 35.9 - 50.0 fL    Platelet Count 242 164 - 446 K/uL    MPV 9.7 9.0 - 12.9 fL    Neutrophils-Polys 69.20 44.00 - 72.00 %    Lymphocytes 18.60 (L) 22.00 - 41.00 %    Monocytes 11.00 0.00 - 13.40 %    Eosinophils 0.20 0.00 - 6.90 %    Basophils 0.20 0.00 - 1.80 %    Immature Granulocytes 0.80 0.00 - 0.90 %    Nucleated RBC 0.00 0.00 - 0.20 /100 WBC    Neutrophils (Absolute) 8.85 (H) 1.82 - 7.42 K/uL    Lymphs (Absolute) 2.37 1.00 - 4.80 K/uL    Monos (Absolute) 1.40 (H) 0.00 - 0.85 K/uL    Eos (Absolute) 0.02 0.00 - 0.51 K/uL    Baso (Absolute) 0.02 0.00 - 0.12 K/uL    Immature Granulocytes (abs) 0.10 0.00 - 0.11 K/uL    NRBC (Absolute) 0.00 K/uL   Comp Metabolic Panel (CMP)    Collection Time: 03/22/25  5:44 AM   Result Value Ref Range    Sodium 136 135 - 145 mmol/L    Potassium 4.1 3.6 - 5.5 mmol/L    Chloride 103 96 - 112 mmol/L    Co2 21 20 - 33 mmol/L    Anion Gap 12.0 7.0 - 16.0    Glucose 96 65 - 99 mg/dL    Bun 24 (H) 8 - 22 mg/dL    Creatinine 0.82 0.50 - 1.40 mg/dL    Calcium 8.2 (L) 8.5 - 10.5 mg/dL    Correct Calcium 8.5 8.5 - 10.5 mg/dL    AST(SGOT) 33 12 - 45 U/L    ALT(SGPT) 103 (H) 2 - 50 U/L    Alkaline Phosphatase 91 30 - 99 U/L    Total Bilirubin 0.5 0.1 - 1.5 mg/dL    Albumin 3.6 3.2 - 4.9 g/dL    Total Protein 6.1 6.0 - 8.2 g/dL    Globulin 2.5 1.9 - 3.5 g/dL    A-G Ratio 1.4 g/dL   Magnesium    Collection Time: 03/22/25  5:44 AM   Result Value Ref Range    Magnesium 2.5 1.5 - 2.5 mg/dL   Vitamin D, 25-hydroxy (blood)    Collection Time: 03/22/25  5:44 AM   Result Value Ref Range    25-Hydroxy   Vitamin D 25 32 30 - 100 ng/mL   ESTIMATED GFR    Collection Time: 03/22/25  5:44 AM   Result Value Ref Range    GFR (CKD-EPI) 91 >60 mL/min/1.73 m 2       Medications:  Scheduled Medications   Medication Dose Frequency    Pharmacy Consult Request  1 Each PHARMACY TO DOSE    senna-docusate  2 Tablet Q EVENING     omeprazole  20 mg DAILY    acetaminophen  1,000 mg TID    levETIRAcetam  500 mg BID    predniSONE  50 mg DAILY     PRN medications: Respiratory Therapy Consult, hydrALAZINE, senna-docusate **AND** polyethylene glycol/lytes, ondansetron **OR** ondansetron, traZODone, sodium chloride, butalbital/apap/caffeine, oxyCODONE immediate-release    Bowel:  Last Documented BM Date:Last BM: 03/22/25  Last Documented BM Description: Stool Description: Large;Brown;Soft    Bladder:  Last Documented Urine Void (mL): 450 ml  Last Documented Bladder Scan: Post Void  Last Documented Bladder Scan Results (mL): 68    Physical Therapy:  Bed Mobility    Transfers        Mobility     Stairs    Barriers to Discharge Home:      Occupational Therapy:  Grooming     Bathing     UB Dressing     LB Dressing     Toileting     Shower & Transfer     Barriers to Discharge Home:    Diet:  Current Diet Order   Procedures    Diet Order Diet: Level 6 - Soft and Bite Sized; Liquid level: Level 0 - Thin     Medical Decision Making and Plan:  Left hemorrhagic stroke with right hemiplegia  Continue aspirin and statin for secondary stroke prophylaxis  Continue Keppra  Continue comprehensive inpatient rehabilitation    Pain  Continue Tylenol as needed, continue Fioricet as needed.  Continue prednisone from admission    DVT prophylaxis: Chemoprophylaxis contraindicated due to hemorrhagic stroke    GI prophylaxis continue Vielkalosec    CINDY, Rui Oneil M.D., personally performed a complete drug regimen review and no potential clinically significant medication issues were identified.  _____________________________________    Rui Oneil MD  [facility] - Physical Medicine & Rehabilitation     _____________________________________

## 2025-03-24 ENCOUNTER — APPOINTMENT (OUTPATIENT)
Dept: PHYSICAL THERAPY | Facility: REHABILITATION | Age: 75
DRG: 057 | End: 2025-03-24
Attending: PHYSICAL MEDICINE & REHABILITATION
Payer: COMMERCIAL

## 2025-03-24 ENCOUNTER — APPOINTMENT (OUTPATIENT)
Dept: OCCUPATIONAL THERAPY | Facility: REHABILITATION | Age: 75
DRG: 057 | End: 2025-03-24
Attending: PHYSICAL MEDICINE & REHABILITATION
Payer: COMMERCIAL

## 2025-03-24 ENCOUNTER — APPOINTMENT (OUTPATIENT)
Dept: SPEECH THERAPY | Facility: REHABILITATION | Age: 75
DRG: 057 | End: 2025-03-24
Attending: PHYSICAL MEDICINE & REHABILITATION
Payer: COMMERCIAL

## 2025-03-24 LAB — EKG IMPRESSION: NORMAL

## 2025-03-24 PROCEDURE — 97110 THERAPEUTIC EXERCISES: CPT

## 2025-03-24 PROCEDURE — 700102 HCHG RX REV CODE 250 W/ 637 OVERRIDE(OP): Performed by: PHYSICAL MEDICINE & REHABILITATION

## 2025-03-24 PROCEDURE — 97530 THERAPEUTIC ACTIVITIES: CPT

## 2025-03-24 PROCEDURE — 770010 HCHG ROOM/CARE - REHAB SEMI PRIVAT*

## 2025-03-24 PROCEDURE — 99232 SBSQ HOSP IP/OBS MODERATE 35: CPT | Performed by: PHYSICAL MEDICINE & REHABILITATION

## 2025-03-24 PROCEDURE — A9270 NON-COVERED ITEM OR SERVICE: HCPCS | Performed by: PHYSICAL MEDICINE & REHABILITATION

## 2025-03-24 PROCEDURE — 92526 ORAL FUNCTION THERAPY: CPT

## 2025-03-24 PROCEDURE — 97116 GAIT TRAINING THERAPY: CPT

## 2025-03-24 PROCEDURE — 93005 ELECTROCARDIOGRAM TRACING: CPT | Mod: TC | Performed by: PHYSICAL MEDICINE & REHABILITATION

## 2025-03-24 PROCEDURE — 93010 ELECTROCARDIOGRAM REPORT: CPT | Performed by: INTERNAL MEDICINE

## 2025-03-24 PROCEDURE — 700111 HCHG RX REV CODE 636 W/ 250 OVERRIDE (IP): Performed by: PHYSICAL MEDICINE & REHABILITATION

## 2025-03-24 RX ADMIN — ACETAMINOPHEN 1000 MG: 500 TABLET ORAL at 20:38

## 2025-03-24 RX ADMIN — ACETAMINOPHEN 1000 MG: 500 TABLET ORAL at 10:03

## 2025-03-24 RX ADMIN — POLYETHYLENE GLYCOL 3350 1 PACKET: 17 POWDER, FOR SOLUTION ORAL at 18:40

## 2025-03-24 RX ADMIN — OMEPRAZOLE 20 MG: 20 CAPSULE, DELAYED RELEASE ORAL at 10:03

## 2025-03-24 RX ADMIN — LEVETIRACETAM 500 MG: 500 TABLET, FILM COATED ORAL at 20:38

## 2025-03-24 RX ADMIN — SENNOSIDES AND DOCUSATE SODIUM 2 TABLET: 50; 8.6 TABLET ORAL at 20:38

## 2025-03-24 RX ADMIN — LEVETIRACETAM 500 MG: 500 TABLET, FILM COATED ORAL at 10:03

## 2025-03-24 RX ADMIN — ACETAMINOPHEN 1000 MG: 500 TABLET ORAL at 15:20

## 2025-03-24 RX ADMIN — PREDNISONE 50 MG: 5 TABLET ORAL at 10:02

## 2025-03-24 NOTE — THERAPY
Occupational Therapy  Daily Treatment     Patient Name:  Lico Caba  Age:  74 y.o., Sex:  male  Medical Record #:  [id number]  Today's Date:  3/24/2025    Precautions  Precautions: Fall Risk, Cognition/Communication  Fall Risk: Poor balance, Poor safety, Impulsive, Alarming seat belt, Low vision, Visual spatial neglect/Neglect program  Cognition/Communication: Confused, Expressive Aphasia, Receptive Aphasia, Hearing Aids  Swallow: Therapeutic dining (T-dine for self feeding d/t R neglect/field cut)  Comments: R visual field cut    Subjective: Pt seated in w/c at t-dine upon arrival, pleasant and cooperative, agreeable to therapy. Reports self-feeding during breakfast was messier today. Son present throughout session      Objective:    03/24/25 0831   OT Charge Group   OT Therapy Activity (Units) 3   OT Therapeutic Exercise (Units) 1   OT Total Time Spent   OT Individual Total Time Spent (Mins) 60   Cognition    Cognition / Consciousness X   Speech/ Communication Word Finding Impairment;Expressive Aphasia;Hard of Hearing;Receptive Aphasia   Level of Consciousness Alert   Ability To Follow Commands 1 Step   Safety Awareness Impaired   New Learning Impaired   Attention Impaired   Sequencing Impaired   Initiation Impaired   ABS (Agitated Behavior Scale)   Agitated Behavior Scale Performed No   Interdisciplinary Plan of Care Collaboration   Patient Position at End of Therapy Seated;Call Light within Reach;Tray Table within Reach;Chair Alarm On;Self Releasing Lap Belt Applied;Family / Friend in Room     Therapeutic Activities  Purpose: to improve performance and function of daily activities and to increase safety with activities of daily life and mobility related to activities of daily life  Interventions:  Standing at table: PVC pipe tree - x2 basic designs, with focus on visuospatial constructive cognition, visual memory, and word finding. Pt required Max guidance to sequence, maintain attention, problem  solve.  Seated R-handwriting at whiteboard with focus on visual scanning, motor planning. No cues for locating items on both L and R sides, handwriting legibility - poor, x2 spelling errors with his last name    Therapeutic Exercise  Purpose: to improve strength, to improve ROM/flexibility, and to improve functional endurance  Interventions:   BUE motomed: level 6, 5 min, 0 RB, .88 mile  Standing BUE therex using 4lb dumbbell with focus on counting, motor planning, sequencing: shoulder press, bicep curls, punches x 10 each (same exercises as yesterday). Min A for motor planning, Mod cues to initiate counting     Assessment:    Pt highly motivated and completes to the best of his abilities. Continues to require assist for basic tasks especially if it is visually complex. Less impulsive behavior demonstrated during this session    Strengths: Independent prior level of function, Making steady progress towards goals, Motivated for self care and independence, Pleasant and cooperative, Supportive family, Willingly participates in therapeutic activities  Barriers: Aphasia expressive, Aphasia receptive, Confused, Decreased endurance, Difficulty following instructions, Fatigue, Generalized weakness, Impaired activity tolerance, Impaired balance, Impaired carryover of learning, Impaired insight/denial of deficits, Impaired functional cognition, Impulsive, Limited mobility, Visual impairment    Plan:  R visual scanning and safety awareness, RUE neuro re-ed (ataxia), functional cognition (memory, aphasia, apraxia, attention, problem solving), overall strength/endurance and standing balance     Lives with spouse, 1 story, 1 CAROL ANN, WIS, tub    Occupational Therapy Goals (Active)       Problem: Bathing       Dates: Start:  03/22/25         Goal: STG-Within one week, patient will bathe at SBA level.       Dates: Start:  03/22/25               Problem: Dressing       Dates: Start:  03/22/25         Goal: STG-Within one week, patient  will dress LB at SBA level.       Dates: Start:  03/22/25               Problem: Functional Transfers       Dates: Start:  03/22/25         Goal: STG-Within one week, patient will transfer to toilet at SBA level.       Dates: Start:  03/22/25            Goal: STG-Within one week, patient will transfer to tub/shower at SBA level.       Dates: Start:  03/22/25               Problem: OT Long Term Goals       Dates: Start:  03/22/25         Goal: LTG-By discharge, patient will complete basic self care tasks at SPV-Mod I level.       Dates: Start:  03/22/25            Goal: LTG-By discharge, patient will perform bathroom transfers at SPV-Mod I level.       Dates: Start:  03/22/25               Problem: Toileting       Dates: Start:  03/22/25         Goal: STG-Within one week, patient will complete toileting tasks at SBA level.       Dates: Start:  03/22/25

## 2025-03-24 NOTE — FLOWSHEET NOTE
03/24/25 1525   Patient Events   Interdisciplinary Rounds Attendance at Rounds (30 Min)  (EKG)     EKG done and delivered to Dr. Klein

## 2025-03-24 NOTE — CARE PLAN
"The patient is Stable - Low risk of patient condition declining or worsening    Shift Goals  Clinical Goals: Comfort and safety  Patient Goals: Comfort    Progress made toward(s) clinical / shift goals:    Problem: Knowledge Deficit - Standard  Goal: Patient and family/care givers will demonstrate understanding of plan of care, disease process/condition, diagnostic tests and medications  Outcome: Progressing     Problem: Fall Risk - Rehab  Goal: Patient will remain free from falls  Outcome: Progressing  Note: Genesis Elizondo Fall risk Assessment Score: 22    High fall risk Interventions   - Alarming seatbelt  - Bed and strip alarm   - Yellow sign by the door   - Yellow wrist band \"Fall risk\"  - Room near to the nurse station  - Do not leave patient unattended in the bathroom  - Fall risk education provided     Problem: Discharge Barriers/Planning  Goal: Patient's continuum of care needs are met  Outcome: Progressing     Problem: Psychosocial  Goal: Patient's level of anxiety will decrease  Outcome: Progressing     Problem: Risk for Aspiration  Goal: Patient's risk for aspiration will be absent or decrease  Outcome: Progressing     Problem: Bladder / Voiding  Goal: Patient will establish and maintain regular urinary output  Outcome: Progressing       Patient is not progressing towards the following goals:      "

## 2025-03-24 NOTE — PROGRESS NOTES
"  Physical Medicine & Rehabilitation Progress Note    Encounter Date: 3/24/2025    Chief Complaint: Weakness    Interval Events (Subjective):  Seen in bed. Following simple commands. EKG today to look for qtc prolongation. Participating in therapy    Objective:  VITAL SIGNS: /71   Pulse (!) 52   Temp 36.8 °C (98.2 °F) (Temporal)   Resp 18   Ht 1.854 m (6' 1\")   Wt 81.5 kg (179 lb 10.8 oz)   SpO2 97%   BMI 23.71 kg/m²   Gen: No acute distress, well developed well nourished adult  HEENT: Normal Cephalic Atraumatic, Normal conjunctiva.   CV: warm extremities, well perfused, no edema  Resp: symmetric chest rise, breathing comfortably on room air  Abd: Soft, Non distended  Extremities: normal bulk, no atrophy  Skin: no visible rashes or lesions.   Neuro: alert, awake  Psych: Mood and affect appropriate and congruent    Laboratory Values:  Recent Results (from the past 72 hours)   EKG    Collection Time: 25  1:38 PM   Result Value Ref Range    Report       [facility]    Test Date:  2025  Pt Name:    SHABBIR MIRANDA           Department: [facility]  MRN:        [id number]                      Room:       [facility]  Gender:     Male                         Technician: RACHELSHERLY  :        1950                   Requested By:SAVITA GOODMAN  Order #:    [id number]                    Reading MD: Erick Weir MD    Measurements  Intervals                                Axis  Rate:       63                           P:          55  WI:         178                          QRS:        30  QRSD:       146                          T:          44  QT:         414  QTc:        424    Interpretive Statements  Sinus rhythm  Nonspecific intraventricular conduction delay  Nonspecific ST-T wave changes  Electronically Signed On 2025 13:38:27 PDT by Ercik Weir MD     CBC with Differential    Collection Time: 25  5:44 AM   Result Value Ref Range    WBC 12.8 (H) 4.8 - 10.8 K/uL    RBC 4.93 4.70 - 6.10 " M/uL    Hemoglobin 15.2 14.0 - 18.0 g/dL    Hematocrit 45.6 42.0 - 52.0 %    MCV 92.5 81.4 - 97.8 fL    MCH 30.8 27.0 - 33.0 pg    MCHC 33.3 32.3 - 36.5 g/dL    RDW 45.8 35.9 - 50.0 fL    Platelet Count 242 164 - 446 K/uL    MPV 9.7 9.0 - 12.9 fL    Neutrophils-Polys 69.20 44.00 - 72.00 %    Lymphocytes 18.60 (L) 22.00 - 41.00 %    Monocytes 11.00 0.00 - 13.40 %    Eosinophils 0.20 0.00 - 6.90 %    Basophils 0.20 0.00 - 1.80 %    Immature Granulocytes 0.80 0.00 - 0.90 %    Nucleated RBC 0.00 0.00 - 0.20 /100 WBC    Neutrophils (Absolute) 8.85 (H) 1.82 - 7.42 K/uL    Lymphs (Absolute) 2.37 1.00 - 4.80 K/uL    Monos (Absolute) 1.40 (H) 0.00 - 0.85 K/uL    Eos (Absolute) 0.02 0.00 - 0.51 K/uL    Baso (Absolute) 0.02 0.00 - 0.12 K/uL    Immature Granulocytes (abs) 0.10 0.00 - 0.11 K/uL    NRBC (Absolute) 0.00 K/uL   Comp Metabolic Panel (CMP)    Collection Time: 03/22/25  5:44 AM   Result Value Ref Range    Sodium 136 135 - 145 mmol/L    Potassium 4.1 3.6 - 5.5 mmol/L    Chloride 103 96 - 112 mmol/L    Co2 21 20 - 33 mmol/L    Anion Gap 12.0 7.0 - 16.0    Glucose 96 65 - 99 mg/dL    Bun 24 (H) 8 - 22 mg/dL    Creatinine 0.82 0.50 - 1.40 mg/dL    Calcium 8.2 (L) 8.5 - 10.5 mg/dL    Correct Calcium 8.5 8.5 - 10.5 mg/dL    AST(SGOT) 33 12 - 45 U/L    ALT(SGPT) 103 (H) 2 - 50 U/L    Alkaline Phosphatase 91 30 - 99 U/L    Total Bilirubin 0.5 0.1 - 1.5 mg/dL    Albumin 3.6 3.2 - 4.9 g/dL    Total Protein 6.1 6.0 - 8.2 g/dL    Globulin 2.5 1.9 - 3.5 g/dL    A-G Ratio 1.4 g/dL   Magnesium    Collection Time: 03/22/25  5:44 AM   Result Value Ref Range    Magnesium 2.5 1.5 - 2.5 mg/dL   Vitamin D, 25-hydroxy (blood)    Collection Time: 03/22/25  5:44 AM   Result Value Ref Range    25-Hydroxy   Vitamin D 25 32 30 - 100 ng/mL   ESTIMATED GFR    Collection Time: 03/22/25  5:44 AM   Result Value Ref Range    GFR (CKD-EPI) 91 >60 mL/min/1.73 m 2       Medications:  Scheduled Medications   Medication Dose Frequency    Pharmacy Consult  Request  1 Each PHARMACY TO DOSE    senna-docusate  2 Tablet Q EVENING    omeprazole  20 mg DAILY    acetaminophen  1,000 mg TID    levETIRAcetam  500 mg BID     PRN medications: Respiratory Therapy Consult, hydrALAZINE, senna-docusate **AND** polyethylene glycol/lytes, ondansetron **OR** ondansetron, traZODone, sodium chloride, butalbital/apap/caffeine, oxyCODONE immediate-release    Diet:  Current Diet Order   Procedures    Diet Order Diet: Regular (T-dine, meats cut up)       Medical Decision Making and Plan:  CVA:  - Patient with L hemorraghic stroke side/type of stroke on 3/16 date. Etiology likely hypertensive. Received no intervention  - Continue ASA and statin for secondary stroke prophylaxis.  -on keppra 500mg BID  -EKG today     Cognitive Communicative deficits:  - Patient with significant cognitive deficits due to aphasia  - Environmental modifications to decrease excessive stimulation including turning off the lights  - Consider starting neurostimulants such Nuvigil, amantadine or methylphenidate  - SLP to evaluate and treat cognitive deficits.   -Neuropsych will follow and perform testing if/when appropriate.  -EKG today, consider aricept     Hemiparesis:  - Patient with R dom hemiparesis  - consider starting SSRI per FLAME trial to facilitate post-stroke motor recovery  - Continue aggressive therapies with PT and OT to strengthen and optimize function.     Spasticity:  - Patient at risk of tone in the affected limbs/muscles  - PT and OT to initiate aggressive stretching, ROM exercises, bracing, splinting, casting and positioning as appropriate.  - If the tone fails to improve with the above conservative measures, consider further intervention with botulinum toxin or phenol injections.     Dysphagia/Nutrition:  - Patient currently on dysphagia DIET.  - Continue therapies with SLP. SLP to perform swallow evaluation and provide oral motor exercises if necessary.        Neurogenic bladder:  - Timed voids  with PVR q4H x3. If PVR > 400mL or if patient is unable to void, straight cath patient.     Neurogenic bowel:  -  Colace, Senna BID on admission  - Goal of 1BM/day.  -Last BM: 03/20/25 (Per report)        Circadian Rhythm disorder:   -Trazadone 50mg PRN for restlessness after 10pm  Recommend lights on during the day/off at night, minimize nighttime interruptions as able.     Mood  - at risk of adjustment disorder, depression, and anxiety due to functional decline     ID:  - at risk for Urinary tract infection     Skin/Wounds:  - Pressure relief q2h while in bed. Close monitoring for signs of breakdown     Pain:  - Neuroceptic - On Tylenol prn, fiorecet, prednisone x 4 days. Cmp tomorrow. Continue tylenol and fiorecet     DVT prophylaxis:  Patient is contraindicated due to bleed.      GI prophylaxis:  On Prilosec 20mg daily         -Follow-up Neurology, PCP    ____________________________________    Vivek Klein MD  Physical Medicine & Rehabilitation   Brain Injury Medicine   ____________________________________

## 2025-03-24 NOTE — PROGRESS NOTES
NURSING DAILY NOTE    Name: Lico Caba   Date of Admission: 3/21/2025   Admitting Diagnosis: Intracranial hemorrhage, spontaneous intraparenchymal, idiopathic, acute ([facility])  Attending Physician: SAVITA GOODMAN M.D.  Allergies: Patient has no known allergies.    Safety  Patient Assist  cga  Patient Precautions  Precautions: Fall Risk, Cognition/Communication  Fall Risk: Poor balance, Poor safety, Impulsive, Alarming seat belt, Low vision, Visual spatial neglect/Neglect program  Cognition/Communication: Confused, Expressive Aphasia, Receptive Aphasia, Hearing Aids  Swallow: Therapeutic dining (T-dine for self feeding d/t R neglect/field cut)  Comments: R visual field cut  Bed Transfer Status  Minimal Assist  Toilet Transfer Status   Minimal Assist  Assistive Devices  Rails, Walker - front wheel  Oxygen  None - Room Air  Diet/Therapeutic Dining  Current Diet Order   Procedures    Diet Order Diet: Regular (T-dine)     Pill Administration  whole  Agitated Behavioral Scale  17  ABS Level of Severity  No Agitation    Fall Risk  Has the patient had a fall this admission?      Genesis Elizondo Fall Risk Scoring  22, HIGH RISK  Fall Risk Safety Measures  bed alarm, chair alarm, seatbelt alarm, and low vision/hearing    Vitals  Temperature: 36.3 °C (97.4 °F)  Temp src: Temporal  Pulse: 60  Respiration: 18  Blood Pressure : 124/65  Blood Pressure MAP (Calculated): 85 MM HG  BP Location: Left, Upper Arm  Patient BP Position: Sitting     Oxygen  Pulse Oximetry: 96 %  O2 (LPM): 0  O2 Delivery Device: None - Room Air    Bowel and Bladder  Last Bowel Movement  03/22/25  Stool Type     Bowel Device     Continent  Bladder: Did not void   Bowel: No movement  Bladder Function  Urine Void (mL): 300 ml  Number of Times Voided: 1  Urine Color: Yellow  Genitourinary Assessment   Bladder Assessment (WDL):  Within Defined Limits  Urine Color: Yellow  Bladder Device: Bathroom  Bladder Scan: Post Void  $ Bladder Scan Results (mL):  34    Skin  Dereje Score   19  Sensory Interventions   Bed Types: Standard/Trauma Mattress  Skin Preventative Measures: Pillows in Use for Support / Positioning  Moisture Interventions         Pain  Pain Rating Scale  3 - Sometimes distracts me  Pain Location  Head  Pain Location Orientation  Inner  Pain Interventions   Repositioned, Cold Pack    ADLs    Bathing   Patient Refused Bathing  Linen Change   Partial  Personal Hygiene     Chlorhexidine Bath      Oral Care     Teeth/Dentures     Shave     Nutrition Percentage Eaten  Lunch, Between % Consumed (75%)  Environmental Precautions  Treaded Slipper Socks on Patient, Personal Belongings, Wastebasket, Call Bell etc. in Easy Reach, Bed in Low Position  Patient Turns/Positioning  Sitting up in wheelchair  Patient Turns Assistance/Tolerance     Bed Positions  Bed Controls On, Bed Locked  Head of Bed Elevated  Greater or equal to 30 degrees      Psychosocial/Neurologic Assessment  Psychosocial Assessment  Psychosocial (WDL):  WDL Except  Patient Behaviors: Forgetful  Family Behaviors: Anxious  Neurologic Assessment  Neuro (WDL): Exceptions to WDL  Level of Consciousness: Alert  Orientation Level: Disoriented to time  Cognition: Follows commands, Poor attention/concentration, Appropriate safety awareness  Speech: Slurred  Motor Function/Sensation Assessment: Motor strength  Muscle Strength Right Arm: Good Strength Against Gravity and Moderate Resistance  Muscle Strength Left Arm: Good Strength Against Gravity and Moderate Resistance  Muscle Strength Right Leg: Good Strength Against Gravity and Moderate Resistance  Muscle Strength Left Leg: Good Strength Against Gravity and Moderate Resistance  EENT (WDL):  WDL Except    Cardio/Pulmonary Assessment  Edema      Respiratory Breath Sounds  RUL Breath Sounds: Clear  RML Breath Sounds: Clear  RLL Breath Sounds: Diminished  ZACH Breath Sounds: Clear  LLL Breath Sounds: Diminished  Cardiac Assessment   Cardiac (WDL):   Within Defined Limits

## 2025-03-24 NOTE — CARE PLAN
Problem: Problem Solving STGs  Goal: STG-Within one week, patient will  Description: 1) Individualized goal:  complete SCCAN w/ further goals developed as appropriate.   2) Interventions:  SLP Cognitive Skill Development      Outcome: Met  Note: SCCAN completed 3/23/25.  Patient achieved a total raw score of 35/94 characteristic of a severe cognitive linguistic impairment.  Patient's moderately impaired expressive anomic aphasia, and mild impairment of simple receptive language skills, likely impacted results on SCCAN.  Goal: STG-Within one week, patient will  Description: 1) Individualized goal:  complete right scanning/neglect tasks w/ MIN A and 80% acc.   2) Interventions:  SLP Self Care / ADL Training , SLP Cognitive Skill Development, and SLP Group Treatment      Outcome: Not Met  Note: Patient needs min to mod cues for 80% acc in environ mental scans related to eating.  Continue goal.

## 2025-03-24 NOTE — THERAPY
Speech Language Pathology  Daily Treatment     Patient Name:  Lico Caba  Age:  74 y.o., Sex:  male  Medical Record #:  [id number]  Today's Date: 3/24/2025    Precautions  Precautions: Fall Risk, Cognition/Communication  Fall Risk: Poor balance, Poor safety, Impulsive, Alarming seat belt, Low vision, Visual spatial neglect/Neglect program  Cognition/Communication: Confused, Expressive Aphasia, Receptive Aphasia, Hearing Aids  Swallow: Therapeutic dining (T-dine for self feeding d/t R neglect/field cut)  Comments: R visual field cut    Subjective: Patient agreeable to therapy.  Spouse accompanied him to session.     Objective:    03/24/25 1031   SLP Charge Group   SLP Swallowing Dysfunction Treatment Swallowing Dysfunction Treatment   SLP Total Time Spent   SLP Individual Total Time Spent (Mins) 90   Dysphagia    Diet / Liquid Recommendation Thin (0);Regular (7)  (Meats cut up bite sized)   Medication Administration  Whole with Liquid Wash   Nutritional Liquid Intake Rating Scale Non thickened beverages   Nutritional Food Intake Rating Scale Total oral diet with multiple consistencies but requiring special preparation or compensations  (meats cut up)   Therapy Interventions Dysphagia Therapy By Speech Language Pathologist;Other (Comments)   Skilled Intervention Verbal Cueing;External Aids;Compensatory Strategies   Outcome Measures   Outcome Measures Utilized WAB-Bedside   WAB - Bedside (Western Aphasia Battery)   Spontaneous Speech: Content  6   Spontaneous Speech: Fluency 5   Auditory Comprehension 10   Sequential Commands 3   Repetition Score  8   Object Naming 4   Bedside Asphasia Score 60   Interdisciplinary Plan of Care Collaboration   IDT Collaboration with  Family / Caregiver   Patient Position at End of Therapy Seated;Family / Friend in Room  (finishing meal in T-dine)   Collaboration Comments Educated patient and spouse on semantic feature analysis and cuing strategies.       Expressive  Language  Purpose: to improve confidence in verbal expression across different environments (i.e. home, work, school, social settings, etc.), to improve the ability to verbally express thoughts, needs, emotions, and ideas in everyday communication, to provide education and training for caregivers and/or family members, and to utilize compensatory strategies to effectively communicate  Interventions:   Naming  Expressive language deficits characterized by: Perseverations and Semantic Paraphasias  Naming pictures 56%, increasing to 80% with phrase completion cue and 100% with first letter/ written word and  model.  Patient also attempted to copy CVC words ( 2 of them) with max cues.  Patient tended to write only the last letter.    Dysphagia    Patient assessed with regular textures.  Patient tolerated (7) regular and (0) thin liquids ( including with straw) with no s/sx of aspiration.  Patient did demonstrate difficulty recognizing how to use utensils at times, attempted to drink his pudding, and displays right visual neglect or field cut with some food items pushed off plate.  Recommend continue with Regular textures and thin liquids.  Meals in t-dine for self feeding.  Discontinue dysphagia intervention.       Assessment:     Completed WAB-Bedside-R.  Patient achieved an Aphasia Score of 60 characteristic of moderate anomic aphasia.   Spontaneous Speech Content 6/10, Spontaneous Speech Fluency 5/10. Auditory Comprehension : Yes/no questions 10/10, Sequential Commands 3/10, Repetition 8/10, and Naming 4/10. Bedside Aphasia Classification Criteria suggests Anomic Aphasia.  Patient displays functional spontaneous automatic speech but struggles significantly with word finding in confrontational responses with many perseverations. Yes no comprehension is functional, but patient struggles with sequential directions, which may be impacted by attempts to repeat the instruction to self and struggling to word find through  the entire sequence.    Patient benefits from sentence completion cues, to improve confrontational naming.  Educated patient and spouse on use of semantic feature description and phrase completion, to help with word finding.  Patient did demonstrate some difficulty generating word from feature description and needed additional carrier phrase to generate the target word.     Patient assessed with regular textures.  Patient tolerated (7) regular and (0) thin liquids ( including with straw) with no s/sx of aspiration.  Patient did demonstrate difficulty recognizing how to use utensils at times, attempted to drink his pudding, and displays right visual neglect or field cut with some food items pushed off plate.  Recommend continue with Regular textures and thin liquids.  Meals in t-dine for self feeding.  Discontinue dysphagia intervention.    Strengths: Able to follow instructions, Pleasant and cooperative, Supportive family, Willingly participates in therapeutic activities, Motivated for self care and independence  Barriers: Aphasia expressive, Aphasia receptive, Impaired functional cognition, Visual impairment    Plan:  Target picture naming/ description, right visual scanning, recall.  Continue T-dine for self feeding, discontinue dysphagia intervention.  Patient on regular diet textures and thin liquids.    Passport items to be completed:  Express basic needs, understand food/liquid recommendations, consistently follow swallow precautions, manage finances, manage medications, arrive to therapy appointments on time, complete daily memory log entries, solve problems related to safety situations, review education related to hospitalization, complete caregiver training     Speech Therapy Problems (Active)       Problem: Expression STGs       Dates: Start:  03/22/25         Goal: STG-Within one week, patient will       Dates: Start:  03/22/25       Description: 1) Individualized goal:  describe selected vocabulary by  semantic features with 80% acc and MIN A.   2) Interventions:  SLP Speech Language Treatment, SLP Self Care / ADL Training , and SLP Group Treatment                Problem: Memory STGs       Dates: Start:  03/22/25         Goal: STG-Within one week, patient will       Dates: Start:  03/22/25       Description: 1) Individualized goal:  complete working memory tasks w/ 80% accuracy and MIN A.   2) Interventions:  SLP Self Care / ADL Training , SLP Cognitive Skill Development, and SLP Group Treatment                Problem: Problem Solving STGs       Dates: Start:  03/22/25         Goal: STG-Within one week, patient will       Dates: Start:  03/22/25       Description: 1) Individualized goal:  complete SCCAN w/ further goals developed as appropriate.   2) Interventions:  SLP Cognitive Skill Development             Goal: STG-Within one week, patient will       Dates: Start:  03/22/25       Description: 1) Individualized goal:  complete right scanning/neglect tasks w/ MIN A and 80% acc.   2) Interventions:  SLP Self Care / ADL Training , SLP Cognitive Skill Development, and SLP Group Treatment                Problem: Social Interaction STGs       Dates: Start:  03/22/25         Goal: STG-Within one week, patient will       Dates: Start:  03/22/25               Problem: Speech/Swallowing LTGs       Dates: Start:  03/22/25         Goal: LTG-By discharge, patient will safely swallow       Dates: Start:  03/22/25       Description: 1) Individualized goal:  regular textures and thin liquids implementing safe swallow strategies in >90% of opportunities with MOD I for a safe D/C to PLOF.   2) Interventions:  SLP Swallowing Dysfunction Treatment, SLP Self Care / ADL Training , and SLP Group Treatment             Goal: LTG-By discharge, patient will solve basic problems       Dates: Start:  03/22/25       Description: 1) Individualized goal:  w/ 80% acc and MOD I for a safe D/C to PLOF.   2) Interventions:  SLP Speech Language  Treatment, SLP Self Care / ADL Training , SLP Cognitive Skill Development, and SLP Group Treatment                Problem: Swallowing STGs       Dates: Start:  03/22/25         Goal: STG-Within one week, patient will       Dates: Start:  03/22/25

## 2025-03-24 NOTE — PROGRESS NOTES
NURSING DAILY NOTE    Name: Lico Caba   Date of Admission: 3/21/2025   Admitting Diagnosis: Intracranial hemorrhage, spontaneous intraparenchymal, idiopathic, acute ([facility])  Attending Physician: SAVITA GOODMAN M.D.  Allergies: Patient has no known allergies.    Safety  Patient Assist  cga  Patient Precautions  Precautions: Fall Risk, Cognition/Communication  Fall Risk: Poor balance, Poor safety, Impulsive, Alarming seat belt, Low vision, Visual spatial neglect/Neglect program  Cognition/Communication: Confused, Expressive Aphasia, Receptive Aphasia, Hearing Aids  Swallow: Therapeutic dining (T-dine for self feeding d/t R neglect/field cut)  Comments: R visual field cut  Bed Transfer Status  Minimal Assist  Toilet Transfer Status   Minimal Assist  Assistive Devices  Rails, Wheelchair  Oxygen  None - Room Air  Diet/Therapeutic Dining  Current Diet Order   Procedures    Diet Order Diet: Regular (T-dine)     Pill Administration  whole  Agitated Behavioral Scale  17  ABS Level of Severity  No Agitation    Fall Risk  Has the patient had a fall this admission?      Genesis Elizondo Fall Risk Scoring  22, HIGH RISK  Fall Risk Safety Measures  bed alarm, chair alarm, seatbelt alarm, and low vision/hearing    Vitals  Temperature: 36.8 °C (98.2 °F)  Temp src: Temporal  Pulse: (!) 52  Respiration: 18  Blood Pressure : 123/71  Blood Pressure MAP (Calculated): 88 MM HG  BP Location: Right, Upper Arm  Patient BP Position: Supine     Oxygen  Pulse Oximetry: 97 %  O2 (LPM): 0  O2 Delivery Device: None - Room Air    Bowel and Bladder  Last Bowel Movement  03/22/25  Stool Type     Bowel Device     Continent  Bladder: Did not void   Bowel: No movement  Bladder Function  Urine Void (mL): 200 ml  Number of Times Voided: 1  Urine Color: Yellow  Genitourinary Assessment   Bladder Assessment (WDL):  Within Defined Limits  Urine Color: Yellow  Bladder Device: Bathroom  Bladder Scan: Post Void  $ Bladder Scan Results (mL):  34    Skin  Dereje Score   19  Sensory Interventions   Bed Types: Standard/Trauma Mattress with Overlay  Skin Preventative Measures: Pillows in Use for Support / Positioning  Moisture Interventions         Pain  Pain Rating Scale  0 - No Pain  Pain Location  Head  Pain Location Orientation  Inner  Pain Interventions   Repositioned, Cold Pack    ADLs    Bathing   Patient Refused Bathing  Linen Change   Partial  Personal Hygiene     Chlorhexidine Bath      Oral Care     Teeth/Dentures     Shave     Nutrition Percentage Eaten  *  * Meal *  *, Dinner, Between % Consumed  Environmental Precautions  Treaded Slipper Socks on Patient, Personal Belongings, Wastebasket, Call Bell etc. in Easy Reach, Bed in Low Position  Patient Turns/Positioning  Sitting up in wheelchair  Patient Turns Assistance/Tolerance     Bed Positions  Bed Controls On, Bed Locked  Head of Bed Elevated  Self regulated      Psychosocial/Neurologic Assessment  Psychosocial Assessment  Psychosocial (WDL):  WDL Except  Patient Behaviors: Forgetful  Family Behaviors: Anxious  Neurologic Assessment  Neuro (WDL): Exceptions to WDL  Level of Consciousness: Alert  Orientation Level: Disoriented to time  Cognition: Follows commands, Poor attention/concentration, Appropriate safety awareness  Speech: Slurred  Motor Function/Sensation Assessment: Motor strength  Muscle Strength Right Arm: Good Strength Against Gravity and Moderate Resistance  Muscle Strength Left Arm: Good Strength Against Gravity and Moderate Resistance  Muscle Strength Right Leg: Good Strength Against Gravity and Moderate Resistance  Muscle Strength Left Leg: Good Strength Against Gravity and Moderate Resistance  EENT (WDL):  WDL Except    Cardio/Pulmonary Assessment  Edema      Respiratory Breath Sounds  RUL Breath Sounds: Clear  RML Breath Sounds: Clear  RLL Breath Sounds: Diminished  ZACH Breath Sounds: Clear  LLL Breath Sounds: Diminished  Cardiac Assessment   Cardiac (WDL):  Within  Defined Limits

## 2025-03-24 NOTE — PROGRESS NOTES
Physical Medicine & Rehabilitation Progress Note  Encounter Date: 3/23/2025    Chief Complaint:   Stroke    Interval Events (Subjective):  No acute events overnight.  Headache stable at the time I was seeing the patient    Objective:  VITAL SIGNS: VITAL SIGNS:   Vitals:    25 0500 25 0700 25 1012 25 1455   BP: 124/70  122/65 124/65   Pulse: (!) 54  61 60   Resp: 18  16 18   Temp: 37 °C (98.6 °F)  36.3 °C (97.4 °F) 36.3 °C (97.4 °F)   TempSrc: Oral  Oral Temporal   SpO2: 96%  97% 96%   Weight:  81.5 kg (179 lb 10.8 oz)           Gen: NAD  Psych: Mood and affect appropriate  CV: RRR, no edema  Resp: CTAB, no upper airway sounds  Abd: NTND  Neuro: Alert and oriented x 3.  Right hemiplegia    Laboratory Values:  Recent Results (from the past 72 hours)   EKG    Collection Time: 25  1:38 PM   Result Value Ref Range    Report       [facility]    Test Date:  2025  Pt Name:    SHABBIR MIRANDA           Department: [facility]  MRN:        [id number]                      Room:       [facility]  Gender:     Male                         Technician: DIETER  :        [id number]                   Requested By:SAVITA GOODMAN  Order #:    [id number]                    Reading MD: Erick Weir MD    Measurements  Intervals                                Axis  Rate:       63                           P:          55  ME:         178                          QRS:        30  QRSD:       146                          T:          44  QT:         414  QTc:        424    Interpretive Statements  Sinus rhythm  Nonspecific intraventricular conduction delay  Nonspecific ST-T wave changes  Electronically Signed On 2025 13:38:27 PDT by Erick Weir MD     CBC with Differential    Collection Time: 25  5:44 AM   Result Value Ref Range    WBC 12.8 (H) 4.8 - 10.8 K/uL    RBC 4.93 4.70 - 6.10 M/uL    Hemoglobin 15.2 14.0 - 18.0 g/dL    Hematocrit 45.6 42.0 - 52.0 %    MCV 92.5 81.4 - 97.8 fL    MCH 30.8 27.0  - 33.0 pg    MCHC 33.3 32.3 - 36.5 g/dL    RDW 45.8 35.9 - 50.0 fL    Platelet Count 242 164 - 446 K/uL    MPV 9.7 9.0 - 12.9 fL    Neutrophils-Polys 69.20 44.00 - 72.00 %    Lymphocytes 18.60 (L) 22.00 - 41.00 %    Monocytes 11.00 0.00 - 13.40 %    Eosinophils 0.20 0.00 - 6.90 %    Basophils 0.20 0.00 - 1.80 %    Immature Granulocytes 0.80 0.00 - 0.90 %    Nucleated RBC 0.00 0.00 - 0.20 /100 WBC    Neutrophils (Absolute) 8.85 (H) 1.82 - 7.42 K/uL    Lymphs (Absolute) 2.37 1.00 - 4.80 K/uL    Monos (Absolute) 1.40 (H) 0.00 - 0.85 K/uL    Eos (Absolute) 0.02 0.00 - 0.51 K/uL    Baso (Absolute) 0.02 0.00 - 0.12 K/uL    Immature Granulocytes (abs) 0.10 0.00 - 0.11 K/uL    NRBC (Absolute) 0.00 K/uL   Comp Metabolic Panel (CMP)    Collection Time: 03/22/25  5:44 AM   Result Value Ref Range    Sodium 136 135 - 145 mmol/L    Potassium 4.1 3.6 - 5.5 mmol/L    Chloride 103 96 - 112 mmol/L    Co2 21 20 - 33 mmol/L    Anion Gap 12.0 7.0 - 16.0    Glucose 96 65 - 99 mg/dL    Bun 24 (H) 8 - 22 mg/dL    Creatinine 0.82 0.50 - 1.40 mg/dL    Calcium 8.2 (L) 8.5 - 10.5 mg/dL    Correct Calcium 8.5 8.5 - 10.5 mg/dL    AST(SGOT) 33 12 - 45 U/L    ALT(SGPT) 103 (H) 2 - 50 U/L    Alkaline Phosphatase 91 30 - 99 U/L    Total Bilirubin 0.5 0.1 - 1.5 mg/dL    Albumin 3.6 3.2 - 4.9 g/dL    Total Protein 6.1 6.0 - 8.2 g/dL    Globulin 2.5 1.9 - 3.5 g/dL    A-G Ratio 1.4 g/dL   Magnesium    Collection Time: 03/22/25  5:44 AM   Result Value Ref Range    Magnesium 2.5 1.5 - 2.5 mg/dL   Vitamin D, 25-hydroxy (blood)    Collection Time: 03/22/25  5:44 AM   Result Value Ref Range    25-Hydroxy   Vitamin D 25 32 30 - 100 ng/mL   ESTIMATED GFR    Collection Time: 03/22/25  5:44 AM   Result Value Ref Range    GFR (CKD-EPI) 91 >60 mL/min/1.73 m 2       Medications:  Scheduled Medications   Medication Dose Frequency    Pharmacy Consult Request  1 Each PHARMACY TO DOSE    senna-docusate  2 Tablet Q EVENING    omeprazole  20 mg DAILY    acetaminophen   1,000 mg TID    levETIRAcetam  500 mg BID    predniSONE  50 mg DAILY     PRN medications: Respiratory Therapy Consult, hydrALAZINE, senna-docusate **AND** polyethylene glycol/lytes, ondansetron **OR** ondansetron, traZODone, sodium chloride, butalbital/apap/caffeine, oxyCODONE immediate-release    Bowel:  Last Documented BM Date:   Last Documented BM Description: Stool Description: Large;Brown;Soft    Bladder:  Last Documented Urine Void (mL): 300 ml  Last Documented Bladder Scan: Post Void  Last Documented Bladder Scan Results (mL): 34    Physical Therapy:  Bed Mobility    Transfers        Mobility     Stairs    Barriers to Discharge Home:      Occupational Therapy:  Grooming     Bathing     UB Dressing     LB Dressing     Toileting     Shower & Transfer     Barriers to Discharge Home:    Diet:  Current Diet Order   Procedures    Diet Order Diet: Regular (T-dine)     Medical Decision Making and Plan:  Left hemorrhagic stroke with right hemiplegia  Continue aspirin and statin for secondary stroke prophylaxis  Continue Keppra  Continue comprehensive inpatient rehabilitation    Pain  Continue Tylenol as needed, continue Fioricet as needed.  Continue prednisone from admission.  Last dose scheduled for Monday  Labs reviewed.  Vitals reviewed.    DVT prophylaxis: Chemoprophylaxis contraindicated due to hemorrhagic stroke    GI prophylaxis continue Vielkalosec    CINDY, Rui Oneil M.D., personally performed a complete drug regimen review and no potential clinically significant medication issues were identified.  _____________________________________    Rui Oneil MD  [facility] - Physical Medicine & Rehabilitation     _____________________________________

## 2025-03-24 NOTE — CARE PLAN
Problem: Bathing  Goal: STG-Within one week, patient will bathe at SBA level.  Outcome: Progressing  Note: Not yet addressed     Problem: Dressing  Goal: STG-Within one week, patient will dress LB at SBA level.  Outcome: Progressing  Note: Not yet addressed     Problem: Toileting  Goal: STG-Within one week, patient will complete toileting tasks at SBA level.  Outcome: Progressing  Note: Not yet addressed     Problem: Functional Transfers  Goal: STG-Within one week, patient will transfer to toilet at SBA level.  Outcome: Not Met  Note: Requires CGA  Goal: STG-Within one week, patient will transfer to tub/shower at SBA level.  Outcome: Not Met  Note: Not yet addressed

## 2025-03-24 NOTE — CARE PLAN
"  Problem: Fall Risk - Rehab  Goal: Patient will remain free from falls  Outcome: Progressing  Note: Genesis Elizondo Fall risk Assessment Score: 22    High fall risk Interventions   - Alarming seatbelt  - Bed and strip alarm   - Yellow sign by the door   - Yellow wrist band \"Fall risk\"  - Room near to the nurse station  - Do not leave patient unattended in the bathroom  - Fall risk education provided       Problem: Psychosocial  Goal: Patient's level of anxiety will decrease  Outcome: Progressing  Note: Pt calm, impulsive at times, reinforced to use call light for assistance.Needs anticipated and attended.     Problem: Pain - Standard  Goal: Alleviation of pain or a reduction in pain to the patient’s comfort goal  Outcome: Progressing  Note: Assessed for pain  and discomfort , pt denies pain , needs anticipated and attended.    The patient is Stable - Low risk of patient condition declining or worsening    Shift Goals  Clinical Goals: Comfort and safety  Patient Goals: Comfort    Progress made toward(s) clinical / shift goals:  Pt free from fall and injury.    "

## 2025-03-25 ENCOUNTER — APPOINTMENT (OUTPATIENT)
Dept: PHYSICAL THERAPY | Facility: REHABILITATION | Age: 75
DRG: 057 | End: 2025-03-25
Attending: PHYSICAL MEDICINE & REHABILITATION
Payer: COMMERCIAL

## 2025-03-25 ENCOUNTER — APPOINTMENT (OUTPATIENT)
Dept: SPEECH THERAPY | Facility: REHABILITATION | Age: 75
DRG: 057 | End: 2025-03-25
Attending: PHYSICAL MEDICINE & REHABILITATION
Payer: COMMERCIAL

## 2025-03-25 ENCOUNTER — APPOINTMENT (OUTPATIENT)
Dept: OCCUPATIONAL THERAPY | Facility: REHABILITATION | Age: 75
DRG: 057 | End: 2025-03-25
Attending: PHYSICAL MEDICINE & REHABILITATION
Payer: COMMERCIAL

## 2025-03-25 LAB
ALBUMIN SERPL BCP-MCNC: 3.5 G/DL (ref 3.2–4.9)
ALBUMIN/GLOB SERPL: 1.5 G/DL
ALP SERPL-CCNC: 81 U/L (ref 30–99)
ALT SERPL-CCNC: 79 U/L (ref 2–50)
ANION GAP SERPL CALC-SCNC: 14 MMOL/L (ref 7–16)
AST SERPL-CCNC: 24 U/L (ref 12–45)
BILIRUB SERPL-MCNC: 0.5 MG/DL (ref 0.1–1.5)
BUN SERPL-MCNC: 17 MG/DL (ref 8–22)
CALCIUM ALBUM COR SERPL-MCNC: 8.8 MG/DL (ref 8.5–10.5)
CALCIUM SERPL-MCNC: 8.4 MG/DL (ref 8.5–10.5)
CHLORIDE SERPL-SCNC: 101 MMOL/L (ref 96–112)
CO2 SERPL-SCNC: 22 MMOL/L (ref 20–33)
CREAT SERPL-MCNC: 0.8 MG/DL (ref 0.5–1.4)
ERYTHROCYTE [DISTWIDTH] IN BLOOD BY AUTOMATED COUNT: 44.1 FL (ref 35.9–50)
GFR SERPLBLD CREATININE-BSD FMLA CKD-EPI: 92 ML/MIN/1.73 M 2
GLOBULIN SER CALC-MCNC: 2.4 G/DL (ref 1.9–3.5)
GLUCOSE SERPL-MCNC: 83 MG/DL (ref 65–99)
HCT VFR BLD AUTO: 43.9 % (ref 42–52)
HGB BLD-MCNC: 14.8 G/DL (ref 14–18)
MCH RBC QN AUTO: 30.8 PG (ref 27–33)
MCHC RBC AUTO-ENTMCNC: 33.7 G/DL (ref 32.3–36.5)
MCV RBC AUTO: 91.3 FL (ref 81.4–97.8)
PLATELET # BLD AUTO: 260 K/UL (ref 164–446)
PMV BLD AUTO: 9.8 FL (ref 9–12.9)
POTASSIUM SERPL-SCNC: 4.3 MMOL/L (ref 3.6–5.5)
PROT SERPL-MCNC: 5.9 G/DL (ref 6–8.2)
RBC # BLD AUTO: 4.81 M/UL (ref 4.7–6.1)
SODIUM SERPL-SCNC: 137 MMOL/L (ref 135–145)
WBC # BLD AUTO: 10.8 K/UL (ref 4.8–10.8)

## 2025-03-25 PROCEDURE — 80053 COMPREHEN METABOLIC PANEL: CPT

## 2025-03-25 PROCEDURE — 770010 HCHG ROOM/CARE - REHAB SEMI PRIVAT*

## 2025-03-25 PROCEDURE — 92507 TX SP LANG VOICE COMM INDIV: CPT

## 2025-03-25 PROCEDURE — 97530 THERAPEUTIC ACTIVITIES: CPT

## 2025-03-25 PROCEDURE — A9270 NON-COVERED ITEM OR SERVICE: HCPCS | Performed by: PHYSICAL MEDICINE & REHABILITATION

## 2025-03-25 PROCEDURE — 700102 HCHG RX REV CODE 250 W/ 637 OVERRIDE(OP): Performed by: PHYSICAL MEDICINE & REHABILITATION

## 2025-03-25 PROCEDURE — 97116 GAIT TRAINING THERAPY: CPT

## 2025-03-25 PROCEDURE — 85027 COMPLETE CBC AUTOMATED: CPT

## 2025-03-25 PROCEDURE — 36415 COLL VENOUS BLD VENIPUNCTURE: CPT

## 2025-03-25 PROCEDURE — 97112 NEUROMUSCULAR REEDUCATION: CPT

## 2025-03-25 PROCEDURE — 99233 SBSQ HOSP IP/OBS HIGH 50: CPT | Performed by: PHYSICAL MEDICINE & REHABILITATION

## 2025-03-25 RX ADMIN — LEVETIRACETAM 500 MG: 500 TABLET, FILM COATED ORAL at 19:54

## 2025-03-25 RX ADMIN — LEVETIRACETAM 500 MG: 500 TABLET, FILM COATED ORAL at 08:41

## 2025-03-25 RX ADMIN — POLYETHYLENE GLYCOL 3350 1 PACKET: 17 POWDER, FOR SOLUTION ORAL at 08:47

## 2025-03-25 RX ADMIN — SENNOSIDES AND DOCUSATE SODIUM 2 TABLET: 50; 8.6 TABLET ORAL at 19:54

## 2025-03-25 RX ADMIN — ACETAMINOPHEN 1000 MG: 500 TABLET ORAL at 08:41

## 2025-03-25 RX ADMIN — ACETAMINOPHEN 1000 MG: 500 TABLET ORAL at 19:54

## 2025-03-25 SDOH — ECONOMIC STABILITY: TRANSPORTATION INSECURITY
IN THE PAST 12 MONTHS, HAS THE LACK OF TRANSPORTATION KEPT YOU FROM MEDICAL APPOINTMENTS OR FROM GETTING MEDICATIONS?: NO

## 2025-03-25 SDOH — ECONOMIC STABILITY: TRANSPORTATION INSECURITY
IN THE PAST 12 MONTHS, HAS LACK OF RELIABLE TRANSPORTATION KEPT YOU FROM MEDICAL APPOINTMENTS, MEETINGS, WORK OR FROM GETTING THINGS NEEDED FOR DAILY LIVING?: NO

## 2025-03-25 ASSESSMENT — PAIN DESCRIPTION - PAIN TYPE: TYPE: ACUTE PAIN

## 2025-03-25 NOTE — PROGRESS NOTES
NURSING DAILY NOTE    Name: Lico Caba   Date of Admission: 3/21/2025   Admitting Diagnosis: Intracranial hemorrhage, spontaneous intraparenchymal, idiopathic, acute ([facility])  Attending Physician: SAVITA GOODMAN M.D.  Allergies: Patient has no known allergies.    Safety  Patient Assist  cga  Patient Precautions  Precautions: Fall Risk, Cognition/Communication  Fall Risk: Poor balance, Poor safety, Impulsive, Low vision, Visual spatial neglect/Neglect program  Cognition/Communication: Confused, Expressive Aphasia, Receptive Aphasia, Hearing Aids  Swallow: Therapeutic dining (T-dine for self feeding d/t R neglect/field cut)  Comments: R visual field cut  Bed Transfer Status  Contact Guard Assist  Toilet Transfer Status   Minimal Assist  Assistive Devices  Rails, Wheelchair  Oxygen  None - Room Air  Diet/Therapeutic Dining  Current Diet Order   Procedures    Diet Order Diet: Regular (T-dine, meats cut up)     Pill Administration  whole  Agitated Behavioral Scale  17  ABS Level of Severity  No Agitation    Fall Risk  Has the patient had a fall this admission?      Genesis Elizondo Fall Risk Scoring  22, HIGH RISK  Fall Risk Safety Measures  bed alarm and chair alarm    Vitals  Temperature: 36.5 °C (97.7 °F)  Temp src: Oral  Pulse: 64  Respiration: 18  Blood Pressure : 126/74  Blood Pressure MAP (Calculated): 91 MM HG  BP Location: Right, Upper Arm  Patient BP Position: Sitting     Oxygen  Pulse Oximetry: 95 %  O2 (LPM): 0  O2 Delivery Device: None - Room Air    Bowel and Bladder  Last Bowel Movement  03/22/25  Stool Type     Bowel Device  Bathroom  Continent  Bladder: Did not void   Bowel: No movement  Bladder Function  Urine Void (mL): 200 ml  Number of Times Voided: 1  Urine Color: Yellow  Genitourinary Assessment   Bladder Assessment (WDL):  Within Defined Limits  Urine Color: Yellow  Bladder Device: Bathroom, Urinal  Bladder Scan: Post Void  $ Bladder Scan Results (mL): 34    Skin  Dereje Score    19  Sensory Interventions   Bed Types: Standard/Trauma Mattress  Skin Preventative Measures: Pillows in Use for Support / Positioning  Moisture Interventions         Pain  Pain Rating Scale  0 - No Pain  Pain Location  Head  Pain Location Orientation  Inner  Pain Interventions   Repositioned, Rest    ADLs    Bathing   Shower, Staff, Family / Significant Other  Linen Change   Partial  Personal Hygiene     Chlorhexidine Bath      Oral Care     Teeth/Dentures     Shave     Nutrition Percentage Eaten  Lunch, Between % Consumed (90%)  Environmental Precautions  Treaded Slipper Socks on Patient, Personal Belongings, Wastebasket, Call Bell etc. in Easy Reach, Bed in Low Position  Patient Turns/Positioning  Patient turns self independently side to side without assistance, to offload sacral area  Patient Turns Assistance/Tolerance     Bed Positions  Bed Controls On, Bed Locked  Head of Bed Elevated  Self regulated      Psychosocial/Neurologic Assessment  Psychosocial Assessment  Psychosocial (WDL):  WDL Except  Patient Behaviors: Forgetful  Family Behaviors: No Family Present  Neurologic Assessment  Neuro (WDL): Exceptions to WDL  Level of Consciousness: Alert  Orientation Level: Disoriented to time  Cognition: Follows commands, Poor attention/concentration, Appropriate safety awareness  Speech: Slurred  Motor Function/Sensation Assessment: Motor strength  Muscle Strength Right Arm: Good Strength Against Gravity and Moderate Resistance  Muscle Strength Left Arm: Good Strength Against Gravity and Moderate Resistance  Muscle Strength Right Leg: Good Strength Against Gravity and Moderate Resistance  Muscle Strength Left Leg: Good Strength Against Gravity and Moderate Resistance  EENT (WDL):  WDL Except    Cardio/Pulmonary Assessment  Edema      Respiratory Breath Sounds  RUL Breath Sounds: Clear  RML Breath Sounds: Clear  RLL Breath Sounds: Diminished  ZACH Breath Sounds: Clear  LLL Breath Sounds: Diminished  Cardiac  Assessment   Cardiac (WDL):  Within Defined Limits

## 2025-03-25 NOTE — THERAPY
Physical Therapy   Daily Treatment     Patient Name:  Lico Caba  Age:  74 y.o., Sex:  male  Medical Record #:  [id number]  Today's Date: 3/25/2025     Precautions  Precautions: Fall Risk, Cognition/Communication  Fall Risk: Poor balance, Poor safety, Impulsive, Low vision, Visual spatial neglect/Neglect program  Cognition/Communication: Confused, Expressive Aphasia, Receptive Aphasia, Hearing Aids  Swallow: Therapeutic dining (T-dine for self feeding d/t R neglect/field cut)  Comments: R visual field cut    Subjective: Agreeable to both sessions. Improving in conversational speech but continues to have difficulty with understanding commands for novel tasks    Objective:    03/25/25 0730   PT Charge Group   PT Gait Training (Units) 1   PT Neuromuscular Re-Education / Balance (Units) 1   PT Total Time Spent   PT Individual Total Time Spent (Mins) 30   Roll Left and Right   Level of Assist Independent   Sit to Lying   Level of Assist Independent   Lying to Sitting on Side of Bed   Level of Assist Independent   Sit to Stand   Level of Assist Stand By Assist   Assistive Devices No AD   Ambulation   Level of Assist Stand By Assist;Contact Guard Assist   Assistive Device No AD   Additional Description Cueing needed;Extra time needed   Distance [id number]   Interdisciplinary Plan of Care Collaboration   Patient Position at End of Therapy Seated;Chair Alarm On;Self Releasing Lap Belt Applied  (t-dine)        03/25/25 0930   PT Charge Group   PT Gait Training (Units) 2   PT Neuromuscular Re-Education / Balance (Units) 2   PT Total Time Spent   PT Individual Total Time Spent (Mins) 60   Sit to Stand   Level of Assist Stand By Assist   Assistive Devices No AD   Additional Description Extra time needed   Ambulation   Level of Assist Minimal Assist  (SBA-Cyrus for LOB)   Assistive Device No AD   Additional Description Cueing needed;Extra time needed   Distance [id number]   Curbs   Level of Assist Contact Guard Assist  "  Assistive Device No AD   Curb Height 6\" step   Additional Description Cueing needed;Extra time needed   Interdisciplinary Plan of Care Collaboration   IDT Collaboration with  Family / Caregiver   Patient Position at End of Therapy Seated;Chair Alarm On;Self Releasing Lap Belt Applied;Family / Friend in Room   Collaboration Comments spouse, Cecy, present and engaged during session       Gait Training  Purpose: to improve functional ambulation, to address gait deviations, and to improve walking endurance  Interventions:   Walking endurance  Normalization of gait   Ambulation around obstacles - reciprocal stepping over 1-4inch hurdles with Cyrus  Ambulation with use of Infogram BWS System:   Distance: 0.6 mi  Speed: 0.5-0.7 mph  Time: 6 min total (2 min increments with brief standing rest break between)  Deviations (laterality in comments):  Bradykinetic  Shuffled pattern  Mild list right  Neuro Re-Education  Purpose: to improve balance, to improve timing, coordination, and recruitment of muscles, to improve postural control, and to improve visuoperceptual function  Interventions:   Static/Dynamic standing balance training  Coordination training  Motor control/learning training  Facilitation/Cueing: Tactile cues, Visual cues, Verbal cues, and Facilitation  Dynamic gait with head turns L/R and up/down on level ground  Path finding from main gym to room using context clues to find pt's room (only completed left hand turns and mod cueing to scan right)  Dynamic standing balance with decreasing UE support and Cyrus soccer ball kick to goal/partner with ball approach from the right      Assessment: Shannan continues to be limited by right neglect, right visual field cut, and difficulty following verbal commands due to receptive aphasia. He demonstrated fair carryover of increased step length training from first session to second, however when presented with dual task he reverted to short shuffled stepping.     Strengths: " Adequate strength, Independent prior level of function, Making steady progress towards goals, Motivated for self care and independence, Pleasant and cooperative, Supportive family, Willingly participates in therapeutic activities  Barriers: Aphasia expressive, Aphasia receptive, Hearing impairment, Impaired balance, Impaired insight/denial of deficits, Impaired functional cognition, Visual impairment    Plan:   Ambulation with no AD (FT with spouse for FWW)  R sided visual scanning/compensatory strategies   Dynamic standing balance     DME    PT DME Recommendations  Assistive Device:  (none anticipated)      Passport items to be completed:  Get in/out of bed safely, in/out of a vehicle, safely use mobility device, walk or wheel around home/community, navigate up and down stairs, show how to get up/down from the ground, ensure home is accessible, demonstrate HEP, complete caregiver training    Physical Therapy Problems (Active)       Problem: Mobility       Dates: Start:  03/22/25         Goal: STG-Within one week, patient will ambulate 300ft SBA no device with moderate  verbal cueing to scan right       Dates: Start:  03/22/25               Problem: Mobility Transfers       Dates: Start:  03/22/25         Goal: STG-Within one week, patient will perform bed mobility independently       Dates: Start:  03/22/25            Goal: STG-Within one week, patient will transfer bed to chair SBA       Dates: Start:  03/22/25               Problem: PT-Long Term Goals       Dates: Start:  03/22/25         Goal: LTG-By discharge, patient will improve score on Ding balance assessment to >/= 41/56 to indicate low fall risk       Dates: Start:  03/22/25            Goal: LTG-By discharge, patient will ambulate 500ft SPV no device       Dates: Start:  03/22/25            Goal: LTG-By discharge, patient will transfer one surface to another independently       Dates: Start:  03/22/25            Goal: LTG-By discharge, patient will  participate in community outing with minimal verbal cueing to scan right in novel environment and no physical assistance required for mobility       Dates: Start:  03/22/25

## 2025-03-25 NOTE — DISCHARGE PLANNING
CASE MANAGEMENT INITIAL ASSESSMENT    Admit Date:  3/21/2025     Chart review. CM will discuss role of case management / discharge planning / team conference.   Patient is a  74 y.o. male transferred from [facility].    Diagnosis: Intracranial hemorrhage, spontaneous intraparenchymal, idiopathic, acute (HCC) [I62.9]    Co-morbidities:   Patient Active Problem List    Diagnosis Date Noted    Intracranial hemorrhage, spontaneous intraparenchymal, idiopathic, acute (HCC) 03/21/2025    Spinal stenosis of lumbar region with radiculopathy 01/05/2022    Neurogenic claudication due to lumbar spinal stenosis 12/30/2021     Prior Living Situation:  Housing : 1 Story House  Lives with: Spouse    Prior Level of Function:  Medication Management: Independent  Finances: Independent  Home Management: Independent  Shopping: Independent  Prior Level Of Mobility: Independent Without Device in Community, Independent With Steps in Community, Independent Without Device in Home, Independent With Steps in Home  Driving: Driving Independent    Support Systems:  Primary : Spouse Lilia dobbs#[phone]     Previous Services Utilized:   Equipment Owned: None  Prior Services: Home-Independent    Other Information:  Occupation: Retired Due To Age  Primary Payor Source: [employer]  Secondary Payor Source: Medicare B  [facility] Practition[facility]    Patient / Family Goal: Local [facility] 1STE with spouse. Pt is IADLs, drives, and has no DME at baseline per chart notes.    Plan:  1. Continue to follow patient through hospitalization and provide discharge planning in collaboration with patient, family, physicians and ancillary services.     2. Utilize community resources to ensure a safe discharge.

## 2025-03-25 NOTE — THERAPY
Occupational Therapy  Daily Treatment     Patient Name:  Lico Caba  Age:  74 y.o., Sex:  male  Medical Record #:  [id number]  Today's Date:  3/25/2025    Precautions  Precautions: Fall Risk, Cognition/Communication  Fall Risk: Poor balance, Poor safety, Low vision, Visual spatial neglect/Neglect program  Cognition/Communication: Expressive Aphasia, Hearing Aids  Swallow: Therapeutic dining  Comments: R visual field cut    Subjective: Pt seated in w/c upon arrival, pleasant and cooperative, agreeable to therapy. Spouse Cecy present throughout     Objective:    03/25/25 1101   OT Charge Group   OT Therapy Activity (Units) 2   OT Total Time Spent   OT Individual Total Time Spent (Mins) 30   Sit to Stand   Level of Assist Stand By Assist   Assistive Devices FWW   Interdisciplinary Plan of Care Collaboration   Patient Position at End of Therapy Seated;Chair Alarm On;Self Releasing Lap Belt Applied;Call Light within Reach;Tray Table within Reach;Family / Friend in Room     Therapeutic Activities  Purpose: to improve performance and function of daily activities, to provide patient and family education, and to increase safety with activities of daily life and mobility related to activities of daily life  Interventions: initiated FT with spouse on functional mobility at FWW level and safety strategies with use of gait belt. Edu provided re: type of prompting/cueing that works best when assisting him with navigating in his environment when he has R side field cut/neglect and to limit distractions. Also edu on hospital protocol - alarms, return to staff assist if trained family member not present, indoors only.     Assessment:    Spouse able to assist pt appropriately during FWW ambulation throughout hospital - indoors. Min cues for use of gait belt, postioning on R side, prompting pt to pause activity to regroup for improve safety. Continue family training to ensure carry-over of learning    Strengths:  Independent prior level of function, Making steady progress towards goals, Motivated for self care and independence, Pleasant and cooperative, Supportive family, Willingly participates in therapeutic activities  Barriers: Aphasia expressive, Aphasia receptive, Confused, Decreased endurance, Difficulty following instructions, Fatigue, Generalized weakness, Impaired activity tolerance, Impaired balance, Impaired carryover of learning, Impaired insight/denial of deficits, Impaired functional cognition, Impulsive, Limited mobility, Visual impairment    Plan:  Shower tomorrow - trial with no AD    R visual scanning and safety awareness, RUE neuro re-ed (ataxia), functional cognition (memory, aphasia, apraxia, attention, problem solving), overall strength/endurance and standing balance     Lives with spouse, 1 story, 1 CAROL ANN, WIS, tub    Occupational Therapy Goals (Active)       Problem: Bathing       Dates: Start:  03/22/25         Goal: STG-Within one week, patient will bathe at SBA level.       Dates: Start:  03/22/25         Goal Note filed on 03/24/25 1440 by Kaya Blackburn MS,OTR/L       Not yet addressed                 Problem: Dressing       Dates: Start:  03/22/25         Goal: STG-Within one week, patient will dress LB at SBA level.       Dates: Start:  03/22/25         Goal Note filed on 03/24/25 1440 by Kaya Blackburn MS,OTR/L       Not yet addressed                 Problem: Functional Transfers       Dates: Start:  03/22/25         Goal: STG-Within one week, patient will transfer to toilet at SBA level.       Dates: Start:  03/22/25         Goal Note filed on 03/24/25 1440 by Kaya Blackburn MS,OTR/L       Requires CGA              Goal: STG-Within one week, patient will transfer to tub/shower at SBA level.       Dates: Start:  03/22/25         Goal Note filed on 03/24/25 1440 by Kaya Blackburn MS,OTR/L       Not yet addressed                 Problem: OT Long Term Goals       Dates: Start:   03/22/25         Goal: LTG-By discharge, patient will complete basic self care tasks at SPV-Mod I level.       Dates: Start:  03/22/25            Goal: LTG-By discharge, patient will perform bathroom transfers at SPV-Mod I level.       Dates: Start:  03/22/25               Problem: Toileting       Dates: Start:  03/22/25         Goal: STG-Within one week, patient will complete toileting tasks at SBA level.       Dates: Start:  03/22/25         Goal Note filed on 03/24/25 1440 by Kaya Blackburn MS,OTR/L       Not yet addressed

## 2025-03-25 NOTE — PROGRESS NOTES
Physical Medicine & Rehabilitation Progress Note  _____________________________________  Interdisciplinary Team Conference   Most recent IDT on 3/25/2025    IVivek M.D., was present and led the interdisciplinary team conference on 3/25/2025.  I led the IDT conference and agree with the IDT conference documentation and plan of care as noted below.       Nursing:  Diet Current Diet Order   Procedures    Diet Order Diet: Regular (T-dine, meats cut up)       Eating ADL Set Up, Supervised, Moderate Assist     % of Last Meal  Oral Nutrition: Lunch, Between % Consumed (90%)   Sleep    Bowel Last BM: 03/22/25   Bladder    Barriers to Discharge Home:   Weakness, aphasia      Physical Therapy:  Bed Mobility Roll Left and Right: Independent  Sit to Lying: Independent  Lying to Sitting: Independent   Transfers Sit to Stand: Stand By Assist  Chair/Bed to Chair: Stand By Assist  Toilet: Minimal Assist  Car: Minimal Assist   Mobility Ambulation: Stand By Assist, Contact Guard Assist  Device: No AD  Ambulation Distance: [id number]  Wheelchair: N/A  Type:    Wheelchair Distance:     Stairs/Curbs Stairs: Minimal Assist  Stairs Amount: 12  Curbs: Minimal Assist   Barriers to Discharge Home:   Weakness, aphasia      Occupational Therapy:  Oral Hygiene Minimal Assist   Grooming     Shower/Bathing Minimal Assist   UB Dressing Stand by Assist   LB Dressing Contact Guard Assist  Stand by Assist   Toileting Transfer: Minimal Assist  Hygiene: Contact Guard Assist   Shower & Transfer Minimal Assist     Barriers to Discharge Home:   Weakness, aphasia      Speech-Language Pathology:  Comprehension Level of Assist:  Minimal Assist, Moderate Assist  Comprehension Description:  Cueing needed, Extra time needed  Expression Level of Assist:  Moderate Assist  Expression Description:  Set up of equipment, Extra time needed, Cueing needed  Social Interaction Level of Assist:  Modified Independent  Social Interaction Description:  Extra  "time needed  Problem Solving Level of Assist:  Moderate Assist  Problem Solving Description:  Cueing needed, Extra time needed  Memory Level of Assist:  Moderate Assist  Memory Description:  Cueing needed, Extra time needed  Barriers to Discharge Home:   Weakness, aphsia    Respiratory Therapy:  O2 (LPM): 0  O2 Delivery Device: None - Room Air    Case Management:  Continues to work on disposition and DME needs.      Discharge Date/Disposition:  4/3/25  _____________________________________   Encounter Date: 3/25/2025    Chief Complaint: Weakness    Interval Events (Subjective):  Seen in bed. Tolerating therapy. Mild bilateral swelling. Will order dopplers to rule out blood clots    Objective:  VITAL SIGNS: /70   Pulse (!) 51   Temp 36.4 °C (97.6 °F) (Oral)   Resp 18   Ht 1.854 m (6' 1\")   Wt 81.5 kg (179 lb 10.8 oz)   SpO2 94%   BMI 23.71 kg/m²   Gen: No acute distress, well developed well nourished adult  HEENT: Normal Cephalic Atraumatic, Normal conjunctiva.   CV: warm extremities, well perfused, 1+ edema  Resp: symmetric chest rise, breathing comfortably on room air  Abd: Soft, Non distended  Extremities: normal bulk, no atrophy  Skin: no visible rashes or lesions.   Neuro: alert, awake  Psych: Mood and affect appropriate and congruent    Laboratory Values:  Recent Results (from the past 72 hours)   EKG    Collection Time: 25  7:23 PM   Result Value Ref Range    Report       [facility]    Test Date:  2025  Pt Name:    SHABBIR MIRANDA           Department: [facility]  MRN:        [id number]                      Room:       [facility]  Gender:     Male                         Technician: [id number]  [facility]  :        1950                   Requested By:SAVITA GOODMAN  Order #:    [id number]                    Keshia MD: Bj Azul MD    Measurements  Intervals                                Axis  Rate:       57                           P:          51  NY:         193                         "  QRS:        70  QRSD:       107                          T:          60  QT:         422  QTc:        411    Interpretive Statements  Sinus bradycardia  Atrial premature complex  Borderline repolarization abnormality  Compared to ECG 03/21/2025 13:27:04  Atrial premature complex(es) now present  Sinus rhythm no longer present  Intraventricular conduction delay no longer present  ST (T wave) deviation no longer present  Electronically Signed On 03- 19:23:41 P DT by Bj Azul MD     CBC WITHOUT DIFFERENTIAL    Collection Time: 03/25/25  5:27 AM   Result Value Ref Range    WBC 10.8 4.8 - 10.8 K/uL    RBC 4.81 4.70 - 6.10 M/uL    Hemoglobin 14.8 14.0 - 18.0 g/dL    Hematocrit 43.9 42.0 - 52.0 %    MCV 91.3 81.4 - 97.8 fL    MCH 30.8 27.0 - 33.0 pg    MCHC 33.7 32.3 - 36.5 g/dL    RDW 44.1 35.9 - 50.0 fL    Platelet Count 260 164 - 446 K/uL    MPV 9.8 9.0 - 12.9 fL   Comp Metabolic Panel    Collection Time: 03/25/25  5:27 AM   Result Value Ref Range    Sodium 137 135 - 145 mmol/L    Potassium 4.3 3.6 - 5.5 mmol/L    Chloride 101 96 - 112 mmol/L    Co2 22 20 - 33 mmol/L    Anion Gap 14.0 7.0 - 16.0    Glucose 83 65 - 99 mg/dL    Bun 17 8 - 22 mg/dL    Creatinine 0.80 0.50 - 1.40 mg/dL    Calcium 8.4 (L) 8.5 - 10.5 mg/dL    Correct Calcium 8.8 8.5 - 10.5 mg/dL    AST(SGOT) 24 12 - 45 U/L    ALT(SGPT) 79 (H) 2 - 50 U/L    Alkaline Phosphatase 81 30 - 99 U/L    Total Bilirubin 0.5 0.1 - 1.5 mg/dL    Albumin 3.5 3.2 - 4.9 g/dL    Total Protein 5.9 (L) 6.0 - 8.2 g/dL    Globulin 2.4 1.9 - 3.5 g/dL    A-G Ratio 1.5 g/dL   ESTIMATED GFR    Collection Time: 03/25/25  5:27 AM   Result Value Ref Range    GFR (CKD-EPI) 92 >60 mL/min/1.73 m 2       Medications:  Scheduled Medications   Medication Dose Frequency    Pharmacy Consult Request  1 Each PHARMACY TO DOSE    senna-docusate  2 Tablet Q EVENING    omeprazole  20 mg DAILY    acetaminophen  1,000 mg TID    levETIRAcetam  500 mg BID     PRN medications: Respiratory  Therapy Consult, hydrALAZINE, senna-docusate **AND** polyethylene glycol/lytes, ondansetron **OR** ondansetron, traZODone, sodium chloride, butalbital/apap/caffeine, oxyCODONE immediate-release    Diet:  Current Diet Order   Procedures    Diet Order Diet: Regular (T-dine, meats cut up)       Medical Decision Making and Plan:  CVA:  Visual Agnosia  Right hemianopsia  Impaired attention  - Patient with L hemorraghic stroke side/type of stroke on 3/16 date. Etiology likely hypertensive. Received no intervention  - Continue ASA and statin for secondary stroke prophylaxis.  -on keppra 500mg BID  -EKG today show no qtc, consider aricept    Bilateral lower extremity edema  -3/25 - dopplers to rule out blood clot  -continue ambulation as much a possible  -elevate legs in bed    Leukocytosis -resolved  -likely due to prednisone     Cognitive Communicative deficits:  - Patient with significant cognitive deficits due to aphasia  - Environmental modifications to decrease excessive stimulation including turning off the lights  - Consider starting neurostimulants such Nuvigil, amantadine or methylphenidate  - SLP to evaluate and treat cognitive deficits.   -Neuropsych will follow and perform testing if/when appropriate.       Hemiparesis:  - Patient with R dom hemiparesis  - consider starting SSRI per FLAME trial to facilitate post-stroke motor recovery  - Continue aggressive therapies with PT and OT to strengthen and optimize function.     Spasticity:  - Patient at risk of tone in the affected limbs/muscles  - PT and OT to initiate aggressive stretching, ROM exercises, bracing, splinting, casting and positioning as appropriate.  - If the tone fails to improve with the above conservative measures, consider further intervention with botulinum toxin or phenol injections.     Dysphagia/Nutrition:  - Patient currently on dysphagia DIET.  - Continue therapies with SLP. SLP to perform swallow evaluation and provide oral motor exercises  if necessary.        Neurogenic bladder:  - Timed voids with PVR q4H x3. If PVR > 400mL or if patient is unable to void, straight cath patient.     Neurogenic bowel:  -  Colace, Senna BID on admission  - Goal of 1BM/day.  -Last BM: 03/20/25 (Per report)        Circadian Rhythm disorder:   -Trazadone 50mg PRN for restlessness after 10pm  Recommend lights on during the day/off at night, minimize nighttime interruptions as able.     Mood  - at risk of adjustment disorder, depression, and anxiety due to functional decline     ID:  - at risk for Urinary tract infection     Skin/Wounds:  - Pressure relief q2h while in bed. Close monitoring for signs of breakdown     Pain:  - Neuroceptic - On Tylenol prn, fiorecet, prednisone x 4 days completedContinue tylenol and fiorecet     DVT prophylaxis:  continues to be contraindicated. Dopplers ordered     GI prophylaxis:  On Prilosec 20mg daily         -Follow-up Neurology, PCP       ____________________________________    Vivek Klein MD  Physical Medicine & Rehabilitation   Brain Injury Medicine   ____________________________________    Total time:  60 minutes. Time spent included pre-rounding, review of vitals and tests, unit/floor time, face-to-face time with the patient including physical examination, care coordination, counseling of patient and/or family, ordering medications/procedures/tests, discussion with CM, PT, OT, SLP and/or other healthcare providers, and documentation in the electronic medical record. Topics discussed included dispostion.

## 2025-03-25 NOTE — CARE PLAN
"  Problem: Fall Risk - Rehab  Goal: Patient will remain free from falls  Note: [facility] Fall risk Assessment Score: 22    High fall risk Interventions   - Alarming seatbelt  - Bed and strip alarm   - Yellow sign by the door   - Yellow wrist band \"Fall risk\"  - Room near to the nurse station  - Do not leave patient unattended in the bathroom  - Fall risk education provided       Problem: Bowel Elimination  Goal: Patient will participate in bowel management program  Note: Scheduled senna given at hs.Continent of bowel per report.Offered prn bowel meds, pt refused.LBM 3/22.Will continue to monitor.         "

## 2025-03-25 NOTE — THERAPY
Speech Language Pathology  Daily Treatment     Patient Name:  Lico aCba  Age:  74 y.o., Sex:  male  Medical Record #:  [id number]  Today's Date: 3/25/2025    Precautions  Precautions: Fall Risk, Cognition/Communication  Fall Risk: Poor balance, Poor safety, Low vision, Visual spatial neglect/Neglect program  Cognition/Communication: Expressive Aphasia, Hearing Aids  Swallow: Therapeutic dining  Comments: R visual field cut    Subjective: Patient agreeable to therapy.     Objective:    03/25/25 1430   SLP Charge Group   Charges Yes   SLP Treatment - Individual Speech Language Treatment - Individual   SLP Total Time Spent   SLP Individual Total Time Spent (Mins) 30   Interdisciplinary Plan of Care Collaboration   IDT Collaboration with  Family / Caregiver   Patient Position at End of Therapy Seated;Tray Table within Reach;Chair Alarm On;Phone within Reach;Family / Friend in Room;Self Releasing Lap Belt Applied   Collaboration Comments Patient's son Yadiel accompanied patient to his session.         Expressive Language  Purpose: to improve confidence in verbal expression across different environments (i.e. home, work, school, social settings, etc.), to improve the ability to verbally express thoughts, needs, emotions, and ideas in everyday communication, and to provide education and training for caregivers and/or family members  Interventions:   Explains functions of objects  SFA (Semantic Feature Analysis)  Expressive language deficits characterized by: Perseverations, Word Finding Deficits (Anomia), and Semantic Paraphasias     Written Language Expression  Purpose: to improve the ability to express information through written communication and to support the development of functional writing skills (i.e. filling out forms, emails, lists, signing documents, etc.)  Interventions:   Dominant hand: Right  Copying  Barriers to Written Language Expression: Visual/perceptual deficit and  Perseveration    Assessment:     Patient continued with one SFA task, with max cues needed for isolation of individual element and for redirection from ( filler) speech- lacking content.   Patient worked to copy [facility] words pictured.  Patient was able to copy 3/7 items ( was able to name all 7 pictures with written word above it).  Perseveration was a factor ( taking letters from prior word ( elements of cat for dog) or using the repeating the first letter as the last letter ( Kek for key)   Patient benefited from assistance isolating one item at a time ( covered other items on page).  Patient also needed assistance bringing items on the right, into left field of view.    Strengths: Able to follow instructions, Pleasant and cooperative, Supportive family, Willingly participates in therapeutic activities, Motivated for self care and independence  Barriers: Aphasia expressive, Aphasia receptive, Impaired functional cognition, Visual impairment    Plan:  Target naming, use of semantic feature description.    Passport items to be completed:  Express basic needs, understand food/liquid recommendations, consistently follow swallow precautions, manage finances, manage medications, arrive to therapy appointments on time, complete daily memory log entries, solve problems related to safety situations, review education related to hospitalization, complete caregiver training     Speech Therapy Problems (Active)       Problem: Expression STGs       Dates: Start:  03/22/25         Goal: STG-Within one week, patient will       Dates: Start:  03/22/25       Description: 1) Individualized goal:  describe selected vocabulary by semantic features with 80% acc and MIN A.   2) Interventions:  SLP Speech Language Treatment, SLP Self Care / ADL Training , and SLP Group Treatment          Goal Note filed on 03/25/25 0808 by Alyse Anderson MS,CCC-SLP       Patient names pictures with 56% acc increasing to 80% with sentence completion cue.   Patient needs mod and occasionally max cues to implement use of semantic features to improve word finding.                 Problem: Memory STGs       Dates: Start:  03/22/25         Goal: STG-Within one week, patient will       Dates: Start:  03/22/25       Description: 1) Individualized goal:  complete working memory tasks w/ 80% accuracy and MIN A.   2) Interventions:  SLP Self Care / ADL Training , SLP Cognitive Skill Development, and SLP Group Treatment          Goal Note filed on 03/24/25 1512 by Alyse Anderson MS,CCC-SLP       To be addressed.                  Problem: Problem Solving STGs       Dates: Start:  03/22/25         Goal: STG-Within one week, patient will       Dates: Start:  03/22/25       Description: 1) Individualized goal:  complete right scanning/neglect tasks w/ MIN A and 80% acc.   2) Interventions:  SLP Self Care / ADL Training , SLP Cognitive Skill Development, and SLP Group Treatment          Goal Note filed on 03/24/25 1512 by Alyse Anderson MS,CCC-SLP       Patient needs min to mod cues for 80% acc in environ mental scans related to eating.  Continue goal.                 Problem: Social Interaction STGs       Dates: Start:  03/22/25         Goal: STG-Within one week, patient will       Dates: Start:  03/22/25               Problem: Speech/Swallowing LTGs       Dates: Start:  03/22/25         Goal: LTG-By discharge, patient will safely swallow       Dates: Start:  03/22/25       Description: 1) Individualized goal:  regular textures and thin liquids implementing safe swallow strategies in >90% of opportunities with MOD I for a safe D/C to PLOF.   2) Interventions:  SLP Swallowing Dysfunction Treatment, SLP Self Care / ADL Training , and SLP Group Treatment             Goal: LTG-By discharge, patient will solve basic problems       Dates: Start:  03/22/25       Description: 1) Individualized goal:  w/ 80% acc and MOD I for a safe D/C to PLOF.   2) Interventions:  SLP Speech Language  Treatment, SLP Self Care / ADL Training , SLP Cognitive Skill Development, and SLP Group Treatment                Problem: Swallowing STGs       Dates: Start:  03/22/25         Goal: STG-Within one week, patient will       Dates: Start:  03/22/25

## 2025-03-25 NOTE — CARE PLAN
Problem: Mobility  Goal: STG-Within one week, patient will ambulate 300ft SBA no device with moderate  verbal cueing to scan right  Outcome: Progressing  Note: SBA-CGA     Problem: Mobility Transfers  Goal: STG-Within one week, patient will perform bed mobility independently  Outcome: Met  Goal: STG-Within one week, patient will transfer bed to chair SBA  Outcome: Met

## 2025-03-25 NOTE — CARE PLAN
Problem: Expression STGs  Goal: STG-Within one week, patient will  Description: 1) Individualized goal:  describe selected vocabulary by semantic features with 80% acc and MIN A.   2) Interventions:  SLP Speech Language Treatment, SLP Self Care / ADL Training , and SLP Group Treatment      Outcome: Not Met  Note: Patient names pictures with 56% acc increasing to 80% with sentence completion cue.  Patient needs mod and occasionally max cues to implement use of semantic features to improve word finding.

## 2025-03-25 NOTE — PROGRESS NOTES
NURSING DAILY NOTE    Name: Lico Caba   Date of Admission: 3/21/2025   Admitting Diagnosis: Intracranial hemorrhage, spontaneous intraparenchymal, idiopathic, acute (HCC)  Attending Physician: SAVITA GOODMAN M.D.  Allergies: Patient has no known allergies.    Safety  Patient Assist  cga  Patient Precautions  Precautions: Fall Risk, Cognition/Communication  Fall Risk: Poor balance, Poor safety, Impulsive, Low vision, Visual spatial neglect/Neglect program  Cognition/Communication: Confused, Expressive Aphasia, Receptive Aphasia, Hearing Aids  Swallow: Therapeutic dining (T-dine for self feeding d/t R neglect/field cut)  Comments: R visual field cut  Bed Transfer Status  Contact Guard Assist  Toilet Transfer Status   Minimal Assist  Assistive Devices  Rails, Wheelchair  Oxygen  None - Room Air  Diet/Therapeutic Dining  Current Diet Order   Procedures    Diet Order Diet: Regular (T-dine, meats cut up)     Pill Administration  whole  Agitated Behavioral Scale  17  ABS Level of Severity  No Agitation    Fall Risk  Has the patient had a fall this admission?      Genesis Elizondo Fall Risk Scoring  22, HIGH RISK  Fall Risk Safety Measures  bed alarm, chair alarm, seatbelt alarm, poor balance, and low vision/hearing    Vitals  Temperature: 36.5 °C (97.7 °F)  Temp src: Oral  Pulse: 64  Respiration: 18  Blood Pressure : 126/74  Blood Pressure MAP (Calculated): 91 MM HG  BP Location: Right, Upper Arm  Patient BP Position: Sitting     Oxygen  Pulse Oximetry: 95 %  O2 (LPM): 0  O2 Delivery Device: None - Room Air    Bowel and Bladder  Last Bowel Movement  03/22/25  Stool Type     Bowel Device     Continent  Bladder: Did not void   Bowel: No movement  Bladder Function  Urine Void (mL): 200 ml  Number of Times Voided: 1  Urine Color: Yellow  Genitourinary Assessment   Bladder Assessment (WDL):  Within Defined Limits  Urine Color: Yellow  Bladder Device: Bathroom, Urinal  Bladder Scan: Post Void  $ Bladder Scan  Results (mL): 34    Skin  Dereje Score   19  Sensory Interventions   Bed Types: Standard/Trauma Mattress  Skin Preventative Measures: Pillows in Use for Support / Positioning  Moisture Interventions         Pain  Pain Rating Scale  0 - No Pain  Pain Location  Head  Pain Location Orientation  Inner  Pain Interventions   Declines    ADLs    Bathing   Shower, Staff, Family / Significant Other  Linen Change   Partial  Personal Hygiene     Chlorhexidine Bath      Oral Care     Teeth/Dentures     Shave     Nutrition Percentage Eaten  Lunch, Between % Consumed (90%)  Environmental Precautions  Treaded Slipper Socks on Patient, Personal Belongings, Wastebasket, Call Bell etc. in Easy Reach, Bed in Low Position  Patient Turns/Positioning  Patient turns self independently side to side without assistance, to offload sacral area  Patient Turns Assistance/Tolerance     Bed Positions  Bed Controls On, Bed Locked  Head of Bed Elevated  Self regulated      Psychosocial/Neurologic Assessment  Psychosocial Assessment  Psychosocial (WDL):  WDL Except  Patient Behaviors: Forgetful  Family Behaviors: No Family Present  Neurologic Assessment  Neuro (WDL): Exceptions to WDL  Level of Consciousness: Alert  Orientation Level: Disoriented to time  Cognition: Follows commands, Poor attention/concentration, Appropriate safety awareness  Speech: Slurred  Motor Function/Sensation Assessment: Motor strength  Muscle Strength Right Arm: Good Strength Against Gravity and Moderate Resistance  Muscle Strength Left Arm: Good Strength Against Gravity and Moderate Resistance  Muscle Strength Right Leg: Good Strength Against Gravity and Moderate Resistance  Muscle Strength Left Leg: Good Strength Against Gravity and Moderate Resistance  EENT (WDL):  WDL Except    Cardio/Pulmonary Assessment  Edema      Respiratory Breath Sounds  RUL Breath Sounds: Clear  RML Breath Sounds: Clear  RLL Breath Sounds: Diminished  ZACH Breath Sounds: Clear  LLL Breath  Sounds: Diminished  Cardiac Assessment   Cardiac (WDL):  Within Defined Limits

## 2025-03-25 NOTE — CARE PLAN
The patient is Stable - Low risk of patient condition declining or worsening    Shift Goals  Clinical Goals: Safety  Patient Goals: Safety    Progress made toward(s) clinical / shift goals:    Problem: Hemodynamics  Goal: Patient's hemodynamics, fluid balance and neurologic status will be stable or improve  Outcome: Progressing  Note:    Latest Reference Range & Units 03/25/25 05:27   WBC 4.8 - 10.8 K/uL 10.8   RBC 4.70 - 6.10 M/uL 4.81   Hemoglobin 14.0 - 18.0 g/dL 14.8   Hematocrit 42.0 - 52.0 % 43.9   MCV 81.4 - 97.8 fL 91.3   MCH 27.0 - 33.0 pg 30.8   MCHC 32.3 - 36.5 g/dL 33.7   RDW 35.9 - 50.0 fL 44.1   Platelet Count 164 - 446 K/uL 260   MPV 9.0 - 12.9 fL 9.8   Sodium 135 - 145 mmol/L 137   Potassium 3.6 - 5.5 mmol/L 4.3   Chloride 96 - 112 mmol/L 101   Co2 20 - 33 mmol/L 22   Anion Gap 7.0 - 16.0  14.0   Glucose 65 - 99 mg/dL 83   Bun 8 - 22 mg/dL 17   Creatinine 0.50 - 1.40 mg/dL 0.80   GFR (CKD-EPI) >60 mL/min/1.73 m 2 92   Calcium 8.5 - 10.5 mg/dL 8.4 (L)   Correct Calcium 8.5 - 10.5 mg/dL 8.8   AST(SGOT) 12 - 45 U/L 24   ALT(SGPT) 2 - 50 U/L 79 (H)   Alkaline Phosphatase 30 - 99 U/L 81   Total Bilirubin 0.1 - 1.5 mg/dL 0.5   Albumin 3.2 - 4.9 g/dL 3.5   Total Protein 6.0 - 8.2 g/dL 5.9 (L)   Globulin 1.9 - 3.5 g/dL 2.4   A-G Ratio g/dL 1.5   (L): Data is abnormally low  (H): Data is abnormally high       Patient is not progressing towards the following goals:

## 2025-03-25 NOTE — THERAPY
Recreational Therapy   Initial Evaluation     Patient Name:  Lico Caba  Age:  74 y.o., Sex:  male  Medical Record #: [id number]  Today's Date: 3/25/2025     Subjective: Patient was agreeable to participate in assessment interview outside.     Objective:    03/25/25 0901   Treatment Time   Total Time Spent (mins) 30   Precautions   Precautions Fall Risk;Cognition/Communication   Fall Risk Poor balance;Poor safety;Low vision;Visual spatial neglect/Neglect program   Cognition/Communication Expressive Aphasia;Hearing Aids   Swallow Therapeutic dining   Comments R visual field cut   Prior Living Arrangements   Lives with - Patient's Self Care Capacity Spouse   Steps Into Home 1   Steps In Home 0   Ambulation Independent   Assistive Devices Used None   Driving / Transportation Driving Independent   Leisure History   Physical Leisure Interests/Involvement Cycling;Hiking;Other (comments)  (Water Volleyball, golf, snowshoe,  and kite flying.)   Cognitive Leisure Interests/Involvement Reading  ([employer])   Social Leisure Interests/Involvement Family Events  (golf with friends, karaoke, etc.)   Creative Leisure Interests/Involvement   (None noted)   Emotional/Behavioral Skills   Coping Skills Reading  (Music and relaxing)   Types of Social Support Family;Friends;Community   Leisure Competence Measure   Leisure Awareness Complete [place]   Leisure Attitude Complete [place]   Leisure Skills Complete [place]   Social Appropriateness Complete [place]   Group Interaction Skills Complete [place]   Social Contact Complete [place]   Community Participation Complete [place]   Perceived Leisure/Life   Satisfaction Satisfied   Clinical Impression   Clinical Impression Other (Comments)  (Right neglect)   Interdisciplinary Plan of Care Collaboration   IDT Collaboration with  Family / Caregiver   Patient Position at End of Therapy Seated;Family / Friend in Room   Collaboration Comments spouse  in session   Strengths & Barriers   Strengths Able to follow instructions;Alert and oriented;Independent prior level of function;Motivated for self care and independence;Pleasant and cooperative;Supportive family;Willingly participates in therapeutic activities   Barriers Aphasia expressive;Visual impairment  (right neglect, short term memory deficits.)       Patient has been educated on the following items: rec therapy role and rec therapy plan of care    Assessment  Patient is 74 y.o. male with a diagnosis of Intracranial hemorrhage, spontaneous intraparenchymal, idiopathic, acute (HCC) [I62.9].     Patient was an avid cyclist. He said he also enjoys hiking snow shoeing, playing water volleyball, and flying his kite. They live in an active community and get involved in many hosted events such as karaoke, dominos, etc. He also enjoys reading and has some friends he golf with. He spends most of his time with his wife but they also have many friends. He identified reading and listening to music as what he does to cope. Patient reports that he is happy with the way he spends his time. He was open to working on visual scanning in Recreation Therapy.     Currently, the patient has the following precautions: Fall Risk: Poor balance, Poor safety, Low vision, Visual spatial neglect/Neglect program, Cognition/Communication: Expressive Aphasia, Hearing Aids, Swallow: Therapeutic dining, Comments: R visual field cut    Patient's PMH includes:   Past Medical History:   Diagnosis Date    Arthritis     back right leg    Bowel habit changes     constipation    Heart murmur     states he was told he had a murmur with in the service    Pain 01/04/2022    pain 10/10    Snoring      Patient's Past Surgical History:   Past Surgical History:   Procedure Laterality Date    PB LAMINOTOMY,LUMBAR DISK,1 INTRSP  1/5/2022    Procedure: L3/4 and L4/5  decompressive laminectomy and right L3/4 discectomy, L3/4 and L4/5 bilateral foraminotomies;   Surgeon: Allyson Sutton M.D.;  Location: SURGERY [facility];  Service: Orthopedics    LUMBAR DECOMPRESSION  1/5/2022    Procedure: DECOMPRESSION, SPINE, LUMBAR;  Surgeon: Allyson Sutton M.D.;  Location: SURGERY [facility];  Service: Orthopedics    FORAMINOTOMY  1/5/2022    Procedure: FORAMINOTOMY, SPINE;  Surgeon: Allyson Sutton M.D.;  Location: SURGERY [facility];  Service: Orthopedics    OTHER      tonsils       Recreation Therapy evaluation performed today; performance at today's assessment is as above. At baseline, the patient was independent with leisure activities. The patient is limited by the following barriers: Aphasia expressive, Visual impairment (right neglect, short term memory deficits.)    The patient is performing below their prior level of functioning and will benefit from recreation therapy services to work on scanning to the right.    Plan  Recommend Recreational Therapy 30-60 minutes per day 2-3 days per week for 2 weeks for the following treatments:  Leisure Skills Development    Passport items to be completed:  Verbalize two positive leisure activities, discuss returning to work, hobbies, community groups or volunteer activities, explore community resources     Goals:  Long term and short term goals have been discussed with patient and spouse and they are in agreement.    Recreation Therapy Problems (Active)       Problem: Recreation Therapy       Dates: Start:  03/25/25         Goal: STG-Within one week, patient will scan to the right in order to successfully play a board game, with moderate prompts to look to the right.        Dates: Start:  03/25/25            Goal: LTG-By discharge, patient will scan to the right in order to successfully play a board game, with minimal prompts to look to the right.        Dates: Start:  03/25/25

## 2025-03-25 NOTE — THERAPY
"Speech Language Pathology  Daily Treatment     Patient Name:  Lico Caba  Age:  74 y.o., Sex:  male  Medical Record #:  [id number]  Today's Date: 3/25/2025    Precautions  Precautions: Fall Risk, Cognition/Communication  Fall Risk: Poor balance, Poor safety, Low vision, Visual spatial neglect/Neglect program  Cognition/Communication: Expressive Aphasia  Swallow: Therapeutic dining  Comments: R visual field cut    Subjective: Pt pleasant and agreeable, verbalizing motivation to participate in therapies and \"not give up.\"      Objective:    03/25/25 1304   SLP Charge Group   Charges Yes   SLP Treatment - Individual Speech Language Treatment - Individual   SLP Total Time Spent   SLP Individual Total Time Spent (Mins) 60   Precautions   Precautions Fall Risk;Cognition/Communication   Cognition/Communication Expressive Aphasia   Cognition    Cognition / Consciousness X   Speech/ Communication Expressive Aphasia;Word Finding Impairment   Level of Consciousness Alert   Interdisciplinary Plan of Care Collaboration   IDT Collaboration with  Speech Therapist;Family / Caregiver   Patient Position at End of Therapy Seated;Family / Friend in Room;Chair Alarm On;Self Releasing Lap Belt Applied   Collaboration Comments Pt visiting with family upon arrival. Discussed CLOF and POC with ST.         Expressive Language  Purpose: to improve confidence in verbal expression across different environments (i.e. home, work, school, social settings, etc.), to improve the ability to verbally express thoughts, needs, emotions, and ideas in everyday communication, to improve the use of appropriate vocabulary, sentence structure, and grammar, and to utilize compensatory strategies to effectively communicate  Interventions:   SFA (Semantic Feature Analysis)  Expressive language deficits characterized by: Perseverations, Word Finding Deficits (Anomia), and empty speech, stereotypies        Assessment:     Introduced and trained use of " "semantic feature analysis (SFA) to aid in word finding deficits. Pt receptive to introduction of strategies, however, needed repetition and verbal cueing to carryover when practicing. Pt's speech characterized by anomia, as well as repetitive stereotyped empty speech lacking content words (i.e. \"well it's important because it's important to have and important to me and it's important that I do this\"). Pt required MAX cueing initially to follow SFA hierarchy, benefiting from choices to correctly complete hierarchy. Demonstrated the most difficulty with category, description, and location. Most success observed while describing function (e.g. \"cleans my sneeze\" for tissue, \"goes dark to light\" for lamp), however, pt still required MAX/MOD cues to focus on conveying meaningful info/content words and eliminate filler.     Strengths: Able to follow instructions, Pleasant and cooperative, Supportive family, Willingly participates in therapeutic activities, Motivated for self care and independence  Barriers: Aphasia expressive, Aphasia receptive, Impaired functional cognition, Visual impairment    Plan:  Continue targeting functional expressive language.     Passport items to be completed:  Express basic needs, understand food/liquid recommendations, consistently follow swallow precautions, manage finances, manage medications, arrive to therapy appointments on time, complete daily memory log entries, solve problems related to safety situations, review education related to hospitalization, complete caregiver training     Speech Therapy Problems (Active)       Problem: Expression STGs       Dates: Start:  03/22/25         Goal: STG-Within one week, patient will       Dates: Start:  03/22/25       Description: 1) Individualized goal:  describe selected vocabulary by semantic features with 80% acc and MIN A.   2) Interventions:  SLP Speech Language Treatment, SLP Self Care / ADL Training , and SLP Group Treatment          Goal " Note filed on 03/25/25 0808 by Alyse Anderson MS,CCC-SLP       Patient names pictures with 56% acc increasing to 80% with sentence completion cue.  Patient needs mod and occasionally max cues to implement use of semantic features to improve word finding.                 Problem: Memory STGs       Dates: Start:  03/22/25         Goal: STG-Within one week, patient will       Dates: Start:  03/22/25       Description: 1) Individualized goal:  complete working memory tasks w/ 80% accuracy and MIN A.   2) Interventions:  SLP Self Care / ADL Training , SLP Cognitive Skill Development, and SLP Group Treatment          Goal Note filed on 03/24/25 1512 by Alyse Anderson MS,CCC-SLP       To be addressed.                  Problem: Problem Solving STGs       Dates: Start:  03/22/25         Goal: STG-Within one week, patient will       Dates: Start:  03/22/25       Description: 1) Individualized goal:  complete right scanning/neglect tasks w/ MIN A and 80% acc.   2) Interventions:  SLP Self Care / ADL Training , SLP Cognitive Skill Development, and SLP Group Treatment          Goal Note filed on 03/24/25 1512 by Alyse Anderson MS,CCC-SLP       Patient needs min to mod cues for 80% acc in environ mental scans related to eating.  Continue goal.                 Problem: Social Interaction STGs       Dates: Start:  03/22/25         Goal: STG-Within one week, patient will       Dates: Start:  03/22/25               Problem: Speech/Swallowing LTGs       Dates: Start:  03/22/25         Goal: LTG-By discharge, patient will safely swallow       Dates: Start:  03/22/25       Description: 1) Individualized goal:  regular textures and thin liquids implementing safe swallow strategies in >90% of opportunities with MOD I for a safe D/C to PLOF.   2) Interventions:  SLP Swallowing Dysfunction Treatment, SLP Self Care / ADL Training , and SLP Group Treatment             Goal: LTG-By discharge, patient will solve basic problems       Dates:  Start:  03/22/25       Description: 1) Individualized goal:  w/ 80% acc and MOD I for a safe D/C to PLOF.   2) Interventions:  SLP Speech Language Treatment, SLP Self Care / ADL Training , SLP Cognitive Skill Development, and SLP Group Treatment                Problem: Swallowing STGs       Dates: Start:  03/22/25         Goal: STG-Within one week, patient will       Dates: Start:  03/22/25

## 2025-03-26 ENCOUNTER — APPOINTMENT (OUTPATIENT)
Dept: PHYSICAL THERAPY | Facility: REHABILITATION | Age: 75
DRG: 057 | End: 2025-03-26
Attending: PHYSICAL MEDICINE & REHABILITATION
Payer: COMMERCIAL

## 2025-03-26 ENCOUNTER — APPOINTMENT (OUTPATIENT)
Dept: CARDIOLOGY | Facility: REHABILITATION | Age: 75
DRG: 057 | End: 2025-03-26
Attending: PHYSICAL MEDICINE & REHABILITATION
Payer: COMMERCIAL

## 2025-03-26 ENCOUNTER — APPOINTMENT (OUTPATIENT)
Dept: SPEECH THERAPY | Facility: REHABILITATION | Age: 75
DRG: 057 | End: 2025-03-26
Attending: PHYSICAL MEDICINE & REHABILITATION
Payer: COMMERCIAL

## 2025-03-26 ENCOUNTER — APPOINTMENT (OUTPATIENT)
Dept: PHYSICAL MEDICINE AND REHAB | Facility: REHABILITATION | Age: 75
DRG: 057 | End: 2025-03-26
Attending: PHYSICAL MEDICINE & REHABILITATION
Payer: COMMERCIAL

## 2025-03-26 ENCOUNTER — APPOINTMENT (OUTPATIENT)
Dept: OCCUPATIONAL THERAPY | Facility: REHABILITATION | Age: 75
DRG: 057 | End: 2025-03-26
Attending: PHYSICAL MEDICINE & REHABILITATION
Payer: COMMERCIAL

## 2025-03-26 PROCEDURE — 700102 HCHG RX REV CODE 250 W/ 637 OVERRIDE(OP): Performed by: PHYSICAL MEDICINE & REHABILITATION

## 2025-03-26 PROCEDURE — 99232 SBSQ HOSP IP/OBS MODERATE 35: CPT | Performed by: STUDENT IN AN ORGANIZED HEALTH CARE EDUCATION/TRAINING PROGRAM

## 2025-03-26 PROCEDURE — A9270 NON-COVERED ITEM OR SERVICE: HCPCS | Performed by: PHYSICAL MEDICINE & REHABILITATION

## 2025-03-26 PROCEDURE — 90791 PSYCH DIAGNOSTIC EVALUATION: CPT | Performed by: STUDENT IN AN ORGANIZED HEALTH CARE EDUCATION/TRAINING PROGRAM

## 2025-03-26 PROCEDURE — 92507 TX SP LANG VOICE COMM INDIV: CPT

## 2025-03-26 PROCEDURE — 770010 HCHG ROOM/CARE - REHAB SEMI PRIVAT*

## 2025-03-26 PROCEDURE — 97116 GAIT TRAINING THERAPY: CPT

## 2025-03-26 PROCEDURE — 97530 THERAPEUTIC ACTIVITIES: CPT

## 2025-03-26 PROCEDURE — 97535 SELF CARE MNGMENT TRAINING: CPT

## 2025-03-26 RX ORDER — ACETAMINOPHEN 500 MG
1000 TABLET ORAL EVERY 6 HOURS PRN
Status: DISCONTINUED | OUTPATIENT
Start: 2025-03-26 | End: 2025-04-03 | Stop reason: HOSPADM

## 2025-03-26 RX ADMIN — OMEPRAZOLE 20 MG: 20 CAPSULE, DELAYED RELEASE ORAL at 08:44

## 2025-03-26 RX ADMIN — LEVETIRACETAM 500 MG: 500 TABLET, FILM COATED ORAL at 19:56

## 2025-03-26 RX ADMIN — BUTALBITAL, ACETAMINOPHEN, AND CAFFEINE 1 TABLET: 325; 50; 40 TABLET ORAL at 22:00

## 2025-03-26 RX ADMIN — ACETAMINOPHEN 1000 MG: 500 TABLET ORAL at 08:43

## 2025-03-26 RX ADMIN — LEVETIRACETAM 500 MG: 500 TABLET, FILM COATED ORAL at 08:43

## 2025-03-26 RX ADMIN — SENNOSIDES AND DOCUSATE SODIUM 2 TABLET: 50; 8.6 TABLET ORAL at 19:56

## 2025-03-26 NOTE — DISCHARGE PLANNING
CM//Discharge :    The following has been ordered:   Home health:  [facility]                     Disciplines ordered: RN, PT, OT, SLP, Aid  Status: Sent

## 2025-03-26 NOTE — PROGRESS NOTES
NURSING DAILY NOTE    Name: Lico Caba   Date of Admission: 3/21/2025   Admitting Diagnosis: Intracranial hemorrhage, spontaneous intraparenchymal, idiopathic, acute ([facility])  Attending Physician: SAVITA GOODMAN M.D.  Allergies: Patient has no known allergies.    Safety  Patient Assist  cga  Patient Precautions  Precautions: Fall Risk, Cognition/Communication  Fall Risk: Poor balance, Poor safety, Low vision, Visual spatial neglect/Neglect program  Cognition/Communication: Expressive Aphasia  Swallow: Therapeutic dining  Comments: R visual field cut  Bed Transfer Status  Stand By Assist  Toilet Transfer Status   Minimal Assist  Assistive Devices  Rails, Wheelchair  Oxygen  None - Room Air  Diet/Therapeutic Dining  Current Diet Order   Procedures    Diet Order Diet: Regular (T-dine, meats cut up)     Pill Administration  whole  Agitated Behavioral Scale  17  ABS Level of Severity  No Agitation    Fall Risk  Has the patient had a fall this admission?      Genesis Elizondo Fall Risk Scoring  21, HIGH RISK  Fall Risk Safety Measures  bed alarm, chair alarm, and seatbelt alarm    Vitals  Temperature: 36.5 °C (97.7 °F)  Temp src: Oral  Pulse: 66  Respiration: 18  Blood Pressure : 138/77  Blood Pressure MAP (Calculated): 97 MM HG  BP Location: Left, Upper Arm  Patient BP Position: Davis's Position     Oxygen  Pulse Oximetry: 96 %  O2 (LPM): 0  O2 Delivery Device: None - Room Air    Bowel and Bladder  Last Bowel Movement  03/25/25  Stool Type     Bowel Device  Bathroom  Continent  Bladder: Did not void   Bowel: No movement  Bladder Function  Urine Void (mL): 600 ml  Number of Times Voided: 1  Urine Color: Yellow  Genitourinary Assessment   Bladder Assessment (WDL):  Within Defined Limits  Urine Color: Yellow  Bladder Device: Bathroom, Urinal  Bladder Scan: Post Void  $ Bladder Scan Results (mL): 34    Skin  Dereje Score   19  Sensory Interventions   Bed Types: Standard/Trauma Mattress  Skin Preventative Measures:  Pillows in Use for Support / Positioning  Moisture Interventions         Pain  Pain Rating Scale  0 - No Pain  Pain Location  Head  Pain Location Orientation  Inner  Pain Interventions   Declines    ADLs    Bathing   Shower, Staff, Family / Significant Other  Linen Change   Partial  Personal Hygiene     Chlorhexidine Bath      Oral Care     Teeth/Dentures     Shave     Nutrition Percentage Eaten  Dinner, Between % Consumed  Environmental Precautions  Treaded Slipper Socks on Patient, Personal Belongings, Wastebasket, [place] etc. in Easy Reach, Bed in Low Position  Patient Turns/Positioning  Patient turns self independently side to side without assistance, to offload sacral area  Patient Turns Assistance/Tolerance  Standby Assist  Bed Positions  Bed Controls On, Bed Locked  Head of Bed Elevated  Self regulated      Psychosocial/Neurologic Assessment  Psychosocial Assessment  Psychosocial (WDL):  WDL Except  Patient Behaviors: Forgetful  Family Behaviors: No Family Present  Neurologic Assessment  Neuro (WDL): Exceptions to WDL  Level of Consciousness: Alert  Orientation Level: Disoriented to time  Cognition: Follows commands, Poor attention/concentration, Appropriate safety awareness  Speech: Clear  Pupil Assesment: No  Motor Function/Sensation Assessment: Motor strength  Muscle Strength Right Arm: Good Strength Against Gravity and Moderate Resistance  Muscle Strength Left Arm: Good Strength Against Gravity and Moderate Resistance  Muscle Strength Right Leg: Good Strength Against Gravity and Moderate Resistance  Muscle Strength Left Leg: Good Strength Against Gravity and Moderate Resistance  EENT (WDL):  WDL Except    Cardio/Pulmonary Assessment  Edema      Respiratory Breath Sounds  RUL Breath Sounds: Clear  RML Breath Sounds: Clear  RLL Breath Sounds: Clear, Diminished  ZACH Breath Sounds: Clear  LLL Breath Sounds: Clear, Diminished  Cardiac Assessment   Cardiac (WDL):  Within Defined Limits

## 2025-03-26 NOTE — PROGRESS NOTES
"  Physical Medicine & Rehabilitation Progress Note  _____________________________________  Interdisciplinary Team Conference   Most recent IDT on 3/25/2025     Discharge Date/Disposition:  4/3/25  _____________________________________   Encounter Date: 3/26/2025    Chief Complaint: Weakness    Interval Events (Subjective):  VS WNL.  Last BM 3/25. Voiding urine.   No new labs today.  Missed doses of scheduled meds: tylenol, omeprazole.   PRNs used: miralax    Patient seen at bedside today. Tolerating therapy. Appetite okay. Sleeping well. No pain currently, but does get headaches sometimes. Wants to change his tylenol from scheduled to prn.     Recent Therapy levels:   PT: bed mobility independent, Sit to Stand Stand By Assist, Chair/Bed to Chair Stand By Assist, ambulating [id number] min A.  OT: shower/bathing Minimal Assist, UB dressing Stand by Assist, LB dressing CGA    Objective:  VITAL SIGNS: /74   Pulse 62   Temp 37 °C (98.6 °F) (Temporal)   Resp 18   Ht 1.854 m (6' 1\")   Wt 81.5 kg (179 lb 10.8 oz)   SpO2 98%   BMI 23.71 kg/m²   Gen: No acute distress, well developed well nourished adult  HEENT: Normal Cephalic Atraumatic, Normal conjunctiva.   CV: warm extremities, well perfused  Resp: symmetric chest rise, breathing comfortably on room air  Abd: Soft, Non distended  Extremities: normal bulk, no atrophy  Skin: no visible rashes or lesions.   Neuro: alert, awake  Psych: Mood and affect appropriate and congruent    Laboratory Values:  Recent Results (from the past 72 hours)   EKG    Collection Time: 25  7:23 PM   Result Value Ref Range    Report       [facility]    Test Date:  2025  Pt Name:    SHABBIR MIRANDA           Department: [facility]  MRN:        [id number]                      Room:       [facility]  Gender:     Male                         Technician: [id number]  [facility]  :        1950                   Requested By:SAVITA GOODMAN  Order #:    [id number]                    Keshia MD: " Bj Azul MD    Measurements  Intervals                                Axis  Rate:       57                           P:          51  WA:         193                          QRS:        70  QRSD:       107                          T:          60  QT:         422  QTc:        411    Interpretive Statements  Sinus bradycardia  Atrial premature complex  Borderline repolarization abnormality  Compared to ECG 03/21/2025 13:27:04  Atrial premature complex(es) now present  Sinus rhythm no longer present  Intraventricular conduction delay no longer present  ST (T wave) deviation no longer present  Electronically Signed On 03- 19:23:41 P DT by Bj Azul MD     CBC WITHOUT DIFFERENTIAL    Collection Time: 03/25/25  5:27 AM   Result Value Ref Range    WBC 10.8 4.8 - 10.8 K/uL    RBC 4.81 4.70 - 6.10 M/uL    Hemoglobin 14.8 14.0 - 18.0 g/dL    Hematocrit 43.9 42.0 - 52.0 %    MCV 91.3 81.4 - 97.8 fL    MCH 30.8 27.0 - 33.0 pg    MCHC 33.7 32.3 - 36.5 g/dL    RDW 44.1 35.9 - 50.0 fL    Platelet Count 260 164 - 446 K/uL    MPV 9.8 9.0 - 12.9 fL   Comp Metabolic Panel    Collection Time: 03/25/25  5:27 AM   Result Value Ref Range    Sodium 137 135 - 145 mmol/L    Potassium 4.3 3.6 - 5.5 mmol/L    Chloride 101 96 - 112 mmol/L    Co2 22 20 - 33 mmol/L    Anion Gap 14.0 7.0 - 16.0    Glucose 83 65 - 99 mg/dL    Bun 17 8 - 22 mg/dL    Creatinine 0.80 0.50 - 1.40 mg/dL    Calcium 8.4 (L) 8.5 - 10.5 mg/dL    Correct Calcium 8.8 8.5 - 10.5 mg/dL    AST(SGOT) 24 12 - 45 U/L    ALT(SGPT) 79 (H) 2 - 50 U/L    Alkaline Phosphatase 81 30 - 99 U/L    Total Bilirubin 0.5 0.1 - 1.5 mg/dL    Albumin 3.5 3.2 - 4.9 g/dL    Total Protein 5.9 (L) 6.0 - 8.2 g/dL    Globulin 2.4 1.9 - 3.5 g/dL    A-G Ratio 1.5 g/dL   ESTIMATED GFR    Collection Time: 03/25/25  5:27 AM   Result Value Ref Range    GFR (CKD-EPI) 92 >60 mL/min/1.73 m 2       Medications:  Scheduled Medications   Medication Dose Frequency    Pharmacy Consult Request  1  Each PHARMACY TO DOSE    senna-docusate  2 Tablet Q EVENING    omeprazole  20 mg DAILY    acetaminophen  1,000 mg TID    levETIRAcetam  500 mg BID     PRN medications: Respiratory Therapy Consult, hydrALAZINE, senna-docusate **AND** polyethylene glycol/lytes, ondansetron **OR** ondansetron, traZODone, sodium chloride, butalbital/apap/caffeine, oxyCODONE immediate-release    Diet:  Current Diet Order   Procedures    Diet Order Diet: Regular (T-dine, meats cut up)       Medical Decision Making and Plan:  CVA:  Visual Agnosia  Right hemianopsia  Impaired attention  -Patient with L hemorraghic stroke side/type of stroke on 3/16 date. Etiology likely hypertensive. Received no intervention  -Continue ASA and statin for secondary stroke prophylaxis.  -on keppra 500mg BID  -EKG 3/24 show no qtc, consider aricept    Bilateral lower extremity edema  -3/25 - dopplers to rule out blood clot  -continue ambulation as much a possible  -elevate legs in bed    Leukocytosis -resolved  -likely due to prednisone     Cognitive Communicative deficits:  -patient with significant aphasia, poor word finding, and cognitive impairment.   -Environmental modifications to decrease excessive stimulation including turning off the lights  -Consider starting neurostimulants such Nuvigil, amantadine or methylphenidate  -SLP to evaluate and treat cognitive deficits.   -Neuropsych will follow and perform testing if/when appropriate.     Hemiparesis:  -Patient with R dom hemiparesis  -consider starting SSRI per FLAME trial to facilitate post-stroke motor recovery  -Continue aggressive therapies with PT and OT to strengthen and optimize function.     Spasticity:  -Patient at risk of tone in the affected limbs/muscles  -PT and OT to initiate aggressive stretching, ROM exercises, bracing, splinting, casting and positioning as appropriate.  -If the tone fails to improve with the above conservative measures, consider further intervention with botulinum toxin  or phenol injections.     Dysphagia/Nutrition:  -Patient currently on dysphagia DIET.  -Continue therapies with SLP. SLP to perform swallow evaluation and provide oral motor exercises if necessary.     Neurogenic bladder:  -Timed voids with PVR q4H x3. If PVR > 400mL or if patient is unable to void, straight cath patient.     Neurogenic bowel:  -Colace, Senna BID on admission  -Goal of 1BM/day.  -Last BM: 03/25     Circadian Rhythm disorder:   -Trazadone 50mg PRN for restlessness after 10pm  Recommend lights on during the day/off at night, minimize nighttime interruptions as able.     Mood  -at risk of adjustment disorder, depression, and anxiety due to functional decline     ID:  -at risk for Urinary tract infection     Skin/Wounds:  -Pressure relief q2h while in bed. Close monitoring for signs of breakdown     Pain:  - Neuroceptic - On Tylenol prn, fiorecet, prednisone x 4 days completed. Continue tylenol and fiorecet. 3/26: change tylenol to prn per patient preference.      DVT prophylaxis:  continues to be contraindicated. Dopplers ordered     GI prophylaxis:  On Prilosec 20mg daily         -Follow-up Neurology, PCP    ____________________________________    Ezekiel Alvarez D.O.  Physical Medicine & Rehabilitation   ____________________________________    Total time:  42 minutes.

## 2025-03-26 NOTE — THERAPY
Speech Language Pathology  Daily Treatment     Patient Name:  Lico Caba  Age:  74 y.o., Sex:  male  Medical Record #:  [id number]  Today's Date: 3/26/2025    Precautions  Precautions: Fall Risk, Cognition/Communication  Fall Risk: Poor balance, Low vision, Visual spatial neglect/Neglect program  Cognition/Communication: Expressive Aphasia  Swallow: Therapeutic dining  Comments: R visual field cut    Subjective: The pt was found seated in his room with wife and son present and was agreeable to ST session. SO stated that the pt benefits from lyrical cues for word finding difficulties.      Objective:    03/26/25 1031   Precautions   Precautions Fall Risk;Cognition/Communication   Fall Risk Poor balance;Low vision;Visual spatial neglect/Neglect program   Cognition/Communication Expressive Aphasia   Swallow Therapeutic dining   Comments R visual field cut   Impairment Assessments   Impairment Assessments Comprehension;Expression;Social Interaction;Problem Solving;Memory;Dysphagia   Comprehension Level of Assist Minimal Assist   Comprehension Description Cueing needed;Extra time needed   Expression Level of Assist Moderate Assist   Expression Description Set up of equipment;Extra time needed;Cueing needed   Social Interaction Level of Assist Modified Independent   Social Interaction Description Extra time needed   Problem Solving Level of Assist Moderate Assist   Problem Solving Description Cueing needed;Extra time needed   Interdisciplinary Plan of Care Collaboration   IDT Collaboration with  Family / Caregiver   Patient Position at End of Therapy Seated;Chair Alarm On;Self Releasing Lap Belt Applied;Family / Friend in Room   Collaboration Comments SO in session and family/friend present at end of ST.         Expressive Language  Purpose: to improve confidence in verbal expression across different environments (i.e. home, work, school, social settings, etc.), to improve the ability to verbally express  thoughts, needs, emotions, and ideas in everyday communication, and to improve the use of appropriate vocabulary, sentence structure, and grammar  Interventions:   Naming  Expressive language deficits characterized by: Perseverations and Word Finding Deficits (Anomia)     Written Language Expression  Purpose: to improve the ability to express information through written communication and to develop strategies to overcome word finding and spelling difficulties  Interventions:   Using letter tiles to spell single 3 to 4 letter words  Barriers to Written Language Expression: Impaired visual scanning/processing and Paraphasic errors    The pt independently achieved 5/11 (45%) for FO1 picture naming. He required sentence completion, semantic, first letter, and lyrical cues to increase to 100%. He required first letter cues for spelling accuracy in approximately 50-60% of words when using letter tiles.    Assessment:     The pt required overall mod A to complete picture naming and spelling with letter tiles. He benefited from sentence completion, semantic, first letter, and lyrical cues for picture naming tasks. Notably, after provided with a first letter cue when spelling target words, the pt independently initiated spelling the word and self corrected letter placement. He demonstrated awareness of how the target word was spelled, often spelling the word out loud before attempting to spell it with letter tiles. Initially [facility] agapito was used but abandon due to difficulty sequencing letter placement and R neglect.    Strengths: Able to follow instructions, Pleasant and cooperative, Supportive family, Willingly participates in therapeutic activities, Motivated for self care and independence  Barriers: Aphasia expressive, Aphasia receptive, Impaired functional cognition, Visual impairment    Plan:  Continue picture naming/spelling tasks with letter tiles  Continue SFA    Speech Therapy Problems (Active)        Problem: Expression STGs       Dates: Start:  03/22/25         Goal: STG-Within one week, patient will       Dates: Start:  03/22/25       Description: 1) Individualized goal:  describe selected vocabulary by semantic features with 80% acc and MIN A.   2) Interventions:  SLP Speech Language Treatment, SLP Self Care / ADL Training , and SLP Group Treatment          Goal Note filed on 03/25/25 0808 by Alyse Anderson MS,CCC-SLP       Patient names pictures with 56% acc increasing to 80% with sentence completion cue.  Patient needs mod and occasionally max cues to implement use of semantic features to improve word finding.                 Problem: Memory STGs       Dates: Start:  03/22/25         Goal: STG-Within one week, patient will       Dates: Start:  03/22/25       Description: 1) Individualized goal:  complete working memory tasks w/ 80% accuracy and MIN A.   2) Interventions:  SLP Self Care / ADL Training , SLP Cognitive Skill Development, and SLP Group Treatment          Goal Note filed on 03/24/25 1512 by Alyse Anderson MS,CCC-SLP       To be addressed.                  Problem: Problem Solving STGs       Dates: Start:  03/22/25         Goal: STG-Within one week, patient will       Dates: Start:  03/22/25       Description: 1) Individualized goal:  complete right scanning/neglect tasks w/ MIN A and 80% acc.   2) Interventions:  SLP Self Care / ADL Training , SLP Cognitive Skill Development, and SLP Group Treatment          Goal Note filed on 03/24/25 1512 by Alyse Anderson MS,CCC-SLP       Patient needs min to mod cues for 80% acc in environ mental scans related to eating.  Continue goal.                 Problem: Social Interaction STGs       Dates: Start:  03/22/25         Goal: STG-Within one week, patient will       Dates: Start:  03/22/25               Problem: Speech/Swallowing LTGs       Dates: Start:  03/22/25         Goal: LTG-By discharge, patient will safely swallow       Dates: Start:  03/22/25        Description: 1) Individualized goal:  regular textures and thin liquids implementing safe swallow strategies in >90% of opportunities with MOD I for a safe D/C to PLOF.   2) Interventions:  SLP Swallowing Dysfunction Treatment, SLP Self Care / ADL Training , and SLP Group Treatment             Goal: LTG-By discharge, patient will solve basic problems       Dates: Start:  03/22/25       Description: 1) Individualized goal:  w/ 80% acc and MOD I for a safe D/C to PLOF.   2) Interventions:  SLP Speech Language Treatment, SLP Self Care / ADL Training , SLP Cognitive Skill Development, and SLP Group Treatment                Problem: Swallowing STGs       Dates: Start:  03/22/25         Goal: STG-Within one week, patient will       Dates: Start:  03/22/25

## 2025-03-26 NOTE — CARE PLAN
"The patient is Watcher - Medium risk of patient condition declining or worsening    Shift Goals  Clinical Goals: Comfort and safety  Patient Goals: Comfort    Progress made toward(s) clinical / shift goals:    Problem: Knowledge Deficit - Standard  Goal: Patient and family/care givers will demonstrate understanding of plan of care, disease process/condition, diagnostic tests and medications  Outcome: Progressing     Problem: Fall Risk - Rehab  Goal: Patient will remain free from falls  Outcome: Progressing  Note: Genesis Elizondo Fall risk Assessment Score: 21    High fall risk Interventions   - Bed and strip alarm   - Yellow sign by the door   - Yellow wrist band \"Fall risk\"  - Room near to the nurse station  - Do not leave patient unattended in the bathroom  - Fall risk education provided     Problem: Discharge Barriers/Planning  Goal: Patient's continuum of care needs are met  Outcome: Progressing     Problem: Psychosocial  Goal: Patient's level of anxiety will decrease  Outcome: Progressing     Problem: Hemodynamics  Goal: Patient's hemodynamics, fluid balance and neurologic status will be stable or improve  Outcome: Progressing     Problem: Bowel Elimination  Goal: Patient will participate in bowel management program  Outcome: Progressing       Patient is not progressing towards the following goals:      "

## 2025-03-26 NOTE — PROGRESS NOTES
NURSING DAILY NOTE    Name: Lico Caba   Date of Admission: 3/21/2025   Admitting Diagnosis: Intracranial hemorrhage, spontaneous intraparenchymal, idiopathic, acute ([facility])  Attending Physician: SAVITA GOODMAN M.D.  Allergies: Patient has no known allergies.    Safety  Patient Assist  cga  Patient Precautions  Precautions: Fall Risk, Cognition/Communication  Fall Risk: Poor balance, Poor safety, Low vision, Visual spatial neglect/Neglect program  Cognition/Communication: Expressive Aphasia  Swallow: Therapeutic dining  Comments: R visual field cut  Bed Transfer Status  Stand By Assist  Toilet Transfer Status   Minimal Assist  Assistive Devices  Rails, Wheelchair  Oxygen  None - Room Air  Diet/Therapeutic Dining  Current Diet Order   Procedures    Diet Order Diet: Regular (T-dine, meats cut up)     Pill Administration  whole  Agitated Behavioral Scale  17  ABS Level of Severity  No Agitation    Fall Risk  Has the patient had a fall this admission?      Genesis Elizondo Fall Risk Scoring  21, HIGH RISK  Fall Risk Safety Measures  bed alarm, chair alarm, poor balance, and low vision/hearing    Vitals  Temperature: 36.5 °C (97.7 °F)  Temp src: Oral  Pulse: 66  Respiration: 18  Blood Pressure : 138/77  Blood Pressure MAP (Calculated): 97 MM HG  BP Location: Left, Upper Arm  Patient BP Position: Davis's Position     Oxygen  Pulse Oximetry: 96 %  O2 (LPM): 0  O2 Delivery Device: None - Room Air    Bowel and Bladder  Last Bowel Movement  03/22/25  Stool Type     Bowel Device  Bathroom  Continent  Bladder: Did not void   Bowel: No movement  Bladder Function  Urine Void (mL): 200 ml  Number of Times Voided: 1  Urine Color: Yellow  Genitourinary Assessment   Bladder Assessment (WDL):  Within Defined Limits  Urine Color: Yellow  Bladder Device: Bathroom, Urinal  Bladder Scan: Post Void  $ Bladder Scan Results (mL): 34    Skin  Dereje Score   19  Sensory Interventions   Bed Types: Standard/Trauma Mattress  Skin  Preventative Measures: Pillows in Use for Support / Positioning  Moisture Interventions         Pain  Pain Rating Scale  0 - No Pain  Pain Location  Head  Pain Location Orientation  Inner  Pain Interventions   Declines    ADLs    Bathing   Shower, Staff, Family / Significant Other  Linen Change   Partial  Personal Hygiene     Chlorhexidine Bath      Oral Care     Teeth/Dentures     Shave     Nutrition Percentage Eaten  Dinner, Between % Consumed  Environmental Precautions  Treaded Slipper Socks on Patient, Personal Belongings, Wastebasket, Call Bell etc. in Easy Reach, Bed in Low Position  Patient Turns/Positioning  Patient turns self independently side to side without assistance, to offload sacral area  Patient Turns Assistance/Tolerance  Standby Assist  Bed Positions  Bed Controls On, Bed Locked  Head of Bed Elevated  Self regulated      Psychosocial/Neurologic Assessment  Psychosocial Assessment  Psychosocial (WDL):  WDL Except  Patient Behaviors: Forgetful  Family Behaviors: No Family Present  Neurologic Assessment  Neuro (WDL): Exceptions to WDL  Level of Consciousness: Alert  Orientation Level: Disoriented to time  Cognition: Follows commands, Poor attention/concentration, Appropriate safety awareness  Speech: Clear  Pupil Assesment: No  Motor Function/Sensation Assessment: Motor strength  Muscle Strength Right Arm: Good Strength Against Gravity and Moderate Resistance  Muscle Strength Left Arm: Good Strength Against Gravity and Moderate Resistance  Muscle Strength Right Leg: Good Strength Against Gravity and Moderate Resistance  Muscle Strength Left Leg: Good Strength Against Gravity and Moderate Resistance  EENT (WDL):  WDL Except    Cardio/Pulmonary Assessment  Edema      Respiratory Breath Sounds  RUL Breath Sounds: Clear  RML Breath Sounds: Clear  RLL Breath Sounds: Clear, Diminished  ZACH Breath Sounds: Clear  LLL Breath Sounds: Clear, Diminished  Cardiac Assessment   Cardiac (WDL):  Within Defined  Limits

## 2025-03-26 NOTE — CARE PLAN
Problem: Pain - Standard  Goal: Alleviation of pain or a reduction in pain to the patient’s comfort goal  Note: Pain is manageable with scheduled medication at this time.Repositioned with pillows for comfort.Will continue to monitor and assess pain level and medicate as needed.     Problem: Bowel Elimination  Goal: Patient will participate in bowel management program  Note: Scheduled senna given at hs.Continent of bowel per report.LBM 3/25.Will continue to monitor.

## 2025-03-26 NOTE — DISCHARGE PLANNING
Case Management/IDT follow up.   Projected dc date: 4/3  IDT continues to recommend IRF level of care as patient continue to make progress with all therapies.     DC needs  Home health: PT/OT/SLP/RN/CNA/SW vs. O/P therapy pending therapy team final recs.   DME: Per therapy not DME recs anticipated.     Follow up: PCP, Neuro-Optometrist, Ortho    Attempted to meet with spouse and pt at bedside but requested post therapy. Attempted VM 2x and checked in room. General update left in VM, not pt info per HIPAA.   Spouse working with pt and therapy often. Will confirm no further questions as agreeable to plan per team.     Plan: Local [facility] 1STE with spouse.

## 2025-03-26 NOTE — THERAPY
"Physical Therapy   Daily Treatment     Patient Name:  Lico Caba  Age:  74 y.o., Sex:  male  Medical Record #:  [id number]  Today's Date: 3/26/2025     Precautions  Precautions: Fall Risk, Cognition/Communication  Fall Risk: Poor balance, Low vision, Visual spatial neglect/Neglect program  Cognition/Communication: Expressive Aphasia  Swallow: Therapeutic dining  Comments: R visual field cut    Subjective: Agreeable to tx. Responding \"yes\" frequently even when apparent that pt does not understand command/direction provided    Objective:    03/26/25 0930   PT Charge Group   PT Gait Training (Units) 1   PT Therapeutic Activities (Units) 1   PT Total Time Spent   PT Individual Total Time Spent (Mins) 30   Chair/Bed-to-Chair Transfer   Level of Assist Stand By Assist   Transfer Type Stand Step Transfer   Assistive Devices No AD   Additional Description Cueing needed;Extra time needed  (cueing for safety with brakes, seat belt, and foot rests)   Ambulation   Level of Assist Contact Guard Assist   Assistive Device FWW   Additional Description Cueing needed;Extra time needed   Distance [id number]   Curbs   Level of Assist Contact Guard Assist   Curb Height 6\" step   Additional Description Extra time needed;Cueing needed   Interdisciplinary Plan of Care Collaboration   IDT Collaboration with  Family / Caregiver   Patient Position at End of Therapy Seated;Self Releasing Lap Belt Applied;Family / Friend in Room   Collaboration Comments FT with spouse for providing CGA with gait FWW         Therapeutic Activities  Purpose: to provide patient and family education  Interventions:   Patient or family education- FT with spouse, Cecy, for ambulation FWW guarding on right side using FWW and gait belt in addition to safety with WC parts management    Gait Training  Purpose: to improve functional ambulation, to address gait deviations, and to improve walking endurance  Interventions:   Safe use of device  Walking " endurance  Ambulation with use of LiteGait BWS System:   Distance: 815 ft  Speed: 0.7-1.2 mph Incline: 0-3%  Time: 10 min total (5min x2)  Verbal cueing for posture, increased step length and decreased compensatory arm swing    Assessment: Pt's wife demonstrated competency in providing steadying assist for ambulation using FWW. Pt will benefit from continued standing balance and gait training without device prior to discharge.    Strengths: Adequate strength, Independent prior level of function, Making steady progress towards goals, Motivated for self care and independence, Pleasant and cooperative, Supportive family, Willingly participates in therapeutic activities  Barriers: Aphasia expressive, Aphasia receptive, Hearing impairment, Impaired balance, Impaired insight/denial of deficits, Impaired functional cognition, Visual impairment    Plan:   Ambulation with no AD (FT with spouse for FWW)  R sided visual scanning/compensatory strategies   Dynamic standing balance   Reassess Ding    DME    PT DME Recommendations  Assistive Device:  (none anticipated)      Passport items to be completed:  Get in/out of bed safely, in/out of a vehicle, safely use mobility device, walk or wheel around home/community, navigate up and down stairs, show how to get up/down from the ground, ensure home is accessible, demonstrate HEP, complete caregiver training    Physical Therapy Problems (Active)       Problem: Mobility       Dates: Start:  03/22/25         Goal: STG-Within one week, patient will ambulate 300ft SBA no device with moderate  verbal cueing to scan right       Dates: Start:  03/22/25         Goal Note filed on 03/25/25 0809 by Yunior Staples, PT       SBA-CGA                 Problem: PT-Long Term Goals       Dates: Start:  03/22/25         Goal: LTG-By discharge, patient will improve score on Ding balance assessment to >/= 41/56 to indicate low fall risk       Dates: Start:  03/22/25            Goal: LTG-By discharge,  patient will ambulate 500ft SPV no device       Dates: Start:  03/22/25            Goal: LTG-By discharge, patient will transfer one surface to another independently       Dates: Start:  03/22/25            Goal: LTG-By discharge, patient will participate in community outing with minimal verbal cueing to scan right in novel environment and no physical assistance required for mobility       Dates: Start:  03/22/25

## 2025-03-26 NOTE — THERAPY
Occupational Therapy  Daily Treatment     Patient Name:  Lico Caba  Age:  74 y.o., Sex:  male  Medical Record #:  [id number]  Today's Date:  3/26/2025    Precautions  Precautions: Fall Risk, Cognition/Communication  Fall Risk: Poor balance, Low vision, Visual spatial neglect/Neglect program  Cognition/Communication: Expressive Aphasia  Swallow: Therapeutic dining  Comments: R visual field cut    Subjective: Pt seated in w/c upon arrival, pleasant and cooperative, agreeable to therapy. Spouse present throughout session      Objective:    03/26/25 1301   OT Charge Group   OT Self Care / ADL (Units) 4   OT Total Time Spent   OT Individual Total Time Spent (Mins) 60   Grooming   Level of Assist Supervised   Patient Position Seated   Upper Body Dressing   Level of Assist Stand by Assist   Patient Position Seated   Clothing Item(s) Pullover shirt   Lower Body Dressing   Level of Assist Stand by Assist   Patient Position Standing;Seated   Clothing Item(s) Pants;Disposable Brief   Additional Description Grab bars;Assist for clothing orientation;Cueing needed   Putting On/Taking Off Footwear   Level of Assist Stand by Assist   Patient Position Seated   Footwear Type Treaded Socks   Shower/Bathe Self   Level of Assist Stand By Assist   Patient Position Seated;Standing   Adaptive Equipment Grab Bars;Handheld Shower Head;Fold Down Shower Bench   Additional Description Cueing needed   Tub/Shower Transfer   Level of Assist Contact Guard Assist   Adaptive Equipment Grab bars   Assistive Device No AD  (ambulate from room)   Toilet Transfer   Level of Assist Minimal Assist   Transfer Type Stand Step Transfer   Adaptive Equipment Grab bars;3-in-1 Commode   Assistive Device No AD  (ambulate from room)   Additional Description Cueing needed  (spatial orientation)   Toileting Hygiene   Level of Assist Stand By Assist   Additional Description Grab bars;Cueing needed   Interdisciplinary Plan of Care Collaboration   Patient  Position at End of Therapy Seated;Call Light within Reach;Tray Table within Reach;Family / Friend in Room;Chair Alarm On;Self Releasing Lap Belt Applied       Assessment:    Pt completed shower routine at SBA - CGA for ADL's and bathroom transfers overall using no AD, shower bench, hand-held shower head, GB's. Pt's functional performance was impacted by significant visual deficits, perseveration (max cues to stop washing arms), difficulty with initiation (max cues to remind him to use soap, to move on to washing other body parts). Bathing more difficult to complete than dressing that appeared more automatic     Strengths: Independent prior level of function, Making steady progress towards goals, Motivated for self care and independence, Pleasant and cooperative, Supportive family, Willingly participates in therapeutic activities  Barriers: Aphasia expressive, Aphasia receptive, Confused, Decreased endurance, Difficulty following instructions, Fatigue, Generalized weakness, Impaired activity tolerance, Impaired balance, Impaired carryover of learning, Impaired insight/denial of deficits, Impaired functional cognition, Impulsive, Limited mobility, Visual impairment    Plan:  R visual scanning and safety awareness, RUE neuro re-ed (ataxia), functional cognition (memory, aphasia, apraxia, attention, problem solving), overall strength/endurance and standing balance    Spouse cleared to ambulate with pt at FWW level     Lives with spouse, 1 story, 1 CAROL ANN, WIS, tub       Occupational Therapy Goals (Active)       Problem: Bathing       Dates: Start:  03/22/25         Goal: STG-Within one week, patient will bathe at SBA level.       Dates: Start:  03/22/25         Goal Note filed on 03/24/25 1440 by Kaya Blackburn MS,OTR/L       Not yet addressed                 Problem: Dressing       Dates: Start:  03/22/25         Goal: STG-Within one week, patient will dress LB at SBA level.       Dates: Start:  03/22/25         Goal  Note filed on 03/24/25 1440 by Kaya Blackburn MS,OTR/L       Not yet addressed                 Problem: Functional Transfers       Dates: Start:  03/22/25         Goal: STG-Within one week, patient will transfer to toilet at SBA level.       Dates: Start:  03/22/25         Goal Note filed on 03/24/25 1440 by Kaya Blackburn MS,OTR/L       Requires CGA              Goal: STG-Within one week, patient will transfer to tub/shower at SBA level.       Dates: Start:  03/22/25         Goal Note filed on 03/24/25 1440 by Kaya Blackburn MS,OTR/L       Not yet addressed                 Problem: OT Long Term Goals       Dates: Start:  03/22/25         Goal: LTG-By discharge, patient will complete basic self care tasks at SPV-Mod I level.       Dates: Start:  03/22/25            Goal: LTG-By discharge, patient will perform bathroom transfers at SPV-Mod I level.       Dates: Start:  03/22/25               Problem: Toileting       Dates: Start:  03/22/25         Goal: STG-Within one week, patient will complete toileting tasks at SBA level.       Dates: Start:  03/22/25         Goal Note filed on 03/24/25 1440 by Kaya Blackburn MS,OTR/L       Not yet addressed

## 2025-03-26 NOTE — THERAPY
Recreational Therapy  Daily Treatment     Patient Name: Lico Caba  AGE:  74 y.o., SEX:  male  Medical Record #: [id number]  Today's Date: 3/26/2025     Subjective: Patient was agreeable to participate in session.     Objective:    03/26/25 0901   Treatment Time   Total Time Spent (mins) 30   Precautions   Precautions Fall Risk;Cognition/Communication   Fall Risk Poor balance;Low vision;Visual spatial neglect/Neglect program   Cognition/Communication Expressive Aphasia   Swallow Therapeutic dining   Comments R visual field cut   Patient Presentation   Attention Span Remains on Task   Affect Appropriate   Behavior Appropriate   Participation Level   Participation Level Full   Strengths & Barriers   Strengths Able to follow instructions;Alert and oriented;Independent prior level of function;Motivated for self care and independence;Pleasant and cooperative;Supportive family;Willingly participates in therapeutic activities   Barriers Aphasia expressive;Visual impairment  (right neglect)   Interdisciplinary Plan of Care Collaboration   IDT Collaboration with  Family / Caregiver;Physical Therapist   Patient Position at End of Therapy Seated;Family / Friend in Room   Collaboration Comments Wife in session; hand off to PT         Cognitive Skills  Purpose:  To encourage visual scanning   Interventions:   Board game activity: Sequence  Patient played a familiar game that required him to scan the entire playing board. Needed moderate assistance and cueing but was able to play the game.     Assessment    Patient needed prompts to scan to the right to find cards. Often would find the number of the card with a different shape but when asked to find a certain card was able to do some of the time.     Strengths: (P) Able to follow instructions, Alert and oriented, Independent prior level of function, Motivated for self care and independence, Pleasant and cooperative, Supportive family, Willingly participates in  therapeutic activities  Barriers: (P) Aphasia expressive, Visual impairment (right neglect)    Plan  Will continue to encourage visual scanning.     Passport items to be completed:  Verbalize two positive leisure activities, discuss returning to work, hobbies, community groups or volunteer activities, explore community resources

## 2025-03-26 NOTE — CONSULTS
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 Lico Caba is a 74 y.o., male, right-handed,  admitted on 3/21/2025 to [facility] for his continued acute medical management, nursing cares, and comprehensive therapies. Briefly, pt admitted on 3/16/2025 to [facility] with expressive aphasia and worsening headache. 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 NSY was consulted and did not recommend surgical intervention. He was started on Keppra & treated with prednisone, Fioricet, and Tramadol for headaches. Rehabilitation psychology was consulted to complete evaluation and testing for emotional and/or cognitive barriers interfering with Rehab and medical care.     Imaging/Testing Results:  CT shows a large 5.2 x 4 x 6.4 cm hematoma in the left posterior parietal lobe small amount of adjacent subarachnoid hemorrhage in the left parietal lobe just anterior to the parenchymal hematoma. There was mild adjacent edema and slight effacement of the left lateral ventricle but no midline shift. There suspected varying ages of hemorrhage with varying densities.     CT angio of the head she was a parietal parenchymal hemorrhage 5.2 x 4 x 6.4 no significant mass effect. No evidence of vascular malformation. Incidentally there was a venous angioma in the right frontal convexity. Otherwise CTA intracranial cerebral of Meza vessels are negative there was no evidence of large vessel occlusion.     A CT angio of the neck has been performed but the results have not been pushed over. I have spoken w/ Dr. Turner of  Radiology- formal read of the CTA neck is pending.     MRI-Brain 3/16: There is a large left posterior parietal parenchymal hemorrhage measuring 4.4 cm transverse by 6.4 cm AP by 6.5 cm CC.  This is similar to the size of the hemorrhage on CT.  There is mild adjacent edema and mass effect with slight effacement of the left lateral ventricle posterior body.  No midline shift is present.  There are mixed signal intensity areas of the hemorrhage suggesting different ages components.  Fluid blood level is seen in the anterior portion of the hemorrhage.  There is a small amount of subarachnoid hemorrhage in the left parietal region just anterior to the parenchymal hemorrhage.  There are no extra-axial fluid collections otherwise noted.  No restricted diffusion is seen to suggest acute infarct.  Following infusion of contrast there is no abnormal enhancement.  No evidence of vascular malformation is seen.     CT Head 3/16: here is mild diffuse cerebral atrophy.  There is moderate to large left parietal parenchymal hemorrhage.  Small amount of subarachnoid hemorrhage is seen in the left parietal lobe anterior to the hematoma.  Hematoma measures 4.7 cm transverse by 6.1 cm AP by 6.5 cm CC.  This compares to 4.0 x 5.2 x 6.4 cm previously.  Hematoma is felt to be slightly larger since the prior exam.  There is still only minimal adjacent edema and mass effect.  No midline shift is seen.  Hematoma consists of somewhat varying densities indicating probably different acute and subacute areas of hemorrhage of different ages.     CT Head 3/17: Sinuses exhibit minimal mucoperiosteal thickening in the right maxillary sinus and ethmoid air cells. No skull lesions. The left posterior parietal intraparenchymal hemorrhage has increased mildly in size and exhibits areas of increased hyperdensity consistent with mild growth since yesterday's exam. A size comparison at the level of the left lateral ventricle occipital horn with  calcification measures 5.0 cm x 4.6 cm currently compared with 4.8 cm x 3.3 cm previously. Persistent localized edema surrounding the hemorrhage. Small discoid areas of peripheral hemorrhage in the left sylvian fissure and posterior left occipital lobe are stable. No midline shift. Basal cisterns are preserved. Left lateral ventricle remains mildly compressed.     CT Head 3/18:Subacute parenchymal hemorrhage centered in the left parietal lobe now measures up to 5.1 x 4.5 x 6.1 cm (previously 4.9 x 4.6 x 5.9 cm) with similar adjacent edema and local sulcal effacement. Similar small adjacent subarachnoid hemorrhages. No new intracranial hemorrhage. No midline shift. Mild diffuse cortical volume loss.   Unchanged effacement of the occipital horn of the left lateral ventricle.   No hydrocephalus. Visualized paranasal sinuses are clear. The mastoid air cells are clear. The visible orbits are normal. No acute fracture. Unremarkable soft tissues.     Psychotropics:  Levetiracetam 500mg BID  Trazodone 50mg QHS PRN    Pain Medications:  Fioricet -40mg q6PRN  Roxicodone 2.5mg q4PRN    PMH/PSH:  Past Medical History:   Diagnosis Date    Arthritis     back right leg    Bowel habit changes     constipation    Heart murmur     states he was told he had a murmur with in the service    Pain 01/04/2022    pain 10/10    Snoring      Past Surgical History:   Procedure Laterality Date    PB LAMINOTOMY,LUMBAR DISK,1 INTRSP  1/5/2022    Procedure: L3/4 and L4/5  decompressive laminectomy and right L3/4 discectomy, L3/4 and L4/5 bilateral foraminotomies;  Surgeon: Allyson Sutton M.D.;  Location: [facility];  Service: Orthopedics    LUMBAR DECOMPRESSION  1/5/2022    Procedure: DECOMPRESSION, SPINE, LUMBAR;  Surgeon: Allyson Sutton M.D.;  Location: [facility];  Service: Orthopedics    FORAMINOTOMY  1/5/2022    Procedure: FORAMINOTOMY, SPINE;  Surgeon: Allyson Sutton M.D.;  Location: [facility];   "Service: Orthopedics    OTHER      tonsils       PSYCHOSOCIAL HISTORY    Social/Vocational History:   Lico Caba is  and lives in a single level house in [place] with his wife, Lilia. Pt indicated normal childhood development and reported 14 years as highest level of education. Pt is retired with work history as a \".\" Pt is a  ([employer]; access to [employer] benefits). He was driving PTA; Prior Level of Functioning (PLOF) was Independent. Social supports include spouse, 2 children, grandchildren, friends, extended family. Pt grew up in [place] with 4 siblings (3 brothers, 1 twin sister).     Mental Health/Substance Use History:  No significant mental health history was endorsed to include psychotherapy, psychotropics, prior diagnosis, suicide attempt, SPED, LD, or mental health hospitalization. Pt reported quitting smoking >30 years ago and using alcohol and marijuana \"once in awhile.\"     Family Psych History: bipolar disorder (sister)    SUBJECTIVE    Mood: Stated mood as \"grateful\" and pt did not endorse pervasive negative mood, suicidal ideation, or thoughts of harm toward self or others.     Sleep: Sleep quality was rated as improved with lessening of headache; no reported history of sleep difficulty.    Appetite: Self-reported as good  and pt did not report nausea or vomiting.    Pain:  Pt did not report being in pain during evaluation. He reported hs headaches have been improving over the past few days.    Neuropsych Review of Systems: He reported changes in cognition in the areas of: receptive language, expressive language--word finding, expressive language--aphasia, attention and concentration, processing speed, and visuomotor coordination. In addition, pt is endorsing: fatigue, right-sided weakness, visual changes, & ambulation difficulties.     Goals for Rehab: \"live better\"    Personally Identified Strengths: emphasize \"love, beauty, laughter, music\"; family-oriented   " "  Hobbies: cycling, golf, reading, music, hiking    Coping Strategies with Hospitalization: engaging with support system       ASSESSMENT    Behavioral Observations:  Pt was alert, agreeable to, and cooperative with evaluation. Via yes/no lines of questions pt was oriented to day of week (with logical cuing), month, and situation; he was able to state city with phonemic lead-in cue.    Conversational receptive language appeared to be generally intact. Expressive language at times was fluent, but at times was non-fluent and contained paraphasias in expression. TP negative for AH/VH/SI/HI. Non-verbal behaviors were appropriate to context. He exhibited right-sided visual neglect and benefited from cuing to scan to right. Mood and affect were congruent and euthymic.     On writing trial, when asked to write a sentence about the weather he wrote \"40.\"    Cognitive Assessment:  [place] Aphasia Screening Test (MAST) (58/100):    Expressive Subscale (30/50):   Receptive Subscale (28/50)   -Confrontational Namin10   -Yes/No Responses:    -Automatic Speech: 9/10    -Object Recognition 10/10   -Repetition :6/10     -Following Instructions: 4/10   -Spelling/Writing: 10/10    -Reading Instructions: 0/10   -Verbal Fluency: 5/10    CLOX, an executive drawing task that measures goal selection, motor planning and sequencing, selective attention, and self-monitoring, was attempted. Pt's performance of spontaneous first trial included a [place] on the left side of the page with \"04\" written largely in the middle. After being shown a model and asked to make a copy, his drawing contained a large [place] with unintelligible writing to the left upper quadrant and two hands placed (without arrows).      Mood Rating:  Geriatric Depression Scale-15 (GDS-15)= 0/15; no significant depressive symptomology  Items Endorsed: none  Generalized Anxiety Disorder-7 Item (ROQUE-7)= 2/21; no significant anxious symptomology   Items Endorsed: " nervousness and irritability or becoming easily annoyed    IMPRESSION     Lico Bryan Vicentecatalina was seen in context of recent IRF hospitalization s/p large L posterior temporoparietal hemorrhage. PTA pt was independently managing iADLs and driving. Mental health history is grossly unremarkable.    On evaluation, pt presented with expressive aphasia with anomia and reduced repetition. Receptive language was reduced in performance in answering complex yes/no questions and verbal or aural command following. Pt exhibited right-sided inattention/neglect and reduced visuospatial abilities (as measured by clock drawing). Ongoing assessment of language and cognitive abilities will be completed as able during IPR to provide treatment recommendations. At this time, return to driving is not recommended, however, and he will benefit from oversight of iADLs at discharge. Eye evaluation is recommended and is outpatient neuropsychological evaluation in ~6 months to update recs, establish cognitive baseline, and aid in diagnostic evaluation.    Pt not endorsing post-stroke mood disturbance at this time and reported strong social support system. Pt endorsed concerns with headache, which have been improving. Pt appears to be highly motivated to participate in rehab therapies. It is expected that cognition will be adequate to meet the demands of IPR.     DIAGNOSIS    Cognitive Impairment (aphasia)    RECOMMENDATIONS     1. Pt + spouse was provided w/ feedback of eval results and psychoeducation regarding Psychology service; coping; cognition; stress management;  post-stroke adjustment reactions and possible emotional, cognitive, and behavioral changes. Intervention centered on: supportive/processing therapy; development of rapport; relaxation and coping skills training (e.g., 'counting your blessings'); and psychoeducation. Pt will be followed by this service for cognitive/emotional assessment, coping skills, emotional support,  "education, and supportive therapy.  2. Please contact if co-treatment would be beneficial.   3. At this time return to  driving is not recommended. Pt will benefit from outpatient Neuropsychology follow-up in ~6 months to assess readiness for return to  driving from a cognitive perspective.  4. Pt may benefit from eye evaluation.  5. Pt may benefit from oversight or assistance with IADLs such as financial or medication management.  Pt may benefit from oversight with medications as described above with gradual removal of supervision after demonstrating consistent, adequate performance.   6. Pt may benefit from:  -Provide pt with extra time in conversation to response to support verbal expression.  -Pt may benefit from cues to slow down in conversation.  -Developing phrases such as \"Give me a minute to think,\" or \"I'm not sure, but I think it was XX, was that right?.\" Engagement & interpersonal effectiveness may decrease over time if he uses phrases such as \"I don't know\"; and we discussed the importance of interpersonal effectiveness post-stroke as a facilitator for independence.   -Minimizing external conversations   -Introduce topics before changing topics in conversation   -Group questions of similar topics together   -Provide simple directions   -Use simple, familiar words  -Encouragement to describe the word, associate the word of which it is related to, gesture, draw, think of the first letter, use a word that means something similar, narrow it down, or come back to it  7. Given difficulties with reading comprehension, he may benefit from information being presented in multiple modalities (e.g., pictoral, verbal, etc.) with messages that are clear, give the most  important information first, and avoid the use of jargon or acronyms to ensure appropriate comprehension of information.   8. Pt may benefit from:   -Emphasizing new learning via demonstration vs. Explanation   -Allowing increased practice to " facilitate encoding of novel information and unfamiliar tasks  -Emphasis on learning-by-doing and overpractice of new or novel techniques   -Corrective feedback   -Hand-over-hand learning to support errorless learning   -Positive reinforcement and encouragement   -Utilization of teachback methods for pt to speak back instructions/information to assess comprehension of information   -Cuing to scan the environment.  -Visual anchoring strategies (e.g., highlighting left-side of page, using bright tape) to assist with visual scanning.  -Adaptive equipment (e.g., sectioned plate) or adaptations to increase functional independence in environment (e.g., stating out loud where food is on the plate, etc.)      Pt acknowledged information and willingness to try skills as needed or as prompted by staff.  The intent and utility of testing was relayed, and pt agreed to participate as needed. The purpose and method of the Psychology service, as well as the limits of confidentiality and billing and fees, were described to the pt. The pt verbalized understanding and agreement to participate in the evaluation.    Pt demonstrated ability and willingness to work towards goals, receptivity to psychological support and plan of care.       Therapy goals/plans were reviewed and discussed with patient and he was in agreement.         Thank you for this consultation.    Kristan Fischer, PhD, ABPP-Rp  Licensed Psychologist

## 2025-03-27 ENCOUNTER — APPOINTMENT (OUTPATIENT)
Dept: SPEECH THERAPY | Facility: REHABILITATION | Age: 75
DRG: 057 | End: 2025-03-27
Attending: PHYSICAL MEDICINE & REHABILITATION
Payer: COMMERCIAL

## 2025-03-27 ENCOUNTER — ANCILLARY PROCEDURE (OUTPATIENT)
Dept: CARDIOLOGY | Facility: REHABILITATION | Age: 75
DRG: 057 | End: 2025-03-27
Attending: PHYSICAL MEDICINE & REHABILITATION
Payer: COMMERCIAL

## 2025-03-27 ENCOUNTER — APPOINTMENT (OUTPATIENT)
Dept: PHYSICAL THERAPY | Facility: REHABILITATION | Age: 75
DRG: 057 | End: 2025-03-27
Attending: PHYSICAL MEDICINE & REHABILITATION
Payer: COMMERCIAL

## 2025-03-27 ENCOUNTER — APPOINTMENT (OUTPATIENT)
Dept: OCCUPATIONAL THERAPY | Facility: REHABILITATION | Age: 75
DRG: 057 | End: 2025-03-27
Attending: PHYSICAL MEDICINE & REHABILITATION
Payer: COMMERCIAL

## 2025-03-27 PROCEDURE — 92507 TX SP LANG VOICE COMM INDIV: CPT

## 2025-03-27 PROCEDURE — 97530 THERAPEUTIC ACTIVITIES: CPT

## 2025-03-27 PROCEDURE — 97116 GAIT TRAINING THERAPY: CPT

## 2025-03-27 PROCEDURE — 700102 HCHG RX REV CODE 250 W/ 637 OVERRIDE(OP): Performed by: PHYSICAL MEDICINE & REHABILITATION

## 2025-03-27 PROCEDURE — A9270 NON-COVERED ITEM OR SERVICE: HCPCS | Performed by: PHYSICAL MEDICINE & REHABILITATION

## 2025-03-27 PROCEDURE — 97112 NEUROMUSCULAR REEDUCATION: CPT

## 2025-03-27 PROCEDURE — 99232 SBSQ HOSP IP/OBS MODERATE 35: CPT | Performed by: STUDENT IN AN ORGANIZED HEALTH CARE EDUCATION/TRAINING PROGRAM

## 2025-03-27 PROCEDURE — 93970 EXTREMITY STUDY: CPT

## 2025-03-27 PROCEDURE — 770010 HCHG ROOM/CARE - REHAB SEMI PRIVAT*

## 2025-03-27 PROCEDURE — 93970 EXTREMITY STUDY: CPT | Mod: 26 | Performed by: INTERNAL MEDICINE

## 2025-03-27 RX ADMIN — LEVETIRACETAM 500 MG: 500 TABLET, FILM COATED ORAL at 08:26

## 2025-03-27 RX ADMIN — SENNOSIDES AND DOCUSATE SODIUM 2 TABLET: 50; 8.6 TABLET ORAL at 19:50

## 2025-03-27 RX ADMIN — OMEPRAZOLE 20 MG: 20 CAPSULE, DELAYED RELEASE ORAL at 08:26

## 2025-03-27 RX ADMIN — LEVETIRACETAM 500 MG: 500 TABLET, FILM COATED ORAL at 19:50

## 2025-03-27 ASSESSMENT — BALANCE ASSESSMENTS
SITTING UNSUPPORTED: 4
LOOK OVER LEFT AND RIGHT SHOULDERS WHILE STANDING: 2
STANDING UNSUPPORTED WITH EYES CLOSED: 4
TURN 360 DEGREES: 2
STANDING UNSUPPORTED ONE FOOT IN FRONT: 3
TRANSFERS: 4
PICK UP OBJECT FROM THE FLOOR FROM A STANDING POSITION: 3
STANDING UNSUPPORTED WITH FEET TOGETHER: 4
SITTING TO STANDING: 4
PLACE ALTERNATE FOOT ON STEP OR STOOL WHILE STANDING UNSUPPORTED: 2
LONG VERSION TOTAL SCORE (MAX 56): 46
REACHING FORWARD WITH OUTSTRETCHED ARM WHILE STANDING: 3
STANDING UNSUPPORTED: 4
STANDING ON ONE LEG: 3
MEDICARE IMPAIRMENT PERCENTAGE: 18
LONG VERSION TOTAL SCORE (MAX 56): 46
STANDING TO SITTING: 4

## 2025-03-27 NOTE — THERAPY
"Speech Language Pathology  Daily Treatment     Patient Name:  Lico Caba  Age:  74 y.o., Sex:  male  Medical Record #:  [id number]  Today's Date: 3/26/2025    Precautions  Precautions: Fall Risk, Cognition/Communication  Fall Risk: Poor balance, Low vision, Visual spatial neglect/Neglect program  Cognition/Communication: Expressive Aphasia  Swallow: Therapeutic dining  Comments: R visual field cut    Subjective: Patient agreeable to therapy.  Spouse accompanied him to session.     Objective:    03/26/25 0731   SLP Charge Group   SLP Treatment - Individual Speech Language Treatment - Individual   SLP Total Time Spent   SLP Individual Total Time Spent (Mins) 30         Expressive Language  Purpose: to improve confidence in verbal expression across different environments (i.e. home, work, school, social settings, etc.), to improve the ability to verbally express thoughts, needs, emotions, and ideas in everyday communication, and to utilize compensatory strategies to effectively communicate  Interventions:   Naming  Expressive language deficits characterized by: Perseverations and Word Finding Deficits (Anomia)       Assessment:     Patient attended session with spouse.  Worked on focus one element of SFA asking patient to generate names for given category.  Patient reports that he likes to photograph flowers.  Patient was not able to generate flower names independently but was able to provide 6 flower names with cues for appearance, when you are likely to see them. location, if grown from a bulb and other features.  Patient also benefited from picture cue as well.   Patient does struggle with use of SFA and needs support and extra time.  Patient and spouse also introduced to use of \"Lokesh's Cat video clips.  Patient was able to describe simple actions with 40% acc, repetitions needed, and exta time.  Suggested using this medium as practice, isolating clips and having patient decribe what is " happening.    Strengths: Able to follow instructions, Pleasant and cooperative, Supportive family, Willingly participates in therapeutic activities, Motivated for self care and independence  Barriers: Aphasia expressive, Aphasia receptive, Impaired functional cognition, Visual impairment    Plan:    Target naming and use of SFA, and other word finding strategies.    Passport items to be completed:  Express basic needs, understand food/liquid recommendations, consistently follow swallow precautions, manage finances, manage medications, arrive to therapy appointments on time, complete daily memory log entries, solve problems related to safety situations, review education related to hospitalization, complete caregiver training     Speech Therapy Problems (Active)       Problem: Expression STGs       Dates: Start:  03/22/25         Goal: STG-Within one week, patient will       Dates: Start:  03/22/25       Description: 1) Individualized goal:  describe selected vocabulary by semantic features with 80% acc and MIN A.   2) Interventions:  SLP Speech Language Treatment, SLP Self Care / ADL Training , and SLP Group Treatment          Goal Note filed on 03/25/25 0808 by Alyse Anderson MS,CCC-SLP       Patient names pictures with 56% acc increasing to 80% with sentence completion cue.  Patient needs mod and occasionally max cues to implement use of semantic features to improve word finding.                 Problem: Memory STGs       Dates: Start:  03/22/25         Goal: STG-Within one week, patient will       Dates: Start:  03/22/25       Description: 1) Individualized goal:  complete working memory tasks w/ 80% accuracy and MIN A.   2) Interventions:  SLP Self Care / ADL Training , SLP Cognitive Skill Development, and SLP Group Treatment          Goal Note filed on 03/24/25 1512 by Alyse Anderson MS,CCC-SLP       To be addressed.                  Problem: Problem Solving STGs       Dates: Start:  03/22/25         Goal:  STG-Within one week, patient will       Dates: Start:  03/22/25       Description: 1) Individualized goal:  complete right scanning/neglect tasks w/ MIN A and 80% acc.   2) Interventions:  SLP Self Care / ADL Training , SLP Cognitive Skill Development, and SLP Group Treatment          Goal Note filed on 03/24/25 1512 by Alyse Anderson MS,CCC-SLP       Patient needs min to mod cues for 80% acc in environ mental scans related to eating.  Continue goal.                 Problem: Social Interaction STGs       Dates: Start:  03/22/25         Goal: STG-Within one week, patient will       Dates: Start:  03/22/25               Problem: Speech/Swallowing LTGs       Dates: Start:  03/22/25         Goal: LTG-By discharge, patient will safely swallow       Dates: Start:  03/22/25       Description: 1) Individualized goal:  regular textures and thin liquids implementing safe swallow strategies in >90% of opportunities with MOD I for a safe D/C to PLOF.   2) Interventions:  SLP Swallowing Dysfunction Treatment, SLP Self Care / ADL Training , and SLP Group Treatment             Goal: LTG-By discharge, patient will solve basic problems       Dates: Start:  03/22/25       Description: 1) Individualized goal:  w/ 80% acc and MOD I for a safe D/C to PLOF.   2) Interventions:  SLP Speech Language Treatment, SLP Self Care / ADL Training , SLP Cognitive Skill Development, and SLP Group Treatment                Problem: Swallowing STGs       Dates: Start:  03/22/25         Goal: STG-Within one week, patient will       Dates: Start:  03/22/25

## 2025-03-27 NOTE — PROGRESS NOTES
NURSING DAILY NOTE    Name: Lico Caba   Date of Admission: 3/21/2025   Admitting Diagnosis: Intracranial hemorrhage, spontaneous intraparenchymal, idiopathic, acute ([facility])  Attending Physician: SAVITA GOODMAN M.D.  Allergies: Patient has no known allergies.    Safety  Patient Assist  cga  Patient Precautions  Precautions: Fall Risk, Cognition/Communication  Fall Risk: Poor balance, Low vision, Visual spatial neglect/Neglect program  Cognition/Communication: Expressive Aphasia  Swallow: Therapeutic dining  Comments: R visual field cut  Bed Transfer Status  Stand By Assist  Toilet Transfer Status   Minimal Assist  Assistive Devices  Rails, Wheelchair  Oxygen  None - Room Air  Diet/Therapeutic Dining  Current Diet Order   Procedures    Diet Order Diet: Regular (T-dine, meats cut up)     Pill Administration  whole  Agitated Behavioral Scale  17  ABS Level of Severity  No Agitation    Fall Risk  Has the patient had a fall this admission?      Genesis Elizondo Fall Risk Scoring  21, HIGH RISK  Fall Risk Safety Measures  bed alarm, chair alarm, and seatbelt alarm    Vitals  Temperature: 36.7 °C (98 °F)  Temp src: Oral  Pulse: 61  Respiration: 18  Blood Pressure : 129/80  Blood Pressure MAP (Calculated): 96 MM HG  BP Location: Right, Upper Arm  Patient BP Position: Supine     Oxygen  Pulse Oximetry: 96 %  O2 (LPM): 0  O2 Delivery Device: None - Room Air    Bowel and Bladder  Last Bowel Movement  03/25/25  Stool Type     Bowel Device  Bathroom  Continent  Bladder: Did not void   Bowel: No movement  Bladder Function  Urine Void (mL): 300 ml  Number of Times Voided: 1  Urine Color: Yellow  Genitourinary Assessment   Bladder Assessment (WDL):  Within Defined Limits  Urine Color: Yellow  Bladder Device: Urinal  Bladder Scan: Post Void  $ Bladder Scan Results (mL): 34    Skin  Dereje Score   19  Sensory Interventions   Bed Types: Standard/Trauma Mattress  Skin Preventative Measures: Pillows in Use for Support /  Positioning  Moisture Interventions         Pain  Pain Rating Scale  0 - No Pain  Pain Location  Head  Pain Location Orientation  Inner  Pain Interventions   Declines    ADLs    Bathing   Shower, Staff, Family / Significant Other  Linen Change   Partial  Personal Hygiene     Chlorhexidine Bath      Oral Care     Teeth/Dentures     Shave     Nutrition Percentage Eaten  Dinner, Between % Consumed  Environmental Precautions  Treaded Slipper Socks on Patient, Personal Belongings, Wastebasket, Call Bell etc. in Easy Reach, Bed in Low Position  Patient Turns/Positioning  Patient turns self independently side to side without assistance, to offload sacral area  Patient Turns Assistance/Tolerance  Standby Assist  Bed Positions  Bed Controls On, Bed Locked  Head of Bed Elevated  Self regulated      Psychosocial/Neurologic Assessment  Psychosocial Assessment  Psychosocial (WDL):  WDL Except  Patient Behaviors: Forgetful  Family Behaviors: Family Present, Anxious  Neurologic Assessment  Neuro (WDL): Exceptions to WDL  Level of Consciousness: Alert  Orientation Level: Disoriented to time  Cognition: Follows commands, Poor attention/concentration, Appropriate safety awareness  Speech: Clear  Pupil Assesment: No  Motor Function/Sensation Assessment: Motor strength  Muscle Strength Right Arm: Good Strength Against Gravity and Moderate Resistance  Muscle Strength Left Arm: Good Strength Against Gravity and Moderate Resistance  Muscle Strength Right Leg: Good Strength Against Gravity and Moderate Resistance  Muscle Strength Left Leg: Good Strength Against Gravity and Moderate Resistance  EENT (WDL):  WDL Except    Cardio/Pulmonary Assessment  Edema      Respiratory Breath Sounds  RUL Breath Sounds: Clear  RML Breath Sounds: Clear  RLL Breath Sounds: Diminished  ZACH Breath Sounds: Clear  LLL Breath Sounds: Diminished  Cardiac Assessment   Cardiac (WDL):  Within Defined Limits

## 2025-03-27 NOTE — PROGRESS NOTES
NURSING DAILY NOTE    Name: Lico Caba   Date of Admission: 3/21/2025   Admitting Diagnosis: Intracranial hemorrhage, spontaneous intraparenchymal, idiopathic, acute ([facility])  Attending Physician: SAVITA GOODMAN M.D.  Allergies: Patient has no known allergies.    Safety  Patient Assist  cga  Patient Precautions  Precautions: Fall Risk, Cognition/Communication  Fall Risk: Poor balance, Low vision, Visual spatial neglect/Neglect program  Cognition/Communication: Expressive Aphasia  Swallow: Therapeutic dining  Comments: R visual field cut  Bed Transfer Status  Stand By Assist  Toilet Transfer Status   Minimal Assist  Assistive Devices  Rails, Wheelchair  Oxygen  None - Room Air  Diet/Therapeutic Dining  Current Diet Order   Procedures    Diet Order Diet: Regular (T-dine, meats cut up)     Pill Administration  whole  Agitated Behavioral Scale  17  ABS Level of Severity  No Agitation    Fall Risk  Has the patient had a fall this admission?      Genesis Elizondo Fall Risk Scoring  21, HIGH RISK  Fall Risk Safety Measures  bed alarm, chair alarm, poor balance, and low vision/hearing    Vitals  Temperature: 36.7 °C (98 °F)  Temp src: Oral  Pulse: 61  Respiration: 18  Blood Pressure : 129/80  Blood Pressure MAP (Calculated): 96 MM HG  BP Location: Right, Upper Arm  Patient BP Position: Supine     Oxygen  Pulse Oximetry: 96 %  O2 (LPM): 0  O2 Delivery Device: None - Room Air    Bowel and Bladder  Last Bowel Movement  03/25/25  Stool Type     Bowel Device  Bathroom  Continent  Bladder: Did not void   Bowel: No movement  Bladder Function  Urine Void (mL): 300 ml  Number of Times Voided: 1  Urine Color: Yellow  Genitourinary Assessment   Bladder Assessment (WDL):  Within Defined Limits  Urine Color: Yellow  Bladder Device: Bathroom  Bladder Scan: Post Void  $ Bladder Scan Results (mL): 34    Skin  Dereje Score   19  Sensory Interventions   Bed Types: Standard/Trauma Mattress  Skin Preventative Measures: Pillows in Use  for Support / Positioning  Moisture Interventions         Pain  Pain Rating Scale  0 - No Pain  Pain Location  Head  Pain Location Orientation  Inner  Pain Interventions   Declines    ADLs    Bathing   Shower, Staff, Family / Significant Other  Linen Change   Partial  Personal Hygiene     Chlorhexidine Bath      Oral Care     Teeth/Dentures     Shave     Nutrition Percentage Eaten  Lunch, Between 25-50% Consumed  Environmental Precautions  Treaded Slipper Socks on Patient, Personal Belongings, Wastebasket, Call Bell etc. in Easy Reach, Bed in Low Position  Patient Turns/Positioning  Patient turns self independently side to side without assistance, to offload sacral area  Patient Turns Assistance/Tolerance  Standby Assist  Bed Positions  Bed Controls On, Bed Locked  Head of Bed Elevated  Self regulated      Psychosocial/Neurologic Assessment  Psychosocial Assessment  Psychosocial (WDL):  WDL Except  Patient Behaviors: Forgetful  Family Behaviors: Family Present, Anxious  Neurologic Assessment  Neuro (WDL): Exceptions to WDL  Level of Consciousness: Alert  Orientation Level: Disoriented to time  Cognition: Follows commands, Poor attention/concentration, Appropriate safety awareness  Speech: Clear  Pupil Assesment: No  Motor Function/Sensation Assessment: Motor strength  Muscle Strength Right Arm: Good Strength Against Gravity and Moderate Resistance  Muscle Strength Left Arm: Good Strength Against Gravity and Moderate Resistance  Muscle Strength Right Leg: Good Strength Against Gravity and Moderate Resistance  Muscle Strength Left Leg: Good Strength Against Gravity and Moderate Resistance  EENT (WDL):  WDL Except    Cardio/Pulmonary Assessment  Edema      Respiratory Breath Sounds  RUL Breath Sounds: Clear  RML Breath Sounds: Clear  RLL Breath Sounds: Diminished  ZACH Breath Sounds: Clear  LLL Breath Sounds: Diminished  Cardiac Assessment   Cardiac (WDL):  Within Defined Limits

## 2025-03-27 NOTE — CARE PLAN
Problem: Bowel Elimination  Goal: Patient will participate in bowel management program  Note: Scheduled senna given at hs.Continent of bowel.LBM 3/25.Will continue to monitor.

## 2025-03-27 NOTE — THERAPY
Speech Language Pathology  Daily Treatment     Patient Name:  Lico Caba  Age:  74 y.o., Sex:  male  Medical Record #:  [id number]  Today's Date: 3/27/2025    Precautions  Precautions: Fall Risk, Cognition/Communication  Fall Risk: Poor balance, Low vision, Visual spatial neglect/Neglect program  Cognition/Communication: Expressive Aphasia  Swallow: Therapeutic dining  Comments: R visual field cut    Subjective: The pt was found awake in bed and was agreeable to participate in ST session. The pt continues to be motivated to return to OF.     Objective:    03/27/25 1301   SLP Charge Group   SLP Treatment - Individual Speech Language Treatment - Individual   SLP Total Time Spent   SLP Individual Total Time Spent (Mins) 60   Precautions   Precautions Fall Risk;Cognition/Communication   Fall Risk Poor balance;Low vision;Visual spatial neglect/Neglect program   Cognition/Communication Expressive Aphasia   Swallow Therapeutic dining   Comments R visual field cut   Impairment Assessments   Impairment Assessments Comprehension;Expression;Social Interaction;Problem Solving;Memory;Dysphagia   Comprehension Level of Assist Minimal Assist   Comprehension Description Cueing needed;Extra time needed   Expression Level of Assist Moderate Assist   Expression Description Set up of equipment;Extra time needed;Cueing needed   Social Interaction Level of Assist Modified Independent   Social Interaction Description Extra time needed   Problem Solving Level of Assist Moderate Assist;Maximal Assist   Problem Solving Description Cueing needed;Extra time needed   Memory Level of Assist Moderate Assist   Memory Description Cueing needed;Extra time needed   Interdisciplinary Plan of Care Collaboration   IDT Collaboration with  Nursing   Patient Position at End of Therapy Seated;Chair Alarm On;Self Releasing Lap Belt Applied;Call Light within Reach;Tray Table within Reach   Collaboration Comments RN communicated pt stated  "therapy goal with SLP         Expressive Language  Purpose: to improve confidence in verbal expression across different environments (i.e. home, work, school, social settings, etc.), to improve the ability to verbally express thoughts, needs, emotions, and ideas in everyday communication, and to improve the use of appropriate vocabulary, sentence structure, and grammar  Interventions:   Naming  Expressive language deficits characterized by: Perseverations, Word Finding Deficits (Anomia), and Phonemic Paraphasias     Written Language Expression  Purpose: to improve the ability to express information through written communication and using letter tiles to spell target words  Interventions:   Copying  Spontaneous word level  Barriers to Written Language Expression: Spelling accuracy and Perseveration    The pt completed picture naming tasks achieving 0/5 independently. He achieved 0/2 for transcription of target words \"wall\" and \"ice\".    Assessment:     The pt required overall mod-max A for picture naming and transcription of target words. He benefited from semantic, first letter, and sentence completion cues for picture naming tasks. The pt did not independently transcribe target words correctly requiring first letter orthographic cues and scrambled word cues (IEC for target word ICE). Notably, he continues to present with increased accuracy in speech production (less filler words) and self corrects/ID errors. The pt exhibits strengths in identification of errors in spelling tasks and spontaneous speech production (pt stated \"That's the best of my worries\" and ind. sc to That's the least of my worries\"). Additionally, he demonstrates strengths in automatic speech at the conversational level (\"I'm just being facetious\" after saying joke to SLP.).    Strengths: Able to follow instructions, Pleasant and cooperative, Supportive family, Willingly participates in therapeutic activities, Motivated for self care and " independence  Barriers: Aphasia expressive, Aphasia receptive, Impaired functional cognition, Visual impairment    Plan:  Continue picture naming/spelling tasks   Initiate targeting number recognition       Speech Therapy Problems (Active)       Problem: Expression STGs       Dates: Start:  03/22/25         Goal: STG-Within one week, patient will       Dates: Start:  03/22/25       Description: 1) Individualized goal:  describe selected vocabulary by semantic features with 80% acc and MIN A.   2) Interventions:  SLP Speech Language Treatment, SLP Self Care / ADL Training , and SLP Group Treatment          Goal Note filed on 03/25/25 0808 by Alyse Anderson MS,CCC-SLP       Patient names pictures with 56% acc increasing to 80% with sentence completion cue.  Patient needs mod and occasionally max cues to implement use of semantic features to improve word finding.                 Problem: Memory STGs       Dates: Start:  03/22/25         Goal: STG-Within one week, patient will       Dates: Start:  03/22/25       Description: 1) Individualized goal:  complete working memory tasks w/ 80% accuracy and MIN A.   2) Interventions:  SLP Self Care / ADL Training , SLP Cognitive Skill Development, and SLP Group Treatment          Goal Note filed on 03/24/25 1512 by Alyse Anderson MS,CCC-SLP       To be addressed.                  Problem: Problem Solving STGs       Dates: Start:  03/22/25         Goal: STG-Within one week, patient will       Dates: Start:  03/22/25       Description: 1) Individualized goal:  complete right scanning/neglect tasks w/ MIN A and 80% acc.   2) Interventions:  SLP Self Care / ADL Training , SLP Cognitive Skill Development, and SLP Group Treatment          Goal Note filed on 03/24/25 1512 by Alyse Anderson MS,CCC-SLP       Patient needs min to mod cues for 80% acc in environ mental scans related to eating.  Continue goal.                 Problem: Social Interaction STGs       Dates: Start:  03/22/25          Goal: STG-Within one week, patient will       Dates: Start:  03/22/25               Problem: Speech/Swallowing LTGs       Dates: Start:  03/22/25         Goal: LTG-By discharge, patient will safely swallow       Dates: Start:  03/22/25       Description: 1) Individualized goal:  regular textures and thin liquids implementing safe swallow strategies in >90% of opportunities with MOD I for a safe D/C to PLOF.   2) Interventions:  SLP Swallowing Dysfunction Treatment, SLP Self Care / ADL Training , and SLP Group Treatment             Goal: LTG-By discharge, patient will solve basic problems       Dates: Start:  03/22/25       Description: 1) Individualized goal:  w/ 80% acc and MOD I for a safe D/C to PLOF.   2) Interventions:  SLP Speech Language Treatment, SLP Self Care / ADL Training , SLP Cognitive Skill Development, and SLP Group Treatment                Problem: Swallowing STGs       Dates: Start:  03/22/25         Goal: STG-Within one week, patient will       Dates: Start:  03/22/25

## 2025-03-27 NOTE — CARE PLAN
"The patient is Stable - Low risk of patient condition declining or worsening    Shift Goals  Clinical Goals: Comfort and safety  Patient Goals: Comfort    Progress made toward(s) clinical / shift goals:    Problem: Knowledge Deficit - Standard  Goal: Patient and family/care givers will demonstrate understanding of plan of care, disease process/condition, diagnostic tests and medications  Outcome: Progressing     Problem: Fall Risk - Rehab  Goal: Patient will remain free from falls  Outcome: Progressing  Note: Genesis Elizondo Fall risk Assessment Score: 21    High fall risk Interventions   - Bed and strip alarm   - Yellow sign by the door   - Yellow wrist band \"Fall risk\"  - Room near to the nurse station  - Do not leave patient unattended in the bathroom  - Fall risk education provided     Problem: Discharge Barriers/Planning  Goal: Patient's continuum of care needs are met  Outcome: Progressing     Problem: Psychosocial  Goal: Patient's level of anxiety will decrease  Outcome: Progressing     Problem: Hemodynamics  Goal: Patient's hemodynamics, fluid balance and neurologic status will be stable or improve  Outcome: Progressing     Problem: Risk for Aspiration  Goal: Patient's risk for aspiration will be absent or decrease  Outcome: Progressing     Problem: Bladder / Voiding  Goal: Patient will establish and maintain regular urinary output  Outcome: Progressing       Patient is not progressing towards the following goals:      "

## 2025-03-27 NOTE — THERAPY
Physical Therapy   Daily Treatment     Patient Name:  Lico Caba  Age:  74 y.o., Sex:  male  Medical Record #:  [id number]  Today's Date: 3/27/2025     Precautions  Precautions: Fall Risk, Cognition/Communication  Fall Risk: Poor balance, Low vision, Visual spatial neglect/Neglect program  Cognition/Communication: Expressive Aphasia  Swallow: Therapeutic dining  Comments: R visual field cut    Subjective: Pt ready for therapy , up in w/c.  Denies changes or concerns.    Objective:    03/27/25 1531   PT Charge Group   PT Neuromuscular Re-Education / Balance (Units) 2   PT Therapeutic Activities (Units) 1   PT Total Time Spent   PT Individual Total Time Spent (Mins) 45   Sit to Lying   Level of Assist Independent   Chair/Bed-to-Chair Transfer   Level of Assist Supervised   Toilet Transfer   Level of Assist Stand By Assist   Ding Balance Scale   Sitting Unsupported (Score 0-4) 4   Change Of Positon: Sitting To Standing (Score 0-4) 4   Change Of Positon: Standing To Sitting (Score 0-4) 4   Transfers (Score 0-4) 4   Standing Unsupported (Score 0-4) 4   Standing With Eyes Closed (Score 0-4) 4   Standing With Feet Together (Score 0-4) 4   Tandem Standing (Score 0-4) 3   Standing On One Leg (Score 0-4) 3   Turning Trunk (Feet Fixed) (Score 0-4) 2   Retrieving Objects From Floor (Score 0-4) 3   Turning 360 Degrees (Score 0-4) 2   Stool Stepping (Score 0-4) 2   Reaching Forward While Standing (Score 0-4) 3   Ding Balance Total Score (0-56) 46         Therapeutic Activities  Purpose: to improve performance and function of daily activities  Interventions:   Toilet transfer amb in room to BR SBA no AD.  Performed toileting in standing with urinal    Assessment: 46/56 on Ding, limited by R side attention, unable to demo low trunk rotation with turning to look behind & 360 turn limited by speed and comprehension of task. May benefit from a gait outcome measure to assess safety d/t R side inatteniton & cog, now that he  has progressed statically.    Strengths: Adequate strength, Independent prior level of function, Making steady progress towards goals, Motivated for self care and independence, Pleasant and cooperative, Supportive family, Willingly participates in therapeutic activities  Barriers: Aphasia expressive, Aphasia receptive, Hearing impairment, Impaired balance, Impaired insight/denial of deficits, Impaired functional cognition, Visual impairment    Plan:   As per Primary POC. FGA, community outing, floor transfer    DME    PT DME Recommendations  Assistive Device:  (none anticipated)      Passport items to be completed:  Get in/out of bed safely, in/out of a vehicle, safely use mobility device, walk or wheel around home/community, navigate up and down stairs, show how to get up/down from the ground, ensure home is accessible, demonstrate HEP, complete caregiver training    Physical Therapy Problems (Active)       Problem: Mobility       Dates: Start:  03/22/25         Goal: STG-Within one week, patient will ambulate 300ft SBA no device with moderate  verbal cueing to scan right       Dates: Start:  03/22/25         Goal Note filed on 03/25/25 0809 by Yunior Staples, PT       SBA-CGA                 Problem: PT-Long Term Goals       Dates: Start:  03/22/25         Goal: LTG-By discharge, patient will improve score on Ding balance assessment to >/= 41/56 to indicate low fall risk       Dates: Start:  03/22/25            Goal: LTG-By discharge, patient will ambulate 500ft SPV no device       Dates: Start:  03/22/25            Goal: LTG-By discharge, patient will transfer one surface to another independently       Dates: Start:  03/22/25            Goal: LTG-By discharge, patient will participate in community outing with minimal verbal cueing to scan right in novel environment and no physical assistance required for mobility       Dates: Start:  03/22/25

## 2025-03-27 NOTE — PROGRESS NOTES
"  Physical Medicine & Rehabilitation Progress Note  _____________________________________  Interdisciplinary Team Conference   Most recent IDT on 3/25/2025     Discharge Date/Disposition:  4/3/25  _____________________________________   Encounter Date: 3/27/2025    Chief Complaint: Weakness    Interval Events (Subjective):  VS WNL.  Last BM 3/25. Voiding urine.   No new labs today.  Missed doses of scheduled meds: none.   PRNs used: fioricet    Patient seen at bedside today. Has intermittent headache, fioricet is helpful, ho headache currently. Participating well.       Objective:  VITAL SIGNS: /76   Pulse 66   Temp 36.8 °C (98.2 °F) (Oral)   Resp 18   Ht 1.854 m (6' 1\")   Wt 81.5 kg (179 lb 10.8 oz)   SpO2 97%   BMI 23.71 kg/m²   Gen: No acute distress, well developed well nourished adult  HEENT: Normal Cephalic Atraumatic, Normal conjunctiva.   CV: warm extremities, well perfused  Resp: symmetric chest rise, breathing comfortably on room air  Abd: Soft, Non distended  Extremities: normal bulk, no atrophy  Skin: no visible rashes or lesions.   Neuro: alert, awake  Psych: Mood and affect appropriate and congruent    Laboratory Values:  Recent Results (from the past 72 hours)   EKG    Collection Time: 25  7:23 PM   Result Value Ref Range    Report       [facility]    Test Date:  2025  Pt Name:    SHABBIR MIRANDA           Department: [facility]  MRN:        [id number]                      Room:       [facility]  Gender:     Male                         Technician: [id number]  [facility]  :        [id number]                   Requested By:SAVITA GOODMAN  Order #:    [id number]                    Reading MD: Bj Azul MD    Measurements  Intervals                                Axis  Rate:       57                           P:          51  ME:         193                          QRS:        70  QRSD:       107                          T:          60  QT:         422  QTc:        411    Interpretive " Statements  Sinus bradycardia  Atrial premature complex  Borderline repolarization abnormality  Compared to ECG 03/21/2025 13:27:04  Atrial premature complex(es) now present  Sinus rhythm no longer present  Intraventricular conduction delay no longer present  ST (T wave) deviation no longer present  Electronically Signed On 03- 19:23:41 P DT by Bj Azul MD     CBC WITHOUT DIFFERENTIAL    Collection Time: 03/25/25  5:27 AM   Result Value Ref Range    WBC 10.8 4.8 - 10.8 K/uL    RBC 4.81 4.70 - 6.10 M/uL    Hemoglobin 14.8 14.0 - 18.0 g/dL    Hematocrit 43.9 42.0 - 52.0 %    MCV 91.3 81.4 - 97.8 fL    MCH 30.8 27.0 - 33.0 pg    MCHC 33.7 32.3 - 36.5 g/dL    RDW 44.1 35.9 - 50.0 fL    Platelet Count 260 164 - 446 K/uL    MPV 9.8 9.0 - 12.9 fL   Comp Metabolic Panel    Collection Time: 03/25/25  5:27 AM   Result Value Ref Range    Sodium 137 135 - 145 mmol/L    Potassium 4.3 3.6 - 5.5 mmol/L    Chloride 101 96 - 112 mmol/L    Co2 22 20 - 33 mmol/L    Anion Gap 14.0 7.0 - 16.0    Glucose 83 65 - 99 mg/dL    Bun 17 8 - 22 mg/dL    Creatinine 0.80 0.50 - 1.40 mg/dL    Calcium 8.4 (L) 8.5 - 10.5 mg/dL    Correct Calcium 8.8 8.5 - 10.5 mg/dL    AST(SGOT) 24 12 - 45 U/L    ALT(SGPT) 79 (H) 2 - 50 U/L    Alkaline Phosphatase 81 30 - 99 U/L    Total Bilirubin 0.5 0.1 - 1.5 mg/dL    Albumin 3.5 3.2 - 4.9 g/dL    Total Protein 5.9 (L) 6.0 - 8.2 g/dL    Globulin 2.4 1.9 - 3.5 g/dL    A-G Ratio 1.5 g/dL   ESTIMATED GFR    Collection Time: 03/25/25  5:27 AM   Result Value Ref Range    GFR (CKD-EPI) 92 >60 mL/min/1.73 m 2       Medications:  Scheduled Medications   Medication Dose Frequency    Pharmacy Consult Request  1 Each PHARMACY TO DOSE    senna-docusate  2 Tablet Q EVENING    omeprazole  20 mg DAILY    levETIRAcetam  500 mg BID     PRN medications: acetaminophen, Respiratory Therapy Consult, hydrALAZINE, senna-docusate **AND** polyethylene glycol/lytes, ondansetron **OR** ondansetron, traZODone, sodium chloride,  butalbital/apap/caffeine, oxyCODONE immediate-release    Diet:  Current Diet Order   Procedures    Diet Order Diet: Regular (T-dine, meats cut up)       Medical Decision Making and Plan:  CVA:  Visual Agnosia  Right hemianopsia  Impaired attention  -Patient with L hemorraghic stroke side/type of stroke on 3/16 date. Etiology likely hypertensive. Received no intervention  -Continue ASA and statin for secondary stroke prophylaxis.  -on keppra 500mg BID  -EKG 3/24 show no qtc, consider aricept    Bilateral lower extremity edema  -3/25 - dopplers to rule out blood clot  -continue ambulation as much a possible  -elevate legs in bed    Leukocytosis -resolved  -likely due to prednisone     Cognitive Communicative deficits:  -patient with significant aphasia, poor word finding, and cognitive impairment.   -Environmental modifications to decrease excessive stimulation including turning off the lights  -Consider starting neurostimulants such Nuvigil, amantadine or methylphenidate  -SLP to evaluate and treat cognitive deficits.   -Neuropsych will follow and perform testing if/when appropriate.     Hemiparesis:  -Patient with R dom hemiparesis  -consider starting SSRI per FLAME trial to facilitate post-stroke motor recovery  -Continue aggressive therapies with PT and OT to strengthen and optimize function.     Spasticity:  -Patient at risk of tone in the affected limbs/muscles  -PT and OT to initiate aggressive stretching, ROM exercises, bracing, splinting, casting and positioning as appropriate.  -If the tone fails to improve with the above conservative measures, consider further intervention with botulinum toxin or phenol injections.     Dysphagia/Nutrition:  -Patient currently on dysphagia DIET.  -3/24: regular texture, T-dine, meats cut up  -Continue therapies with SLP. SLP to perform swallow evaluation and provide oral motor exercises if necessary.     Neurogenic bladder:  -Timed voids with PVR q4H x3. If PVR > 400mL or if  patient is unable to void, straight cath patient.     Neurogenic bowel:  -Colace, Senna BID on admission  -Goal of 1BM/day.  -Last BM: 03/25     Circadian Rhythm disorder:   -Trazadone 50mg PRN for restlessness after 10pm  Recommend lights on during the day/off at night, minimize nighttime interruptions as able.     Mood  -at risk of adjustment disorder, depression, and anxiety due to functional decline     ID:  -at risk for Urinary tract infection     Skin/Wounds:  -Pressure relief q2h while in bed. Close monitoring for signs of breakdown     Pain:  - Neuroceptic - On Tylenol prn, fiorecet, prednisone x 4 days completed. Continue tylenol and fiorecet. 3/26: change tylenol to prn per patient preference.      DVT prophylaxis:  continues to be contraindicated. Dopplers ordered     GI prophylaxis:  On Prilosec 20mg daily         -Follow-up Neurology, PCP    ____________________________________    Ezekiel Alvarez D.O.  Physical Medicine & Rehabilitation   ____________________________________    Total time:  40 minutes.

## 2025-03-27 NOTE — THERAPY
"Physical Therapy   Daily Treatment     Patient Name:  Lico Caba  Age:  74 y.o., Sex:  male  Medical Record #:  [id number]  Today's Date: 3/27/2025     Precautions  Precautions: Fall Risk, Cognition/Communication  Fall Risk: Poor balance, Low vision, Visual spatial neglect/Neglect program  Cognition/Communication: Expressive Aphasia  Swallow: Therapeutic dining  Comments: R visual field cut    Subjective: Feeling more confident with mobility    Objective:    03/27/25 0930   PT Charge Group   PT Gait Training (Units) 1   PT Therapeutic Activities (Units) 1   PT Total Time Spent   PT Individual Total Time Spent (Mins) 30   Bowel   Bowel Device Bathroom   Last BM 03/27/25   Stool Description   (not witnessed, spouse assisted)   Sit to Stand   Level of Assist Stand By Assist   Assistive Devices No AD;FWW   Chair/Bed-to-Chair Transfer   Level of Assist Stand By Assist   Transfer Type Stand Step Transfer   Additional Description Cueing needed  (cueign for safety)   Ambulation   Level of Assist Stand By Assist;Contact Guard Assist   Assistive Device No AD;FWW   Additional Description Cueing needed;Extra time needed   Distance 180ejx4   Stairs   Level of Assist Stand By Assist   Stair Height 6\" step   Additional Description   (L HR and no HR)   Stairs Amount 6   Interdisciplinary Plan of Care Collaboration   IDT Collaboration with  Family / Caregiver   Patient Position at End of Therapy Seated;Self Releasing Lap Belt Applied;Family / Friend in Room   Collaboration Comments discussion of plan of care with pt and spouse         Therapeutic Activities  Purpose: to provide patient and family education  Interventions:   Patient or family education- supervision of spouse assisting with toileting, pants change, and hand hygiene. Able to complete safely    Gait Training  Purpose: to improve functional ambulation and to address gait deviations  Interventions:   Outdoor/Community ambulation  Walking endurance  Ambulation " around obstacles  Scanning right and path finding      Assessment: Shannan demonstrated improved consistency with step length and improved dynamic balance while ambulating today. He continues to require moderate to maximal cueing for scanning right with path negotiation and to avoid obstacles.    Strengths: Adequate strength, Independent prior level of function, Making steady progress towards goals, Motivated for self care and independence, Pleasant and cooperative, Supportive family, Willingly participates in therapeutic activities  Barriers: Aphasia expressive, Aphasia receptive, Hearing impairment, Impaired balance, Impaired insight/denial of deficits, Impaired functional cognition, Visual impairment    Plan:   Ambulation with no AD (FT with spouse for gait no device when appropriate, cleared for FWW with spouse in halls)  R sided visual scanning/compensatory strategies   Dynamic standing balance   Reassess Ding  Community re-integration    DME    PT DME Recommendations  Assistive Device:  (none anticipated)      Passport items to be completed:  Get in/out of bed safely, in/out of a vehicle, safely use mobility device, walk or wheel around home/community, navigate up and down stairs, show how to get up/down from the ground, ensure home is accessible, demonstrate HEP, complete caregiver training    Physical Therapy Problems (Active)       Problem: Mobility       Dates: Start:  03/22/25         Goal: STG-Within one week, patient will ambulate 300ft SBA no device with moderate  verbal cueing to scan right       Dates: Start:  03/22/25         Goal Note filed on 03/25/25 0809 by Yunior Staples, PT       SBA-CGA                 Problem: PT-Long Term Goals       Dates: Start:  03/22/25         Goal: LTG-By discharge, patient will improve score on Ding balance assessment to >/= 41/56 to indicate low fall risk       Dates: Start:  03/22/25            Goal: LTG-By discharge, patient will ambulate 500ft SPV no device        Dates: Start:  03/22/25            Goal: LTG-By discharge, patient will transfer one surface to another independently       Dates: Start:  03/22/25            Goal: LTG-By discharge, patient will participate in community outing with minimal verbal cueing to scan right in novel environment and no physical assistance required for mobility       Dates: Start:  03/22/25

## 2025-03-27 NOTE — THERAPY
Occupational Therapy  Daily Treatment     Patient Name:  Lico Caba  Age:  74 y.o., Sex:  male  Medical Record #:  [id number]  Today's Date:  3/27/2025    Precautions  Precautions: Fall Risk, Cognition/Communication  Fall Risk: Poor balance, Low vision, Visual spatial neglect/Neglect program  Cognition/Communication: Expressive Aphasia  Swallow: Therapeutic dining  Comments: R visual field cut    Subjective: Pt seated in w/c upon arrival, pleasant and cooperative, agreeable to therapy. Spouse present throughout     Objective:    03/27/25 0831   OT Charge Group   OT Therapy Activity (Units) 4   OT Total Time Spent   OT Individual Total Time Spent (Mins) 60   Toilet Transfer   Level of Assist Contact Guard Assist  (with spouse assist)   Transfer Type Stand Step Transfer   Adaptive Equipment Grab bars;3-in-1 Commode   Assistive Device Wheelchair   Additional Description Cueing needed;Extra time needed   Toileting Hygiene   Level of Assist Minimal Assist  (with spouse assist)   Additional Description Grab bars;Cueing needed;Assist to pull pants up   Interdisciplinary Plan of Care Collaboration   Patient Position at End of Therapy Seated;Call Light within Reach;Tray Table within Reach  (handoff to spouse)     Therapeutic Activities  Purpose: to improve performance and function of daily activities, to provide patient and family education, and to increase safety with activities of daily life and mobility related to activities of daily life  Interventions:  Functional mobility at FWW level: CGA room<>bathroom, indoors throughout facility. Pt self-corrected and recognized when turning in wrong direction with L/R commands  FMC: removing beads from yellow theraputty to address RUE ataxia    Vision  Purpose: to improve visual acuity, to improve oculomotor control , and to improve safety with use of scanning strategies to compensate for visual neglect  Interventions:   Convergence: inserting small straw pieces  through larger straw at near point; stringing beads at table - beads have letters which pt was able to state correctly (20% of time without prompts)  Scanning: grocery shelves - pt able to locate items with significant increase in time, but searching L-R pattern more effective and thorough   Line bisection: cutting large yellow theraputty into small pieces and making correct midline cuts with fork and knife (used utensils appropriately)    Assessment:    Spouse continues to assist pt appropriately in the bathroom with toileting and when walking with pt in hallways at FWW level - proper use of gait belt, effective prompting for safety and to promote independence observed. Pt demo improve recognition of L/R directional errors, and demo a more effective and thorough compensatory visual scanning pattern without having to be prompted    Strengths: Independent prior level of function, Making steady progress towards goals, Motivated for self care and independence, Pleasant and cooperative, Supportive family, Willingly participates in therapeutic activities  Barriers: Aphasia expressive, Aphasia receptive, Confused, Decreased endurance, Difficulty following instructions, Fatigue, Generalized weakness, Impaired activity tolerance, Impaired balance, Impaired carryover of learning, Impaired insight/denial of deficits, Impaired functional cognition, Impulsive, Limited mobility, Visual impairment    Plan:  R visual scanning and safety awareness, RUE neuro re-ed (ataxia), functional cognition (memory, aphasia, apraxia, attention, problem solving), overall strength/endurance and standing balance     Spouse cleared to ambulate with pt at FWW level     Lives with spouse, 1 story, 1 CAROL ANN, WIS, tub    Occupational Therapy Goals (Active)       Problem: Bathing       Dates: Start:  03/22/25         Goal: STG-Within one week, patient will bathe at SBA level.       Dates: Start:  03/22/25         Goal Note filed on 03/24/25 1440 by Kaya  FLYNN Blackburn, MS,OTR/L       Not yet addressed                 Problem: Dressing       Dates: Start:  03/22/25         Goal: STG-Within one week, patient will dress LB at SBA level.       Dates: Start:  03/22/25         Goal Note filed on 03/24/25 1440 by Kaya Blackburn, MS,OTR/L       Not yet addressed                 Problem: Functional Transfers       Dates: Start:  03/22/25         Goal: STG-Within one week, patient will transfer to toilet at SBA level.       Dates: Start:  03/22/25         Goal Note filed on 03/24/25 1440 by Kaya Blackburn MS,OTR/L       Requires CGA              Goal: STG-Within one week, patient will transfer to tub/shower at SBA level.       Dates: Start:  03/22/25         Goal Note filed on 03/24/25 1440 by Kaya Blackburn MS,OTR/L       Not yet addressed                 Problem: OT Long Term Goals       Dates: Start:  03/22/25         Goal: LTG-By discharge, patient will complete basic self care tasks at SPV-Mod I level.       Dates: Start:  03/22/25            Goal: LTG-By discharge, patient will perform bathroom transfers at SPV-Mod I level.       Dates: Start:  03/22/25               Problem: Toileting       Dates: Start:  03/22/25         Goal: STG-Within one week, patient will complete toileting tasks at SBA level.       Dates: Start:  03/22/25         Goal Note filed on 03/24/25 1440 by Kaya Blackburn MS,OTR/L       Not yet addressed

## 2025-03-28 ENCOUNTER — APPOINTMENT (OUTPATIENT)
Dept: OCCUPATIONAL THERAPY | Facility: REHABILITATION | Age: 75
DRG: 057 | End: 2025-03-28
Attending: PHYSICAL MEDICINE & REHABILITATION
Payer: COMMERCIAL

## 2025-03-28 ENCOUNTER — APPOINTMENT (OUTPATIENT)
Dept: SPEECH THERAPY | Facility: REHABILITATION | Age: 75
DRG: 057 | End: 2025-03-28
Attending: PHYSICAL MEDICINE & REHABILITATION
Payer: COMMERCIAL

## 2025-03-28 ENCOUNTER — APPOINTMENT (OUTPATIENT)
Dept: PHYSICAL THERAPY | Facility: REHABILITATION | Age: 75
DRG: 057 | End: 2025-03-28
Attending: PHYSICAL MEDICINE & REHABILITATION
Payer: COMMERCIAL

## 2025-03-28 LAB
ALBUMIN SERPL BCP-MCNC: 3.5 G/DL (ref 3.2–4.9)
ALBUMIN/GLOB SERPL: 1.4 G/DL
ALP SERPL-CCNC: 102 U/L (ref 30–99)
ALT SERPL-CCNC: 83 U/L (ref 2–50)
ANION GAP SERPL CALC-SCNC: 10 MMOL/L (ref 7–16)
AST SERPL-CCNC: 35 U/L (ref 12–45)
BASOPHILS # BLD AUTO: 0.3 % (ref 0–1.8)
BASOPHILS # BLD: 0.03 K/UL (ref 0–0.12)
BILIRUB SERPL-MCNC: 0.4 MG/DL (ref 0.1–1.5)
BUN SERPL-MCNC: 14 MG/DL (ref 8–22)
CALCIUM ALBUM COR SERPL-MCNC: 8.8 MG/DL (ref 8.5–10.5)
CALCIUM SERPL-MCNC: 8.4 MG/DL (ref 8.5–10.5)
CHLORIDE SERPL-SCNC: 101 MMOL/L (ref 96–112)
CO2 SERPL-SCNC: 25 MMOL/L (ref 20–33)
CREAT SERPL-MCNC: 0.75 MG/DL (ref 0.5–1.4)
EOSINOPHIL # BLD AUTO: 0.2 K/UL (ref 0–0.51)
EOSINOPHIL NFR BLD: 1.9 % (ref 0–6.9)
ERYTHROCYTE [DISTWIDTH] IN BLOOD BY AUTOMATED COUNT: 44.2 FL (ref 35.9–50)
GFR SERPLBLD CREATININE-BSD FMLA CKD-EPI: 93 ML/MIN/1.73 M 2
GLOBULIN SER CALC-MCNC: 2.5 G/DL (ref 1.9–3.5)
GLUCOSE SERPL-MCNC: 93 MG/DL (ref 65–99)
HCT VFR BLD AUTO: 45.3 % (ref 42–52)
HGB BLD-MCNC: 15 G/DL (ref 14–18)
IMM GRANULOCYTES # BLD AUTO: 0.14 K/UL (ref 0–0.11)
IMM GRANULOCYTES NFR BLD AUTO: 1.3 % (ref 0–0.9)
LYMPHOCYTES # BLD AUTO: 2.62 K/UL (ref 1–4.8)
LYMPHOCYTES NFR BLD: 24.6 % (ref 22–41)
MCH RBC QN AUTO: 30.4 PG (ref 27–33)
MCHC RBC AUTO-ENTMCNC: 33.1 G/DL (ref 32.3–36.5)
MCV RBC AUTO: 91.9 FL (ref 81.4–97.8)
MONOCYTES # BLD AUTO: 1.07 K/UL (ref 0–0.85)
MONOCYTES NFR BLD AUTO: 10 % (ref 0–13.4)
NEUTROPHILS # BLD AUTO: 6.59 K/UL (ref 1.82–7.42)
NEUTROPHILS NFR BLD: 61.9 % (ref 44–72)
NRBC # BLD AUTO: 0 K/UL
NRBC BLD-RTO: 0 /100 WBC (ref 0–0.2)
PLATELET # BLD AUTO: 252 K/UL (ref 164–446)
PMV BLD AUTO: 9.4 FL (ref 9–12.9)
POTASSIUM SERPL-SCNC: 4.3 MMOL/L (ref 3.6–5.5)
PROT SERPL-MCNC: 6 G/DL (ref 6–8.2)
RBC # BLD AUTO: 4.93 M/UL (ref 4.7–6.1)
SODIUM SERPL-SCNC: 136 MMOL/L (ref 135–145)
WBC # BLD AUTO: 10.7 K/UL (ref 4.8–10.8)

## 2025-03-28 PROCEDURE — 97530 THERAPEUTIC ACTIVITIES: CPT

## 2025-03-28 PROCEDURE — 99232 SBSQ HOSP IP/OBS MODERATE 35: CPT | Performed by: STUDENT IN AN ORGANIZED HEALTH CARE EDUCATION/TRAINING PROGRAM

## 2025-03-28 PROCEDURE — 92507 TX SP LANG VOICE COMM INDIV: CPT

## 2025-03-28 PROCEDURE — 36415 COLL VENOUS BLD VENIPUNCTURE: CPT

## 2025-03-28 PROCEDURE — 700102 HCHG RX REV CODE 250 W/ 637 OVERRIDE(OP): Performed by: PHYSICAL MEDICINE & REHABILITATION

## 2025-03-28 PROCEDURE — 85025 COMPLETE CBC W/AUTO DIFF WBC: CPT

## 2025-03-28 PROCEDURE — 770010 HCHG ROOM/CARE - REHAB SEMI PRIVAT*

## 2025-03-28 PROCEDURE — 80053 COMPREHEN METABOLIC PANEL: CPT

## 2025-03-28 PROCEDURE — A9270 NON-COVERED ITEM OR SERVICE: HCPCS | Performed by: PHYSICAL MEDICINE & REHABILITATION

## 2025-03-28 RX ADMIN — SENNOSIDES AND DOCUSATE SODIUM 2 TABLET: 50; 8.6 TABLET ORAL at 21:11

## 2025-03-28 RX ADMIN — LEVETIRACETAM 500 MG: 500 TABLET, FILM COATED ORAL at 10:29

## 2025-03-28 RX ADMIN — LEVETIRACETAM 500 MG: 500 TABLET, FILM COATED ORAL at 21:11

## 2025-03-28 RX ADMIN — OMEPRAZOLE 20 MG: 20 CAPSULE, DELAYED RELEASE ORAL at 10:29

## 2025-03-28 ASSESSMENT — PAIN DESCRIPTION - PAIN TYPE
TYPE: ACUTE PAIN
TYPE: ACUTE PAIN

## 2025-03-28 NOTE — THERAPY
"Recreational Therapy  Daily Treatment     Patient Name: Lico Caba  AGE:  74 y.o., SEX:  male  Medical Record #: [id number]  Today's Date: 3/28/2025     Subjective: Patient was ready and willing to participate in session.     Objective:    03/28/25 1031   Treatment Time   Total Time Spent (mins) 30   Precautions   Precautions Fall Risk;Cognition/Communication   Fall Risk Poor balance;Low vision;Visual spatial neglect/Neglect program   Cognition/Communication Expressive Aphasia   Swallow Therapeutic dining   Comments R visual field cut   Patient Presentation   Attention Span Remains on Task   Affect Appropriate   Behavior Appropriate   Participation Level   Participation Level Full   Strengths & Barriers   Strengths Able to follow instructions;Independent prior level of function;Pleasant and cooperative;Supportive family;Willingly participates in therapeutic activities;Alert and oriented   Barriers Aphasia expressive   Interdisciplinary Plan of Care Collaboration   IDT Collaboration with  Family / Caregiver   Patient Position at End of Therapy Seated;Family / Friend in Room   Collaboration Comments Wife in session         Cognitive Skills  Purpose:Visual scanning   Interventions:   Jj.   Patient was taught a new game, that required him to scan his own tiles and find tiles on the table to play his tiles. Patient needed moderate assistance to play but when directed to look for something he could find it.     Assessment    Patient continues to have visual difficulties but is pleasant and willing to play new games to encourage him to scan. Wife was stressed that she may have made the \"wrong decision\" about home health vs. outpatient and said she was feeling overwhelmed. Encourage her to take some time for herself today. Patient said he had fun after the session.     Strengths: (P) Able to follow instructions, Independent prior level of function, Pleasant and cooperative, Supportive family, Willingly " participates in therapeutic activities, Alert and oriented  Barriers: (P) Aphasia expressive    Plan  Will continue to provide opportunities for patient to use visual scanning skills.     Passport items to be completed:  Verbalize two positive leisure activities, discuss returning to work, hobbies, community groups or volunteer activities, explore community resources

## 2025-03-28 NOTE — PROGRESS NOTES
"  Physical Medicine & Rehabilitation Progress Note  _____________________________________  Interdisciplinary Team Conference   Most recent IDT on 3/25/2025     Discharge Date/Disposition:  4/3/25  _____________________________________   Encounter Date: 3/28/2025    Chief Complaint: Weakness    Interval Events (Subjective):  VS mild bradycardia (59), otherwise wnl.  Last BM 3/27. Voiding urine.   Pertinent labs today: WBC 10.7, ALT 83  Missed doses of scheduled meds: none.   PRNs used: none    Patient seen at bedside today. Continues to participate well. No new complaints today. No headache recently.      Objective:  VITAL SIGNS: /70   Pulse (!) 59   Temp 37.1 °C (98.7 °F) (Temporal)   Resp 18   Ht 1.854 m (6' 1\")   Wt 81.5 kg (179 lb 10.8 oz)   SpO2 97%   BMI 23.71 kg/m²   Gen: No acute distress, well developed well nourished adult  HEENT: Normal Cephalic Atraumatic, Normal conjunctiva.   CV: warm extremities, well perfused  Resp: symmetric chest rise, breathing comfortably on room air  Abd: Soft, Non distended  Extremities: normal bulk, no atrophy  Skin: no visible rashes or lesions.   Neuro: alert, awake  Psych: Mood and affect appropriate and congruent    Laboratory Values:  Recent Results (from the past 72 hours)   Comp Metabolic Panel    Collection Time: 03/28/25  6:18 AM   Result Value Ref Range    Sodium 136 135 - 145 mmol/L    Potassium 4.3 3.6 - 5.5 mmol/L    Chloride 101 96 - 112 mmol/L    Co2 25 20 - 33 mmol/L    Anion Gap 10.0 7.0 - 16.0    Glucose 93 65 - 99 mg/dL    Bun 14 8 - 22 mg/dL    Creatinine 0.75 0.50 - 1.40 mg/dL    Calcium 8.4 (L) 8.5 - 10.5 mg/dL    Correct Calcium 8.8 8.5 - 10.5 mg/dL    AST(SGOT) 35 12 - 45 U/L    ALT(SGPT) 83 (H) 2 - 50 U/L    Alkaline Phosphatase 102 (H) 30 - 99 U/L    Total Bilirubin 0.4 0.1 - 1.5 mg/dL    Albumin 3.5 3.2 - 4.9 g/dL    Total Protein 6.0 6.0 - 8.2 g/dL    Globulin 2.5 1.9 - 3.5 g/dL    A-G Ratio 1.4 g/dL   CBC WITH DIFFERENTIAL    Collection " Time: 03/28/25  6:18 AM   Result Value Ref Range    WBC 10.7 4.8 - 10.8 K/uL    RBC 4.93 4.70 - 6.10 M/uL    Hemoglobin 15.0 14.0 - 18.0 g/dL    Hematocrit 45.3 42.0 - 52.0 %    MCV 91.9 81.4 - 97.8 fL    MCH 30.4 27.0 - 33.0 pg    MCHC 33.1 32.3 - 36.5 g/dL    RDW 44.2 35.9 - 50.0 fL    Platelet Count 252 164 - 446 K/uL    MPV 9.4 9.0 - 12.9 fL    Neutrophils-Polys 61.90 44.00 - 72.00 %    Lymphocytes 24.60 22.00 - 41.00 %    Monocytes 10.00 0.00 - 13.40 %    Eosinophils 1.90 0.00 - 6.90 %    Basophils 0.30 0.00 - 1.80 %    Immature Granulocytes 1.30 (H) 0.00 - 0.90 %    Nucleated RBC 0.00 0.00 - 0.20 /100 WBC    Neutrophils (Absolute) 6.59 1.82 - 7.42 K/uL    Lymphs (Absolute) 2.62 1.00 - 4.80 K/uL    Monos (Absolute) 1.07 (H) 0.00 - 0.85 K/uL    Eos (Absolute) 0.20 0.00 - 0.51 K/uL    Baso (Absolute) 0.03 0.00 - 0.12 K/uL    Immature Granulocytes (abs) 0.14 (H) 0.00 - 0.11 K/uL    NRBC (Absolute) 0.00 K/uL   ESTIMATED GFR    Collection Time: 03/28/25  6:18 AM   Result Value Ref Range    GFR (CKD-EPI) 93 >60 mL/min/1.73 m 2       Medications:  Scheduled Medications   Medication Dose Frequency    Pharmacy Consult Request  1 Each PHARMACY TO DOSE    senna-docusate  2 Tablet Q EVENING    omeprazole  20 mg DAILY    levETIRAcetam  500 mg BID     PRN medications: acetaminophen, Respiratory Therapy Consult, hydrALAZINE, senna-docusate **AND** polyethylene glycol/lytes, ondansetron **OR** ondansetron, traZODone, sodium chloride, butalbital/apap/caffeine, oxyCODONE immediate-release    Diet:  Current Diet Order   Procedures    Diet Order Diet: Regular (T-dine, meats cut up)       Medical Decision Making and Plan:  CVA:  Visual Agnosia  Right hemianopsia  Impaired attention  -Patient with L hemorraghic stroke side/type of stroke on 3/16 date. Etiology likely hypertensive. Received no intervention  -Continue ASA and statin for secondary stroke prophylaxis.  -on keppra 500mg BID  -EKG 3/24 show no qtc, consider  aricept    Bilateral lower extremity edema  -3/27- No DVT on US.   -continue ambulation as much a possible  -elevate legs in bed    Leukocytosis -resolved  -likely due to prednisone     Cognitive Communicative deficits:  -patient with significant aphasia, poor word finding, and cognitive impairment.   -Environmental modifications to decrease excessive stimulation including turning off the lights  -Consider starting neurostimulants such Nuvigil, amantadine or methylphenidate  -SLP to evaluate and treat cognitive deficits.   -Neuropsych will follow and perform testing if/when appropriate.     Hemiparesis:  -Patient with R dom hemiparesis  -consider starting SSRI per FLAME trial to facilitate post-stroke motor recovery  -Continue aggressive therapies with PT and OT to strengthen and optimize function.     Spasticity:  -Patient at risk of tone in the affected limbs/muscles  -PT and OT to initiate aggressive stretching, ROM exercises, bracing, splinting, casting and positioning as appropriate.  -If the tone fails to improve with the above conservative measures, consider further intervention with botulinum toxin or phenol injections.     Dysphagia/Nutrition:  -Patient currently on dysphagia DIET.  -3/24: regular texture, T-dine, meats cut up  -Continue therapies with SLP. SLP to perform swallow evaluation and provide oral motor exercises if necessary.     Neurogenic bladder:  -Timed voids with PVR q4H x3. If PVR > 400mL or if patient is unable to void, straight cath patient.     Neurogenic bowel:  -Colace, Senna BID on admission  -Goal of 1BM/day.     Circadian Rhythm disorder:   -Trazadone 50mg PRN for restlessness after 10pm  Recommend lights on during the day/off at night, minimize nighttime interruptions as able.     Mood  -at risk of adjustment disorder, depression, and anxiety due to functional decline     ID:  -at risk for Urinary tract infection     Skin/Wounds:  -Pressure relief q2h while in bed. Close monitoring  for signs of breakdown     Pain:  - Neuroceptic - On Tylenol prn, fiorecet, prednisone x 4 days completed. Continue tylenol and fiorecet. 3/26: change tylenol to prn per patient preference.      DVT prophylaxis:  continues to be contraindicated. Routine dopplers.      GI prophylaxis:  On Prilosec 20mg daily         -Follow-up Neurology, PCP    ____________________________________    Ezekiel Alvarez D.O.  Physical Medicine & Rehabilitation   ____________________________________    Total time:  40 minutes.

## 2025-03-28 NOTE — THERAPY
Speech Language Pathology  Daily Treatment     Patient Name:  Lico Caba  Age:  74 y.o., Sex:  male  Medical Record #:  [id number]  Today's Date: 3/28/2025    Precautions  Precautions: Fall Risk, Cognition/Communication  Fall Risk: Poor balance, Low vision, Visual spatial neglect/Neglect program  Cognition/Communication: Expressive Aphasia  Swallow: Therapeutic dining  Comments: R visual field cut    Subjective: The pt was found finishing PT vision assessment at start of ST and was agreeable to participate. Pt's SO present in room and discussed wanting pt to take a shower tomorrow. Pt was given 2.5 reading glasses to keep with a case which were left in pt room with SO.       Objective:    03/28/25 0931   Precautions   Precautions Fall Risk;Cognition/Communication   Fall Risk Poor balance;Low vision;Visual spatial neglect/Neglect program   Cognition/Communication Expressive Aphasia   Comments R visual field cut   Impairment Assessments   Impairment Assessments Comprehension;Expression;Social Interaction;Problem Solving;Memory;Dysphagia   Comprehension Level of Assist Minimal Assist   Comprehension Description Cueing needed;Extra time needed   Expression Level of Assist Moderate Assist   Expression Description Set up of equipment;Extra time needed;Cueing needed   Social Interaction Level of Assist Modified Independent   Social Interaction Description Extra time needed   Interdisciplinary Plan of Care Collaboration   IDT Collaboration with  Family / Caregiver;Physical Therapist   Patient Position at End of Therapy Seated;Chair Alarm On;Self Releasing Lap Belt Applied;Call Light within Reach;Tray Table within Reach;Family / Friend in Room   Collaboration Comments PT shared results of vision assessment with ST. SO present in room at end of ST.         Expressive Language  Purpose: to improve confidence in verbal expression across different environments (i.e. home, work, school, social settings, etc.), to  "improve the ability to verbally express thoughts, needs, emotions, and ideas in everyday communication, and to improve the use of appropriate vocabulary, sentence structure, and grammar  Interventions:   Naming  Expressive language deficits characterized by: Perseverations, Word Finding Deficits (Anomia), and Phonemic Paraphasias     Written Language Expression  Purpose: to improve the ability to express information through written communication  Interventions:   Copying  Spontaneous word level  Barriers to Written Language Expression: Spelling accuracy, Visual/perceptual deficit, and Perseveration    The pt completed 1 picture naming and spelling task achieving 0/1 independently.     Assessment:     The pt required overall mod A to name picture of target word \"rug\" (perseverated on \"mat\") and arranged letter tiles to assist in correct orthographic spelling of the word. He benefited from first letter and 3-letter word cues for naming and spelling with tiles. The pt continues to show insight into deficits and is motivated to return to [facility]. SLP provided tracing letters, numbers, and three-letter word activities for the pt to complete independently. The pt was in agreement with completing the activities independently and stated that he wanted more practice.     Strengths: Able to follow instructions, Pleasant and cooperative, Supportive family, Willingly participates in therapeutic activities, Motivated for self care and independence  Barriers: Aphasia expressive, Aphasia receptive, Impaired functional cognition, Visual impairment    Plan:  Initiate scanning tasks for R neglect using visual anchor   Check copying activities (located in pt room)   Continue with spelling and number tasks      Speech Therapy Problems (Active)       Problem: Expression STGs       Dates: Start:  03/22/25         Goal: STG-Within one week, patient will       Dates: Start:  03/22/25       Description: 1) Individualized goal:  describe " selected vocabulary by semantic features with 80% acc and MIN A.   2) Interventions:  SLP Speech Language Treatment, SLP Self Care / ADL Training , and SLP Group Treatment          Goal Note filed on 03/25/25 0808 by Alyse Anderson MS,CCC-SLP       Patient names pictures with 56% acc increasing to 80% with sentence completion cue.  Patient needs mod and occasionally max cues to implement use of semantic features to improve word finding.                 Problem: Memory STGs       Dates: Start:  03/22/25         Goal: STG-Within one week, patient will       Dates: Start:  03/22/25       Description: 1) Individualized goal:  complete working memory tasks w/ 80% accuracy and MIN A.   2) Interventions:  SLP Self Care / ADL Training , SLP Cognitive Skill Development, and SLP Group Treatment          Goal Note filed on 03/24/25 1512 by Alyse Anderson MS,CCC-SLP       To be addressed.                  Problem: Problem Solving STGs       Dates: Start:  03/22/25         Goal: STG-Within one week, patient will       Dates: Start:  03/22/25       Description: 1) Individualized goal:  complete right scanning/neglect tasks w/ MIN A and 80% acc.   2) Interventions:  SLP Self Care / ADL Training , SLP Cognitive Skill Development, and SLP Group Treatment          Goal Note filed on 03/24/25 1512 by Alyse Anderson MS,CCC-SLP       Patient needs min to mod cues for 80% acc in environ mental scans related to eating.  Continue goal.                 Problem: Social Interaction STGs       Dates: Start:  03/22/25         Goal: STG-Within one week, patient will       Dates: Start:  03/22/25               Problem: Speech/Swallowing LTGs       Dates: Start:  03/22/25         Goal: LTG-By discharge, patient will safely swallow       Dates: Start:  03/22/25       Description: 1) Individualized goal:  regular textures and thin liquids implementing safe swallow strategies in >90% of opportunities with MOD I for a safe D/C to PLOF.   2)  Interventions:  SLP Swallowing Dysfunction Treatment, SLP Self Care / ADL Training , and SLP Group Treatment             Goal: LTG-By discharge, patient will solve basic problems       Dates: Start:  03/22/25       Description: 1) Individualized goal:  w/ 80% acc and MOD I for a safe D/C to PLOF.   2) Interventions:  SLP Speech Language Treatment, SLP Self Care / ADL Training , SLP Cognitive Skill Development, and SLP Group Treatment                Problem: Swallowing STGs       Dates: Start:  03/22/25         Goal: STG-Within one week, patient will       Dates: Start:  03/22/25

## 2025-03-28 NOTE — THERAPY
"Physical Therapy   Daily Treatment  VISION ASSESSMENT    Patient Name:  Lico Caba  Age:  74 y.o., Sex:  male  Medical Record #:  [id number]  Today's Date: 3/28/2025     Precautions  Precautions: Fall Risk, Cognition/Communication  Fall Risk: Poor balance, Low vision, Visual spatial neglect/Neglect program  Cognition/Communication: Expressive Aphasia  Swallow: Therapeutic dining       03/28/25 0831   PT Charge Group   PT Therapeutic Activities (Units) 4   PT Total Time Spent   PT Individual Total Time Spent (Mins) 60       Subjective: \"They tell me I can't see so well on the right.\"     Vision Screening Tool    Diagnosis: left tempoparietal hemmorhage   Vision history:   Eye health: No significant eye health history reported.  Corrective lenses: yes - prescription and readers; has 1 pair progressive lenses, 1 pair \"cheaters\", 1 pair unknown  Last eye exam: ~ 2024    When did your prescription last change? ~2024   What type of contacts/glasses do you use? reading glasses and other: progressives     Subjective:  Changes in vision: yes - double vision (per pt)  and right side loss (per therapist)  Is vision blurry or unclear? yes  If yes, does it occur with looking at something: something close (book? phone?)  Do you have double vision? If yes, when does it happen? intermittent and close up   Do you have changes in your side vision?  yes and pt doesn't notice, but reports other people \"have told me\"   If yes, is your side vision different on the: right    Observations:   Mobility, performance and attention: Chart notes indicate R neglect, R suspected field cut   Squinting or straining to see? yes and horizontal head movement noted when focusing on eye chart   Moving an object closer/farther from face? no  Pupils:  WFL  Abnormal eye alignment/eye deviation: no  Pt closes one eye? no  Different pupil sizes? no  Gaze preference? no  Miss targets/objects? yes and per therapists- on right side       Vision " Testing Right Eye Left Eye B Eyes Notes   Acuity: Far 20/40 20/25; compensatory head movement noted  20/25 Aphasia tiles used; loses place when reading right side of line   Corrective lenses donned? Yes- progressive/lineless bifocals     Acuity: Near/reading  20/50 20/50 20/40 Effortful; aphasia tiles used  Corrective lenses donned? Yes- progressives     Visual Fields   Quadrant Test  Kinetic Test  abnormal abnormal  Errors on 2 of 10 trials c/ quadrant testing  Field cut? B eyes R lower quadrant          Ocular Motor Exam Right Eye Left Eye B Eyes Notes   Eyelid and Pupil Screening  Pupillary reflex, full opening/closing of eyelid normal normal  WFL   Ocular Fixation (Monocular & Binocular)  Midline  R/L/Sup/Inf  Eccentric Gaze abnormal abnormal abnormal Needs cues to isolate eye from head movement; some nyastagmus noted at horizontal end range   Ocular ROM  9 cardinal points (+/X)  Full ROM (iris behind lid)  normal normal normal Full ROM, needs help to keep head still + visual cue with target to move to end ROM   Ocular/Smooth Pursuits  Monocular  Binoncular abnormal; Moderate jumping, LOF x 1 abnormal; moderate jumping abnormal Moderate to max jumping, + compensatory head movement (unable to keep head still)   Ocular Saccades Horizontal  Monocular  Binocular  abnormal: overshoot to R abnormal: overshoot to R; speed faster compared to R eye abnormal Undershooting c/ B eyes open   Ocular Saccades Vertical  Monocular  Binocular abnormal; undershooting abnormal; undershooting abnormal undershooting   Vergence (5 trials)   not examined      Ocular Alignment   Corneal Reflection (Hirschberg) not examined not examined     Unilateral Cover Test (binocular) abnormal; R eye moves in normal     Alternate Cover Test (binocular, near)   abnormal + both eyes move in   Alternate Cover Test (binocular, far)   not examined      Vestibular Screening not examined    Gaze stability/VOR not examined    Accommodation not examined   "  VOR Cancel not examined    HIT not examined    VBI   not examined      Visuoperceptual neglect: Limited by aphasia  Extinction present with double visual simultaneous stimulation (confrontation testing).  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 Neglect testing via double simultaneous stimulation was equivocal due to expressive aphasia but may indicate mild R neglect as patient made 2 errors out of 10 trials on each eye. No sensory extinction present on exam. Pt responded positively to trial of 2.50+ readers. Recommend trying to locate other glasses to see if they are distance prescription vs current progressive lenses. See plan recommendations below.     Strengths: Adequate strength, Independent prior level of function, Making steady progress towards goals, Motivated for self care and independence, Pleasant and cooperative, Supportive family, Willingly participates in therapeutic activities  Barriers: Aphasia expressive, Aphasia receptive, Hearing impairment, Impaired balance, Impaired insight/denial of deficits, Impaired functional cognition, Visual impairment    Plan:   Vision Plan:  Goal: to support acuity, and promote strategies for R side awareness, and increase amplitude and accuracy of eye movements to support attention and reading   Acuity Correction: 2.50+ readers; pt reports he has 3 pairs of glasses, 1 pair of progressives, 1 \"cheaters\" and 1 unknown. Recommend  near and far lenses when possible. 2.50+ Readers for near work.  Preferred font size: > 12 pt due to field cut   Patching: for exercises to promote eye ROM and scanning, switch eyes for activities  Strategies: visual anchors for therapy tasks; examples: use blue tape to brayden R side of doorways and workspaces as needed, highlight " "R side of text on a page, use a reading guide; pt will need manual cues at head during exercises to separate head and eye movements.   Positioning: when possible, perform exercises in standing or pregait/walking    Exercises:  Eye stretches: hold cardinal points of gaze (up, down, side, side) for 5 seconds, 10 reps or reps to form fatigue. Will likely need tactile cues at head to separate eye from head movement.   Pursuits: Follow a slow moving target- right, left, up & down- KEEP HEAD STILL. Can also practice moving head side to side and up/down while focusing on a target. Use a target that is interesting and large enough to see clearly.   Scanning: can match tiles, match photos, search shelves, drawers, or match pictures. Start simple (2-4 targets) and gradually make this more complex by adding more items to search area or making the area more disorganized (grid vs a \"cloud\" of items\")-FOCUS ON STRATEGIES, move all the way to the right anchor then work your way over to left.   Saccades: look from one object to another, keeping head still. Start with large simple targets and make this more complex as you go. Examples: cards on the wall, Choe chart, BITS. -KEEP HEAD STILL.        DME    PT DME Recommendations  Assistive Device:  (none anticipated)      Passport items to be completed:  Get in/out of bed safely, in/out of a vehicle, safely use mobility device, walk or wheel around home/community, navigate up and down stairs, show how to get up/down from the ground, ensure home is accessible, demonstrate HEP, complete caregiver training    Physical Therapy Problems (Active)       Problem: PT-Long Term Goals       Dates: Start:  03/22/25         Goal: LTG-By discharge, patient will improve score on Ding balance assessment to >/= 41/56 to indicate low fall risk       Dates: Start:  03/22/25            Goal: LTG-By discharge, patient will ambulate 500ft SPV no device       Dates: Start:  03/22/25            Goal: LTG-By " discharge, patient will transfer one surface to another independently       Dates: Start:  03/22/25            Goal: LTG-By discharge, patient will participate in community outing with minimal verbal cueing to scan right in novel environment and no physical assistance required for mobility       Dates: Start:  03/22/25

## 2025-03-28 NOTE — THERAPY
Speech Language Pathology  Daily Treatment     Patient Name:  Lico Caba  Age:  74 y.o., Sex:  male  Medical Record #:  [id number]  Today's Date: 3/28/2025    Precautions  Precautions: Fall Risk, Cognition/Communication  Fall Risk: Poor balance, Low vision, Visual spatial neglect/Neglect program  Cognition/Communication: Expressive Aphasia  Swallow: Therapeutic dining  Comments: R visual field cut    Subjective: Pt received from PT upon arrival back to [facility] from off site session.      Objective:    03/28/25 1331   Therapy Missed   Reason For Missed Therapy   (pt completing PT off site.)   SLP Charge Group   SLP Treatment - Individual Speech Language Treatment - Individual   SLP Total Time Spent   SLP Individual Total Time Spent (Mins) 45   Interdisciplinary Plan of Care Collaboration   IDT Collaboration with  Family / Caregiver;Physical Therapist   Patient Position at End of Therapy Seated;Chair Alarm On;Call Light within Reach;Family / Friend in Room;Tray Table within Reach   Collaboration Comments Wife and granddaughter presetn throughotu session.         Receptive Language/Auditory Comprehension  Purpose: to improve comprehension of spoken/written language and to improve overall ability to communicate  Interventions:   ID numbers in Vfo3-6    Written Language Expression  Purpose: to improve the ability to express information through written communication and to develop strategies to overcome word finding and spelling difficulties  Interventions:   Copying  Barriers to Written Language Expression: Spelling accuracy 90% spelling given letters in word presented out of order.     Assessment:     - Pt with 75% given max A. Pt presented with the most difficulty in increased visual field (6). In times of increased visual field, benefited from increased cues of asking if each number was the target for Y/N responses.     -pt benefits from written model of word to copy given word letter tiles. Pt observed to  required increased processing time, but was observed to self correct errors made.     Strengths: Able to follow instructions, Pleasant and cooperative, Supportive family, Willingly participates in therapeutic activities, Motivated for self care and independence  Barriers: Aphasia expressive, Aphasia receptive, Impaired functional cognition, Visual impairment    Plan:  Continue working on picture naming, number recognition, and spelling.     Passport items to be completed:  Express basic needs, understand food/liquid recommendations, consistently follow swallow precautions, manage finances, manage medications, arrive to therapy appointments on time, complete daily memory log entries, solve problems related to safety situations, review education related to hospitalization, complete caregiver training     Speech Therapy Problems (Active)       Problem: Expression STGs       Dates: Start:  03/22/25         Goal: STG-Within one week, patient will       Dates: Start:  03/22/25       Description: 1) Individualized goal:  describe selected vocabulary by semantic features with 80% acc and MIN A.   2) Interventions:  SLP Speech Language Treatment, SLP Self Care / ADL Training , and SLP Group Treatment          Goal Note filed on 03/25/25 0808 by Alyse Anderson MS,CCC-SLP       Patient names pictures with 56% acc increasing to 80% with sentence completion cue.  Patient needs mod and occasionally max cues to implement use of semantic features to improve word finding.                 Problem: Memory STGs       Dates: Start:  03/22/25         Goal: STG-Within one week, patient will       Dates: Start:  03/22/25       Description: 1) Individualized goal:  complete working memory tasks w/ 80% accuracy and MIN A.   2) Interventions:  SLP Self Care / ADL Training , SLP Cognitive Skill Development, and SLP Group Treatment          Goal Note filed on 03/24/25 1512 by Alyse Anderson MS,CCC-SLP       To be addressed.                   Problem: Problem Solving STGs       Dates: Start:  03/22/25         Goal: STG-Within one week, patient will       Dates: Start:  03/22/25       Description: 1) Individualized goal:  complete right scanning/neglect tasks w/ MIN A and 80% acc.   2) Interventions:  SLP Self Care / ADL Training , SLP Cognitive Skill Development, and SLP Group Treatment          Goal Note filed on 03/24/25 1512 by Alyse Anderson MS,CCC-SLP       Patient needs min to mod cues for 80% acc in environ mental scans related to eating.  Continue goal.                 Problem: Social Interaction STGs       Dates: Start:  03/22/25         Goal: STG-Within one week, patient will       Dates: Start:  03/22/25               Problem: Speech/Swallowing LTGs       Dates: Start:  03/22/25         Goal: LTG-By discharge, patient will safely swallow       Dates: Start:  03/22/25       Description: 1) Individualized goal:  regular textures and thin liquids implementing safe swallow strategies in >90% of opportunities with MOD I for a safe D/C to PLOF.   2) Interventions:  SLP Swallowing Dysfunction Treatment, SLP Self Care / ADL Training , and SLP Group Treatment             Goal: LTG-By discharge, patient will solve basic problems       Dates: Start:  03/22/25       Description: 1) Individualized goal:  w/ 80% acc and MOD I for a safe D/C to PLOF.   2) Interventions:  SLP Speech Language Treatment, SLP Self Care / ADL Training , SLP Cognitive Skill Development, and SLP Group Treatment                Problem: Swallowing STGs       Dates: Start:  03/22/25         Goal: STG-Within one week, patient will       Dates: Start:  03/22/25

## 2025-03-28 NOTE — CARE PLAN
The patient is Watcher - Medium risk of patient condition declining or worsening    Shift Goals  Clinical Goals: Safety  Patient Goals: Rest    Problem: Respiratory  Goal: Patient will understand use and administration of respiratory medications to improve respiratory function  Outcome: Progressing    Patient's oxygen saturation is WNL on RA.       Problem: Skin Integrity  Goal: Patient's skin integrity will be maintained or improve  Outcome: Progressing    Patient's skin is CDI.

## 2025-03-28 NOTE — PROGRESS NOTES
NURSING DAILY NOTE    Name: Lico Caba   Date of Admission: 3/21/2025   Admitting Diagnosis: Intracranial hemorrhage, spontaneous intraparenchymal, idiopathic, acute ([facility])  Attending Physician: SAVITA GOODMAN M.D.  Allergies: Patient has no known allergies.    Safety  Patient Assist  CGA  Patient Precautions  Precautions: Fall Risk, Cognition/Communication  Fall Risk: Poor balance, Low vision, Visual spatial neglect/Neglect program  Cognition/Communication: Expressive Aphasia  Swallow: Therapeutic dining  Comments: R visual field cut  Bed Transfer Status  Supervised  Toilet Transfer Status   Stand By Assist  Assistive Devices  Rails, Wheelchair  Oxygen  None - Room Air  Diet/Therapeutic Dining  Current Diet Order   Procedures    Diet Order Diet: Regular (T-dine, meats cut up)     Pill Administration  whole  Agitated Behavioral Scale  17  ABS Level of Severity  No Agitation    Fall Risk  Has the patient had a fall this admission?      Genesis Elizondo Fall Risk Scoring  21, HIGH RISK  Fall Risk Safety Measures  bed alarm, chair alarm, seatbelt alarm, fall during this hospitalization, and poor balance    Vitals  Temperature: 37.1 °C (98.7 °F)  Temp src: Temporal  Pulse: (!) 59  Respiration: 18  Blood Pressure : 120/70  Blood Pressure MAP (Calculated): 87 MM HG  BP Location: Right, Upper Arm  Patient BP Position: Davis's Position     Oxygen  Pulse Oximetry: 97 %  O2 (LPM): 0  O2 Delivery Device: None - Room Air    Bowel and Bladder  Last Bowel Movement  03/27/25  Stool Type  Type 6: Fluffy pieces with ragged edges, a mushy stool  Bowel Device  Bathroom  Continent  Bladder: Did not void   Bowel: No movement  Bladder Function  Urine Void (mL): 500 ml  Number of Times Voided: 2  Urine Color: Susie  Genitourinary Assessment   Bladder Assessment (WDL):  Within Defined Limits  Urine Color: Susie  Bladder Device: Bathroom, Diaper, Urinal  Bladder Scan: Post Void  $ Bladder Scan Results (mL): 34    Skin  Dereje  Score   19  Sensory Interventions   Bed Types: Standard/Trauma Mattress  Skin Preventative Measures: Pillows in Use for Support / Positioning  Moisture Interventions         Pain  Pain Rating Scale  0 - No Pain  Pain Location  Head  Pain Location Orientation  Inner  Pain Interventions   Declines    ADLs    Bathing   Shower, Staff, Family / Significant Other  Linen Change   Partial  Personal Hygiene     Chlorhexidine Bath      Oral Care     Teeth/Dentures     Shave     Nutrition Percentage Eaten  Dinner, Between % Consumed  Environmental Precautions  Treaded Slipper Socks on Patient, Personal Belongings, Wastebasket, Call Bell etc. in Easy Reach, Bed in Low Position  Patient Turns/Positioning  Patient turns self independently side to side without assistance, to offload sacral area  Patient Turns Assistance/Tolerance  Standby Assist  Bed Positions  Bed Controls On, Bed Locked  Head of Bed Elevated  Self regulated      Psychosocial/Neurologic Assessment  Psychosocial Assessment  Psychosocial (WDL):  WDL Except  Patient Behaviors: Forgetful  Family Behaviors: No Family Present  Neurologic Assessment  Neuro (WDL): Exceptions to WDL  Level of Consciousness: Alert  Orientation Level: Disoriented to time  Cognition: Follows commands, Poor attention/concentration, Appropriate safety awareness  Speech: Clear  Pupil Assesment: No  Motor Function/Sensation Assessment: Motor strength  Muscle Strength Right Arm: Good Strength Against Gravity and Moderate Resistance  Muscle Strength Left Arm: Good Strength Against Gravity and Moderate Resistance  Muscle Strength Right Leg: Good Strength Against Gravity and Moderate Resistance  Muscle Strength Left Leg: Good Strength Against Gravity and Moderate Resistance  EENT (WDL):  WDL Except    Cardio/Pulmonary Assessment  Edema      Respiratory Breath Sounds  RUL Breath Sounds: Clear  RML Breath Sounds: Clear  RLL Breath Sounds: Diminished  ZACH Breath Sounds: Clear  LLL Breath Sounds:  Diminished  Cardiac Assessment   Cardiac (WDL):  Within Defined Limits

## 2025-03-28 NOTE — CARE PLAN
"The patient is Watcher - Medium risk of patient condition declining or worsening    Shift Goals  Clinical Goals: Comfort and safety  Patient Goals: Comfort and Safety    Progress made toward(s) clinical / shift goals:    Problem: Knowledge Deficit - Standard  Goal: Patient and family/care givers will demonstrate understanding of plan of care, disease process/condition, diagnostic tests and medications  Outcome: Progressing     Problem: Fall Risk - Rehab  Goal: Patient will remain free from falls  Outcome: Progressing  Note: Genesis Elizondo Fall risk Assessment Score: 21    High fall risk Interventions   - Bed and strip alarm   - Yellow sign by the door   - Yellow wrist band \"Fall risk\"  - Room near to the nurse station  - Do not leave patient unattended in the bathroom  - Fall risk education provided       Problem: Hemodynamics  Goal: Patient's hemodynamics, fluid balance and neurologic status will be stable or improve  Outcome: Progressing     Problem: Bladder / Voiding  Goal: Patient will establish and maintain regular urinary output  Outcome: Progressing  Note: Patient is continent of urine. Uses urinal at night.      Problem: Infection  Goal: Patient will remain free from infection  Outcome: Progressing  Note: Patient remains free from s/s infection; afebrile.  Will continue to monitor.       Patient is not progressing towards the following goals:      "

## 2025-03-28 NOTE — THERAPY
Physical Therapy   Daily Treatment     Patient Name:  Lico Caba  Age:  74 y.o., Sex:  male  Medical Record #:  [id number]  Today's Date: 3/28/2025     Precautions  Precautions: Fall Risk, Cognition/Communication  Fall Risk: Poor balance, Low vision, Visual spatial neglect/Neglect program  Cognition/Communication: Expressive Aphasia  Swallow: Therapeutic dining  Comments: R visual field cut    Subjective: Pt eager to participate in community outing. Agreeable to goals    Objective:    03/28/25 1230   PT Charge Group   PT Therapeutic Activities (Units) 5   PT Total Time Spent   PT Individual Total Time Spent (Mins) 75   Interdisciplinary Plan of Care Collaboration   IDT Collaboration with  Family / Caregiver   Patient Position at End of Therapy Seated;Edge of Bed;Family / Friend in Room   Collaboration Comments spouse present for community outing       [facility] COMMUNITY OUTING      Goals for Community Outing:    Physical Therapy: In the community, the patient will be able to navigate all surfaces safely with supervision and no AD, navigate the community environment safely using a wheelchair or ambulatory mode of mobility with supervision, and attend to environmental stimuli with minimal cueing for attention to the right. Additional goal: avoid collisions with obstacles and others with minimal cueing      Outing Information:    Prior to transportation for the community outing, the primary nurse was notified of outing logistics and the off unit privileges order.      The patient was transported ambulation without assisted device.  During transport, patient was educated on the following topics: safety with mobility, accessibility, and cognitive strategies    [facility]  Surfaces Traversed: Gravel, Dirt, Sidewalks, Curbs, Stairs, and Ramp  Mode of Mobility Utilized:  no AD, SBA-CGA using gait belt    Community Reintegration Activities  Completed:      Cognitive Tasks Comments   Money Management N/A   Navigation Maximal assist due to novel environment   Problem Solving N/A   Attention Moderate cueing to attend to task and to path   Self Monitoring N/A   Safety Moderate cueing for safety at intersections and to avoid collisions         Mobility Tasks Comments   Stairs/Curbs/Ramps CGA with L HR 25steps   Escalator N/A   Elevator N/A   Transfers completed SPV to tall bar seat   Balance SPV   Ambulation SBA-CGA due to tendency for collisions on the right   Wheelchair Mobility N/A       Educated pt and spouse on safe mobility, scanning right, and fall prevention during community outing      Assessment: Shannan required moderate cueing to attend to the right to avoid obstacles in busy novel environments. When allowing pt to gently collide with obstacles (ex: leaves from bushes/trees) he did not acknowledge or respond appropriately. Pt needed to be told to stop ambulating on two occasions to avoid potentially dangerous collisions as well as to reinforce safety at traffic intersections.     Strengths: Adequate strength, Independent prior level of function, Making steady progress towards goals, Motivated for self care and independence, Pleasant and cooperative, Supportive family, Willingly participates in therapeutic activities  Barriers: Aphasia expressive, Aphasia receptive, Hearing impairment, Impaired balance, Impaired insight/denial of deficits, Impaired functional cognition, Visual impairment    Plan:   FGA, community outing, floor transfer   R sided visual scanning/compensatory strategies     DME    PT DME Recommendations  Assistive Device:  (none anticipated)      Passport items to be completed:  Get in/out of bed safely, in/out of a vehicle, safely use mobility device, walk or wheel around home/community, navigate up and down stairs, show how to get up/down from the ground, ensure home is accessible, demonstrate HEP, complete caregiver  training    Physical Therapy Problems (Active)       Problem: PT-Long Term Goals       Dates: Start:  03/22/25         Goal: LTG-By discharge, patient will improve score on Ding balance assessment to >/= 41/56 to indicate low fall risk       Dates: Start:  03/22/25            Goal: LTG-By discharge, patient will ambulate 500ft SPV no device       Dates: Start:  03/22/25            Goal: LTG-By discharge, patient will transfer one surface to another independently       Dates: Start:  03/22/25            Goal: LTG-By discharge, patient will participate in community outing with minimal verbal cueing to scan right in novel environment and no physical assistance required for mobility       Dates: Start:  03/22/25

## 2025-03-28 NOTE — PROGRESS NOTES
NURSING DAILY NOTE    Name: Lico Caba   Date of Admission: 3/21/2025   Admitting Diagnosis: Intracranial hemorrhage, spontaneous intraparenchymal, idiopathic, acute ([facility])  Attending Physician: SAVITA GOODMAN M.D.  Allergies: Patient has no known allergies.    Safety  Patient Assist  cga  Patient Precautions  Precautions: Fall Risk, Cognition/Communication  Fall Risk: Poor balance, Low vision, Visual spatial neglect/Neglect program  Cognition/Communication: Expressive Aphasia  Swallow: Therapeutic dining  Comments: R visual field cut  Bed Transfer Status  Supervised  Toilet Transfer Status   Stand By Assist  Assistive Devices  Rails, Wheelchair  Oxygen  None - Room Air  Diet/Therapeutic Dining  Current Diet Order   Procedures    Diet Order Diet: Regular (T-dine, meats cut up)     Pill Administration  whole  Agitated Behavioral Scale  17  ABS Level of Severity  No Agitation    Fall Risk  Has the patient had a fall this admission?      Genesis Elizondo Fall Risk Scoring  21, HIGH RISK  Fall Risk Safety Measures  bed alarm, chair alarm, seatbelt alarm, poor balance, and low vision/hearing    Vitals  Temperature: 36.6 °C (97.9 °F)  Temp src: Oral  Pulse: 74  Respiration: 18  Blood Pressure : 112/71  Blood Pressure MAP (Calculated): 85 MM HG  BP Location: Right, Upper Arm  Patient BP Position: Sitting     Oxygen  Pulse Oximetry: 96 %  O2 (LPM): 0  O2 Delivery Device: None - Room Air    Bowel and Bladder  Last Bowel Movement  03/27/25  Stool Type     Bowel Device  Bathroom  Continent  Bladder: Did not void   Bowel: No movement  Bladder Function  Urine Void (mL): 300 ml  Number of Times Voided: 1  Urine Color: Yellow  Genitourinary Assessment   Bladder Assessment (WDL):  Within Defined Limits  Urine Color: Yellow  Bladder Device: Bathroom, Urinal  Bladder Scan: Post Void  $ Bladder Scan Results (mL): 34    Skin  Dereje Score   19  Sensory Interventions   Bed Types: Standard/Trauma Mattress  Skin Preventative  Measures: Pillows in Use for Support / Positioning  Moisture Interventions         Pain  Pain Rating Scale  0 - No Pain  Pain Location  Head  Pain Location Orientation  Inner  Pain Interventions   Declines    ADLs    Bathing   Shower, Staff, Family / Significant Other  Linen Change   Partial  Personal Hygiene     Chlorhexidine Bath      Oral Care     Teeth/Dentures     Shave     Nutrition Percentage Eaten  Dinner, Between % Consumed  Environmental Precautions  Treaded Slipper Socks on Patient, Personal Belongings, Wastebasket, Call Bell etc. in Easy Reach, Bed in Low Position  Patient Turns/Positioning  Sitting up in wheelchair  Patient Turns Assistance/Tolerance  Standby Assist  Bed Positions  Bed Controls On, Bed Locked  Head of Bed Elevated  Self regulated      Psychosocial/Neurologic Assessment  Psychosocial Assessment  Psychosocial (WDL):  WDL Except  Patient Behaviors: Forgetful  Family Behaviors: No Family Present  Neurologic Assessment  Neuro (WDL): Exceptions to WDL  Level of Consciousness: Alert  Orientation Level: Disoriented to time  Cognition: Follows commands, Poor attention/concentration, Appropriate safety awareness  Speech: Clear  Pupil Assesment: No  Motor Function/Sensation Assessment: Motor strength  Muscle Strength Right Arm: Good Strength Against Gravity and Moderate Resistance  Muscle Strength Left Arm: Good Strength Against Gravity and Moderate Resistance  Muscle Strength Right Leg: Good Strength Against Gravity and Moderate Resistance  Muscle Strength Left Leg: Good Strength Against Gravity and Moderate Resistance  EENT (WDL):  WDL Except    Cardio/Pulmonary Assessment  Edema      Respiratory Breath Sounds  RUL Breath Sounds: Clear  RML Breath Sounds: Clear  RLL Breath Sounds: Diminished  ZACH Breath Sounds: Clear  LLL Breath Sounds: Diminished  Cardiac Assessment   Cardiac (WDL):  Within Defined Limits

## 2025-03-29 ENCOUNTER — APPOINTMENT (OUTPATIENT)
Dept: PHYSICAL THERAPY | Facility: REHABILITATION | Age: 75
DRG: 057 | End: 2025-03-29
Attending: PHYSICAL MEDICINE & REHABILITATION
Payer: COMMERCIAL

## 2025-03-29 PROCEDURE — 700102 HCHG RX REV CODE 250 W/ 637 OVERRIDE(OP): Performed by: STUDENT IN AN ORGANIZED HEALTH CARE EDUCATION/TRAINING PROGRAM

## 2025-03-29 PROCEDURE — A9270 NON-COVERED ITEM OR SERVICE: HCPCS | Performed by: STUDENT IN AN ORGANIZED HEALTH CARE EDUCATION/TRAINING PROGRAM

## 2025-03-29 PROCEDURE — 700102 HCHG RX REV CODE 250 W/ 637 OVERRIDE(OP): Performed by: PHYSICAL MEDICINE & REHABILITATION

## 2025-03-29 PROCEDURE — 770010 HCHG ROOM/CARE - REHAB SEMI PRIVAT*

## 2025-03-29 PROCEDURE — 97150 GROUP THERAPEUTIC PROCEDURES: CPT

## 2025-03-29 PROCEDURE — A9270 NON-COVERED ITEM OR SERVICE: HCPCS | Performed by: PHYSICAL MEDICINE & REHABILITATION

## 2025-03-29 RX ADMIN — SENNOSIDES AND DOCUSATE SODIUM 2 TABLET: 50; 8.6 TABLET ORAL at 21:27

## 2025-03-29 RX ADMIN — LEVETIRACETAM 500 MG: 500 TABLET, FILM COATED ORAL at 07:47

## 2025-03-29 RX ADMIN — ACETAMINOPHEN 1000 MG: 500 TABLET ORAL at 16:55

## 2025-03-29 RX ADMIN — LEVETIRACETAM 500 MG: 500 TABLET, FILM COATED ORAL at 21:27

## 2025-03-29 RX ADMIN — ACETAMINOPHEN 1000 MG: 500 TABLET ORAL at 07:47

## 2025-03-29 ASSESSMENT — PATIENT HEALTH QUESTIONNAIRE - PHQ9
1. LITTLE INTEREST OR PLEASURE IN DOING THINGS: NOT AT ALL
2. FEELING DOWN, DEPRESSED, IRRITABLE, OR HOPELESS: NOT AT ALL
SUM OF ALL RESPONSES TO PHQ9 QUESTIONS 1 AND 2: 0

## 2025-03-29 ASSESSMENT — PAIN DESCRIPTION - PAIN TYPE
TYPE: ACUTE PAIN
TYPE: ACUTE PAIN

## 2025-03-29 NOTE — PROGRESS NOTES
NURSING DAILY NOTE    Name: Lico Caba   Date of Admission: 3/21/2025   Admitting Diagnosis: Intracranial hemorrhage, spontaneous intraparenchymal, idiopathic, acute ([facility])  Attending Physician: CHRISTEL KILGORE D.O.  Allergies: Patient has no known allergies.    Safety  Patient Assist  family cleared  Patient Precautions  Precautions: Fall Risk, Cognition/Communication  Fall Risk: Poor balance, Poor safety, Low vision, Visual spatial neglect/Neglect program, Alarming seat belt  Cognition/Communication: Expressive Aphasia  Swallow: Therapeutic dining  Comments: R visual field cut  Bed Transfer Status  Supervised  Toilet Transfer Status   Stand By Assist  Assistive Devices  Rails, Wheelchair  Oxygen  None - Room Air  Diet/Therapeutic Dining  Current Diet Order   Procedures    Diet Order Diet: Regular (T-dine, meats cut up)     Pill Administration  whole  Agitated Behavioral Scale  17  ABS Level of Severity  No Agitation    Fall Risk  Has the patient had a fall this admission?      Genesis Elizondo Fall Risk Scoring  15, HIGH RISK  Fall Risk Safety Measures  bed alarm, chair alarm, seatbelt alarm, and poor balance    Vitals  Temperature: 36.6 °C (97.9 °F)  Temp src: Temporal  Pulse: (!) 58  Respiration: 18  Blood Pressure : 127/70  Blood Pressure MAP (Calculated): 89 MM HG  BP Location: Right, Upper Arm  Patient BP Position: Sitting     Oxygen  Pulse Oximetry: 95 %  O2 (LPM): 0  O2 Delivery Device: None - Room Air    Bowel and Bladder  Last Bowel Movement  03/27/25  Stool Type  Type 6: Fluffy pieces with ragged edges, a mushy stool  Bowel Device  Bathroom  Continent  Bladder: Did not void   Bowel: No movement  Bladder Function  Urine Void (mL): 500 ml  Number of Times Voided: 1  Urine Color: Unable To Evaluate  Number of Times Incontinent of Urine: 0  Genitourinary Assessment   Bladder Assessment (WDL):  Within Defined Limits  Urine Color: Unable To Evaluate  Number of Bladder Accidents: 0  Total Number of  Bladder of Accidents in Last 7 Days: 0  Number of Times Incontinent of Urine: 0  Bladder Device: Bathroom  Time Void: Yes  Bladder Scan: Post Void  $ Bladder Scan Results (mL): 34    Skin  Dereje Score   19  Sensory Interventions   Bed Types: Standard/Trauma Mattress  Skin Preventative Measures: Pillows in Use to Float Heels  Moisture Interventions         Pain  Pain Rating Scale  4 - Distracts me, can do usual activities  Pain Location  Head  Pain Location Orientation  Inner  Pain Interventions   Medication (see MAR)    ADLs    Bathing   Shower, * * With Assistance from, Family / Significant Other  Linen Change   Partial  Personal Hygiene     Chlorhexidine Bath      Oral Care  Brushed Teeth  Teeth/Dentures     Shave  Full Assist (spouse)  Nutrition Percentage Eaten  *  * Meal *  *, Lunch, Between 25-50% Consumed  Environmental Precautions  Treaded Slipper Socks on Patient, Personal Belongings, Wastebasket, Call Bell etc. in Easy Reach, Bed in Low Position  Patient Turns/Positioning  Patient turns self independently side to side without assistance, to offload sacral area  Patient Turns Assistance/Tolerance  Standby Assist  Bed Positions  Bed Controls On, Bed Locked  Head of Bed Elevated  Self regulated      Psychosocial/Neurologic Assessment  Psychosocial Assessment  Psychosocial (WDL):  WDL Except  Patient Behaviors: Forgetful  Family Behaviors: No Family Present  Neurologic Assessment  Neuro (WDL): Exceptions to WDL  Level of Consciousness: Alert  Orientation Level: Disoriented to time  Cognition: Follows commands, Poor attention/concentration, Appropriate safety awareness  Speech: Clear  Pupil Assesment: No  Motor Function/Sensation Assessment: Motor strength  Muscle Strength Right Arm: Good Strength Against Gravity and Moderate Resistance  Muscle Strength Left Arm: Good Strength Against Gravity and Moderate Resistance  Muscle Strength Right Leg: Good Strength Against Gravity and Moderate Resistance  Muscle  Strength Left Leg: Good Strength Against Gravity and Moderate Resistance  EENT (WDL):  WDL Except    Cardio/Pulmonary Assessment  Edema      Respiratory Breath Sounds  RUL Breath Sounds: Clear  RML Breath Sounds: Clear  RLL Breath Sounds: Diminished  ZACH Breath Sounds: Clear  LLL Breath Sounds: Diminished  Cardiac Assessment   Cardiac (WDL):  Within Defined Limits

## 2025-03-29 NOTE — PROGRESS NOTES
NURSING DAILY NOTE    Name: Lico Caba   Date of Admission: 3/21/2025   Admitting Diagnosis: Intracranial hemorrhage, spontaneous intraparenchymal, idiopathic, acute ([facility])  Attending Physician: CHRISTEL KILGORE D.O.  Allergies: Patient has no known allergies.    Safety  Patient Assist  CGA  Patient Precautions  Precautions: Fall Risk, Cognition/Communication  Fall Risk: Poor balance, Low vision, Visual spatial neglect/Neglect program  Cognition/Communication: Expressive Aphasia  Swallow: Therapeutic dining  Comments: R visual field cut  Bed Transfer Status  Supervised  Toilet Transfer Status   Stand By Assist  Assistive Devices  Rails, Wheelchair  Oxygen  None - Room Air  Diet/Therapeutic Dining  Current Diet Order   Procedures    Diet Order Diet: Regular (T-dine, meats cut up)     Pill Administration  whole  Agitated Behavioral Scale  17  ABS Level of Severity  No Agitation    Fall Risk  Has the patient had a fall this admission?      Genesis Elizondo Fall Risk Scoring  21, HIGH RISK  Fall Risk Safety Measures  bed alarm, chair alarm, and seatbelt alarm    Vitals  Temperature: 36.6 °C (97.9 °F)  Temp src: Temporal  Pulse: 70  Respiration: 17  Blood Pressure : 128/67  Blood Pressure MAP (Calculated): 87 MM HG  BP Location: Right, Upper Arm  Patient BP Position: Davis's Position     Oxygen  Pulse Oximetry: 95 %  O2 (LPM): 0  O2 Delivery Device: None - Room Air    Bowel and Bladder  Last Bowel Movement  03/27/25  Stool Type  Type 6: Fluffy pieces with ragged edges, a mushy stool  Bowel Device  Bathroom  Continent  Bladder: Did not void   Bowel: No movement  Bladder Function  Urine Void (mL): 500 ml  Number of Times Voided: 2  Urine Color: Susie  Genitourinary Assessment   Bladder Assessment (WDL):  Within Defined Limits  Urine Color: Susie  Bladder Device: Bathroom, Diaper, Urinal  Bladder Scan: Post Void  $ Bladder Scan Results (mL): 34    Skin  Dereje Score   19  Sensory Interventions   Bed Types:  Standard/Trauma Mattress  Skin Preventative Measures: Pillows in Use for Support / Positioning  Moisture Interventions         Pain  Pain Rating Scale  0 - No Pain  Pain Location  Head  Pain Location Orientation  Inner  Pain Interventions   Declines    ADLs    Bathing   Shower, Staff, Family / Significant Other  Linen Change   Partial  Personal Hygiene     Chlorhexidine Bath      Oral Care     Teeth/Dentures     Shave     Nutrition Percentage Eaten  Breakfast, Between % Consumed (100%)  Environmental Precautions  Treaded Slipper Socks on Patient, Personal Belongings, Wastebasket, Call Bell etc. in Easy Reach, Bed in Low Position  Patient Turns/Positioning  Patient turns self independently side to side without assistance, to offload sacral area  Patient Turns Assistance/Tolerance  Standby Assist  Bed Positions  Bed Controls On, Bed Locked  Head of Bed Elevated  Self regulated      Psychosocial/Neurologic Assessment  Psychosocial Assessment  Psychosocial (WDL):  WDL Except  Patient Behaviors: Forgetful  Family Behaviors: Family Present  Neurologic Assessment  Neuro (WDL): Exceptions to WDL  Level of Consciousness: Alert  Orientation Level: Disoriented to time  Cognition: Follows commands, Poor attention/concentration, Appropriate safety awareness  Speech: Clear  Pupil Assesment: No  Motor Function/Sensation Assessment: Motor strength  Muscle Strength Right Arm: Good Strength Against Gravity and Moderate Resistance  Muscle Strength Left Arm: Good Strength Against Gravity and Moderate Resistance  Muscle Strength Right Leg: Good Strength Against Gravity and Moderate Resistance  Muscle Strength Left Leg: Good Strength Against Gravity and Moderate Resistance  EENT (WDL):  WDL Except    Cardio/Pulmonary Assessment  Edema      Respiratory Breath Sounds  RUL Breath Sounds: Clear  RML Breath Sounds: Clear  RLL Breath Sounds: Diminished  ZACH Breath Sounds: Clear  LLL Breath Sounds: Diminished  Cardiac Assessment   Cardiac  (WDL):  Within Defined Limits

## 2025-03-29 NOTE — PROGRESS NOTES
NURSING DAILY NOTE    Name: Lico Caba   Date of Admission: 3/21/2025   Admitting Diagnosis: Intracranial hemorrhage, spontaneous intraparenchymal, idiopathic, acute ([facility])  Attending Physician: CHRISTEL KILGORE D.O.  Allergies: Patient has no known allergies.    Safety  Patient Assist  cg  Patient Precautions  Precautions: Fall Risk, Cognition/Communication  Fall Risk: Poor balance, Poor safety, Low vision, Visual spatial neglect/Neglect program, Alarming seat belt  Cognition/Communication: Expressive Aphasia  Swallow: Therapeutic dining  Comments: R visual field cut  Bed Transfer Status  Supervised  Toilet Transfer Status   Stand By Assist  Assistive Devices  Rails, Wheelchair  Oxygen  None - Room Air  Diet/Therapeutic Dining  Current Diet Order   Procedures    Diet Order Diet: Regular (T-dine, meats cut up)     Pill Administration  whole  Agitated Behavioral Scale  17  ABS Level of Severity  No Agitation    Fall Risk  Has the patient had a fall this admission?      Genesis Elizondo Fall Risk Scoring  14, MODERATE RISK  Fall Risk Safety Measures  bed alarm, chair alarm, seatbelt alarm, poor balance, and low vision/hearing    Vitals  Temperature: 36.3 °C (97.3 °F)  Temp src: Temporal  Pulse: (!) 59  Respiration: 18  Blood Pressure : 115/66  Blood Pressure MAP (Calculated): 82 MM HG  BP Location: Left, Upper Arm  Patient BP Position: Lying Right Side     Oxygen  Pulse Oximetry: 95 %  O2 (LPM): 0  O2 Delivery Device: None - Room Air    Bowel and Bladder  Last Bowel Movement  03/27/25  Stool Type  Type 6: Fluffy pieces with ragged edges, a mushy stool  Bowel Device  Bathroom  Continent  Bladder: Did not void   Bowel: No movement  Bladder Function  Urine Void (mL): 500 ml  Number of Times Voided: 1  Urine Color: Yellow  Number of Times Incontinent of Urine: 0  Genitourinary Assessment   Bladder Assessment (WDL):  Within Defined Limits  Urine Color: Yellow  Number of Bladder Accidents: 0  Total Number of  Bladder of Accidents in Last 7 Days: 0  Number of Times Incontinent of Urine: 0  Bladder Device: Bathroom  Time Void: Yes  Bladder Scan: Post Void  $ Bladder Scan Results (mL): 34    Skin  Dereje Score   19  Sensory Interventions   Bed Types: Standard/Trauma Mattress  Skin Preventative Measures: Pillows in Use to Float Heels, Pillows in Use for Support / Positioning  Moisture Interventions         Pain  Pain Rating Scale  0 - No Pain  Pain Location  Head  Pain Location Orientation  Inner  Pain Interventions   Declines    ADLs    Bathing   Shower, Staff, Family / Significant Other  Linen Change   Partial  Personal Hygiene     Chlorhexidine Bath      Oral Care     Teeth/Dentures     Shave     Nutrition Percentage Eaten  *  * Meal *  *, Dinner, Between % Consumed  Environmental Precautions  Treaded Slipper Socks on Patient, Personal Belongings, Wastebasket, Call Bell etc. in Easy Reach, Bed in Low Position  Patient Turns/Positioning  Patient turns self independently side to side without assistance, to offload sacral area  Patient Turns Assistance/Tolerance  Standby Assist  Bed Positions  Bed Controls On, Bed Locked  Head of Bed Elevated  Self regulated      Psychosocial/Neurologic Assessment  Psychosocial Assessment  Psychosocial (WDL):  WDL Except  Patient Behaviors: Forgetful  Family Behaviors: No Family Present  Neurologic Assessment  Neuro (WDL): Exceptions to WDL  Level of Consciousness: Alert  Orientation Level: Disoriented to time  Cognition: Follows commands, Poor attention/concentration, Appropriate safety awareness  Speech: Clear  Pupil Assesment: No  Motor Function/Sensation Assessment: Motor strength  Muscle Strength Right Arm: Good Strength Against Gravity and Moderate Resistance  Muscle Strength Left Arm: Good Strength Against Gravity and Moderate Resistance  Muscle Strength Right Leg: Good Strength Against Gravity and Moderate Resistance  Muscle Strength Left Leg: Good Strength Against Gravity and  Moderate Resistance  EENT (WDL):  WDL Except    Cardio/Pulmonary Assessment  Edema      Respiratory Breath Sounds  RUL Breath Sounds: Clear  RML Breath Sounds: Clear  RLL Breath Sounds: Diminished  ZACH Breath Sounds: Clear  LLL Breath Sounds: Diminished  Cardiac Assessment   Cardiac (WDL):  Within Defined Limits

## 2025-03-29 NOTE — CARE PLAN
The patient is Stable - Low risk of patient condition declining or worsening    Shift Goals  Clinical Goals: Pain control, safety  Patient Goals: Safety  Family Goals: no family present    Progress made toward(s) clinical / shift goals:    Problem: Hemodynamics  Goal: Patient's hemodynamics, fluid balance and neurologic status will be stable or improve  Outcome: Progressing  Note:    Latest Reference Range & Units 03/28/25 06:18   WBC 4.8 - 10.8 K/uL 10.7   RBC 4.70 - 6.10 M/uL 4.93   Hemoglobin 14.0 - 18.0 g/dL 15.0   Hematocrit 42.0 - 52.0 % 45.3   MCV 81.4 - 97.8 fL 91.9   MCH 27.0 - 33.0 pg 30.4   MCHC 32.3 - 36.5 g/dL 33.1   RDW 35.9 - 50.0 fL 44.2   Platelet Count 164 - 446 K/uL 252   MPV 9.0 - 12.9 fL 9.4   Neutrophils-Polys 44.00 - 72.00 % 61.90   Neutrophils (Absolute) 1.82 - 7.42 K/uL 6.59   Lymphocytes 22.00 - 41.00 % 24.60   Lymphs (Absolute) 1.00 - 4.80 K/uL 2.62   Monocytes 0.00 - 13.40 % 10.00   Monos (Absolute) 0.00 - 0.85 K/uL 1.07 (H)   Eosinophils 0.00 - 6.90 % 1.90   Eos (Absolute) 0.00 - 0.51 K/uL 0.20   Basophils 0.00 - 1.80 % 0.30   Baso (Absolute) 0.00 - 0.12 K/uL 0.03   Immature Granulocytes 0.00 - 0.90 % 1.30 (H)   Immature Granulocytes (abs) 0.00 - 0.11 K/uL 0.14 (H)   Nucleated RBC 0.00 - 0.20 /100 WBC 0.00   NRBC (Absolute) K/uL 0.00   Sodium 135 - 145 mmol/L 136   Potassium 3.6 - 5.5 mmol/L 4.3   Chloride 96 - 112 mmol/L 101   Co2 20 - 33 mmol/L 25   Anion Gap 7.0 - 16.0  10.0   Glucose 65 - 99 mg/dL 93   Bun 8 - 22 mg/dL 14   Creatinine 0.50 - 1.40 mg/dL 0.75   GFR (CKD-EPI) >60 mL/min/1.73 m 2 93   Calcium 8.5 - 10.5 mg/dL 8.4 (L)   Correct Calcium 8.5 - 10.5 mg/dL 8.8   AST(SGOT) 12 - 45 U/L 35   ALT(SGPT) 2 - 50 U/L 83 (H)   Alkaline Phosphatase 30 - 99 U/L 102 (H)   Total Bilirubin 0.1 - 1.5 mg/dL 0.4   Albumin 3.2 - 4.9 g/dL 3.5   Total Protein 6.0 - 8.2 g/dL 6.0   Globulin 1.9 - 3.5 g/dL 2.5   A-G Ratio g/dL 1.4   (H): Data is abnormally high  (L): Data is abnormally low        Patient is not progressing towards the following goals:

## 2025-03-29 NOTE — CARE PLAN
"The patient is Stable - Low risk of patient condition declining or worsening    Shift Goals  Clinical Goals: safety  Patient Goals: sleep well  Family Goals: no family present    Progress made toward(s) clinical / shift goals:    Problem: Knowledge Deficit - Standard  Goal: Patient and family/care givers will demonstrate understanding of plan of care, disease process/condition, diagnostic tests and medications  Outcome: Progressing  Reviewed POC, all questions answered at this time.      Problem: Fall Risk - Rehab  Goal: Patient will remain free from falls  Outcome: Progressing  Genesis Elizondo Fall risk Assessment Score: 14    Moderate fall risk Interventions  - Bed and strip alarm   - Yellow sign by the door   - Yellow wrist band \"Fall risk\"  - Room near to the nurse station  - Do not leave patient unattended in the bathroom  - Fall risk education provided    Problem: Bladder / Voiding  Goal: Patient will establish and maintain regular urinary output  Outcome: Progressing  Goal: Patient will establish and maintain bladder regimen  Outcome: Progressing     Patient voiding adequate amounts of clear, yellow urine. Denies dysuria and flank pain: afebrile. Will continue to monitor.   "

## 2025-03-29 NOTE — THERAPY
Physical Therapy   Daily Treatment     Patient Name:  Lioc Caba  Age:  74 y.o., Sex:  male  Medical Record #:  [id number]  Today's Date: 3/29/2025     Precautions  Precautions: Fall Risk, Cognition/Communication  Fall Risk: Poor balance, Poor safety, Low vision, Visual spatial neglect/Neglect program, Alarming seat belt  Cognition/Communication: Expressive Aphasia  Swallow: Therapeutic dining  Comments: R visual field cut    Subjective: Agreeable to participate in group therex    Objective:    03/29/25 1030   PT Charge Group   PT Group Therapy Group Activities   PT Total Time Spent   PT Group Total Time Spent (Mins) 60   Roll Left and Right   Level of Assist Independent   Sit to Lying   Level of Assist Independent   Additional Description Completed on regular bed   Lying to Sitting on Side of Bed   Level of Assist Independent   Additional Description Completed on regular bed   Sit to Stand   Level of Assist Supervised   Assistive Devices No AD   Chair/Bed-to-Chair Transfer   Level of Assist Supervised   Transfer Type Stand Step Transfer   Assistive Devices No AD   Wheelchair   Level of Assist Supervised   Type Manual   Additional Description Cueing needed   Wheelchair Distance 150   Interdisciplinary Plan of Care Collaboration   Patient Position at End of Therapy Seated;Chair Alarm On;Self Releasing Lap Belt Applied  (in dining room for lunch)         Group Therapy  Purpose: to validate increased independence with skills, to increase patient interaction during physical recovery, and to reinforce strengthening and movement through group format demonstration  Interventions:   LE exercise group  Mat therex- 3x10 SAQ 1.5 lb ankle weights, hookyling hip adduction ball squeeze, hookyling hip abduction blue theraband, hookyling marching, bridges, B LE flexion lower abdominal curl, and lower trunk rotations  Sit to stands from edge of mat 2x10  Seated ball kick to partner x5 ea    Assessment: Shannan did well  in group therex with peers to assist in correct performance of activities. He benefits from moderate verbal cueing to attend and stay on task.     Strengths: Adequate strength, Independent prior level of function, Making steady progress towards goals, Motivated for self care and independence, Pleasant and cooperative, Supportive family, Willingly participates in therapeutic activities  Barriers: Aphasia expressive, Aphasia receptive, Hearing impairment, Impaired balance, Impaired insight/denial of deficits, Impaired functional cognition, Visual impairment    Plan:   FGA, floor transfer, ambulation outdoors  Dual task and problem solving with mobility (path finding)  R sided visual scanning/compensatory strategies     DME    PT DME Recommendations  Assistive Device:  (none anticipated)      Passport items to be completed:  Get in/out of bed safely, in/out of a vehicle, safely use mobility device, walk or wheel around home/community, navigate up and down stairs, show how to get up/down from the ground, ensure home is accessible, demonstrate HEP, complete caregiver training    Physical Therapy Problems (Active)       Problem: PT-Long Term Goals       Dates: Start:  03/22/25         Goal: LTG-By discharge, patient will improve score on Ding balance assessment to >/= 41/56 to indicate low fall risk       Dates: Start:  03/22/25            Goal: LTG-By discharge, patient will ambulate 500ft SPV no device       Dates: Start:  03/22/25            Goal: LTG-By discharge, patient will transfer one surface to another independently       Dates: Start:  03/22/25            Goal: LTG-By discharge, patient will participate in community outing with minimal verbal cueing to scan right in novel environment and no physical assistance required for mobility       Dates: Start:  03/22/25

## 2025-03-30 ENCOUNTER — APPOINTMENT (OUTPATIENT)
Dept: SPEECH THERAPY | Facility: REHABILITATION | Age: 75
DRG: 057 | End: 2025-03-30
Attending: PHYSICAL MEDICINE & REHABILITATION
Payer: COMMERCIAL

## 2025-03-30 ENCOUNTER — APPOINTMENT (OUTPATIENT)
Dept: OCCUPATIONAL THERAPY | Facility: REHABILITATION | Age: 75
DRG: 057 | End: 2025-03-30
Attending: PHYSICAL MEDICINE & REHABILITATION
Payer: COMMERCIAL

## 2025-03-30 PROCEDURE — A9270 NON-COVERED ITEM OR SERVICE: HCPCS | Performed by: PHYSICAL MEDICINE & REHABILITATION

## 2025-03-30 PROCEDURE — 700102 HCHG RX REV CODE 250 W/ 637 OVERRIDE(OP): Performed by: STUDENT IN AN ORGANIZED HEALTH CARE EDUCATION/TRAINING PROGRAM

## 2025-03-30 PROCEDURE — A9270 NON-COVERED ITEM OR SERVICE: HCPCS | Performed by: STUDENT IN AN ORGANIZED HEALTH CARE EDUCATION/TRAINING PROGRAM

## 2025-03-30 PROCEDURE — 97130 THER IVNTJ EA ADDL 15 MIN: CPT

## 2025-03-30 PROCEDURE — 700102 HCHG RX REV CODE 250 W/ 637 OVERRIDE(OP): Performed by: PHYSICAL MEDICINE & REHABILITATION

## 2025-03-30 PROCEDURE — 97129 THER IVNTJ 1ST 15 MIN: CPT

## 2025-03-30 PROCEDURE — 770010 HCHG ROOM/CARE - REHAB SEMI PRIVAT*

## 2025-03-30 PROCEDURE — 97110 THERAPEUTIC EXERCISES: CPT

## 2025-03-30 PROCEDURE — 97530 THERAPEUTIC ACTIVITIES: CPT

## 2025-03-30 RX ADMIN — LEVETIRACETAM 500 MG: 500 TABLET, FILM COATED ORAL at 08:34

## 2025-03-30 RX ADMIN — LEVETIRACETAM 500 MG: 500 TABLET, FILM COATED ORAL at 20:27

## 2025-03-30 RX ADMIN — OMEPRAZOLE 20 MG: 20 CAPSULE, DELAYED RELEASE ORAL at 08:34

## 2025-03-30 RX ADMIN — ACETAMINOPHEN 1000 MG: 500 TABLET ORAL at 08:34

## 2025-03-30 RX ADMIN — SENNOSIDES AND DOCUSATE SODIUM 2 TABLET: 50; 8.6 TABLET ORAL at 20:27

## 2025-03-30 ASSESSMENT — FIBROSIS 4 INDEX: FIB4 SCORE: 1.16

## 2025-03-30 ASSESSMENT — PAIN DESCRIPTION - PAIN TYPE: TYPE: ACUTE PAIN

## 2025-03-30 NOTE — THERAPY
Occupational Therapy  Daily Treatment     Patient Name:  Lico Caba  Age:  74 y.o., Sex:  male  Medical Record #:  [id number]  Today's Date:  3/30/2025    Precautions  Precautions: Fall Risk, Cognition/Communication  Fall Risk: Poor safety, Low vision, Impulsive, Alarming seat belt  Cognition/Communication: Receptive Aphasia  Swallow: Therapeutic dining  Comments: R visual field cut    Subjective: Pt seated in w/c watching baseball game positioned on his R upon arrival, pleasant and cooperative, agreeable to therapy       Objective:    03/30/25 1232   OT Charge Group   OT Therapy Activity (Units) 3   OT Therapeutic Exercise (Units) 1   OT Total Time Spent   OT Individual Total Time Spent (Mins) 60   Outcome Measures   Outcome Measures Utilized 9 Hole Peg Test   9 Hole Peg Test   Right Hand (seconds) 46   Left Hand (seconds) 35   Interdisciplinary Plan of Care Collaboration   Patient Position at End of Therapy In Bed;Bed Alarm On;Call Light within Reach;Tray Table within Reach     Therapeutic Activities  Purpose: to improve performance and function of daily activities, to provide patient and family education, and to increase safety with activities of daily life and mobility related to activities of daily life  Interventions:  In standing position, pt completed the [employer] Integrated Therapy System (BITS) an interactive touch screen therapy device to improve hand-eye coordination, reaction time, depth perception and working memory.    Maze test: 3:04 min to complete, 0 errors  Reaction time - 1 min test: 18 hits, 8 target misses, 3.31 sec reaction time  [place] test: 8:08 min to complete, 0 errors  Visual pursuits: seated toss/catch small ball with moving person    Therapeutic Exercise  Purpose: to improve strength, to improve ROM/flexibility, and to improve functional endurance  Interventions:   Standing UBE hydrocycle: level 1, 5 min, 0 RB  Functional mobility with no AD level: SBA room<>main gym, no  RB    Assessment:    Re-tested 9-hole peg test - pt cont to demo bilateral hand ataxia, but speed has improved. L > R hand, 1 peg dropped with R hand. Re-tested bells test - pt required increase time to complete, but made no errors compared to last test where majority of bells were missed on the R side.    Strengths: Independent prior level of function, Making steady progress towards goals, Motivated for self care and independence, Pleasant and cooperative, Supportive family, Willingly participates in therapeutic activities  Barriers: Aphasia expressive, Aphasia receptive, Confused, Decreased endurance, Difficulty following instructions, Fatigue, Generalized weakness, Impaired activity tolerance, Impaired balance, Impaired carryover of learning, Impaired insight/denial of deficits, Impaired functional cognition, Impulsive, Limited mobility, Visual impairment    Plan:  R visual scanning and safety awareness, RUE neuro re-ed (ataxia), functional cognition (memory, aphasia, apraxia, attention, problem solving), overall strength/endurance and standing balance     Spouse cleared to ambulate with pt at FWW level     Lives with spouse, 1 story, 1 CAROL ANN, WIS, tub    Occupational Therapy Goals (Active)       Problem: Bathing       Dates: Start:  03/22/25         Goal: STG-Within one week, patient will bathe at SBA level.       Dates: Start:  03/22/25         Goal Note filed on 03/24/25 1440 by Kaya Blackburn MS,OTR/L       Not yet addressed                 Problem: Dressing       Dates: Start:  03/22/25         Goal: STG-Within one week, patient will dress LB at SBA level.       Dates: Start:  03/22/25         Goal Note filed on 03/24/25 1440 by Kaya Blackburn MS,OTR/L       Not yet addressed                 Problem: Functional Transfers       Dates: Start:  03/22/25         Goal: STG-Within one week, patient will transfer to toilet at SBA level.       Dates: Start:  03/22/25         Goal Note filed on 03/24/25 1440  by Kaya Blackburn MS,OTR/L       Requires CGA              Goal: STG-Within one week, patient will transfer to tub/shower at SBA level.       Dates: Start:  03/22/25         Goal Note filed on 03/24/25 1440 by Kaya Blackburn MS,OTR/L       Not yet addressed                 Problem: OT Long Term Goals       Dates: Start:  03/22/25         Goal: LTG-By discharge, patient will complete basic self care tasks at SPV-Mod I level.       Dates: Start:  03/22/25            Goal: LTG-By discharge, patient will perform bathroom transfers at SPV-Mod I level.       Dates: Start:  03/22/25               Problem: Toileting       Dates: Start:  03/22/25         Goal: STG-Within one week, patient will complete toileting tasks at SBA level.       Dates: Start:  03/22/25         Goal Note filed on 03/24/25 1440 by Kaya Blackburn MS,OTR/L       Not yet addressed

## 2025-03-30 NOTE — PROGRESS NOTES
NURSING DAILY NOTE    Name: Lico Caba   Date of Admission: 3/21/2025   Admitting Diagnosis: Intracranial hemorrhage, spontaneous intraparenchymal, idiopathic, acute (HCC)  Attending Physician: CHRISTEL KILGORE D.O.  Allergies: Patient has no known allergies.    Safety  Patient Assist  family cleared  Patient Precautions  Precautions: Fall Risk, Cognition/Communication  Fall Risk: Poor safety, Low vision, Visual spatial neglect/Neglect program, Alarming seat belt, Impulsive  Cognition/Communication: Expressive Aphasia  Swallow: Therapeutic dining  Comments: R visual field cut  Bed Transfer Status  Supervised  Toilet Transfer Status   Stand By Assist  Assistive Devices  Rails, Wheelchair  Oxygen  None - Room Air  Diet/Therapeutic Dining  Current Diet Order   Procedures    Diet Order Diet: Regular (T-dine, meats cut up)     Pill Administration  whole  Agitated Behavioral Scale  17  ABS Level of Severity  No Agitation    Fall Risk  Has the patient had a fall this admission?      Genesis Elizondo Fall Risk Scoring  15, HIGH RISK  Fall Risk Safety Measures  bed alarm, chair alarm, seatbelt alarm, poor balance, and low vision/hearing    Vitals  Temperature: 36.7 °C (98.1 °F)  Temp src: Temporal  Pulse: 65  Respiration: 18  Blood Pressure : (!) 146/81  Blood Pressure MAP (Calculated): 103 MM HG  BP Location: Right, Upper Arm  Patient BP Position: Davis's Position     Oxygen  Pulse Oximetry: 97 %  O2 (LPM): 0  O2 Delivery Device: None - Room Air    Bowel and Bladder  Last Bowel Movement  03/27/25  Stool Type  Type 6: Fluffy pieces with ragged edges, a mushy stool  Bowel Device  Bathroom  Continent  Bladder: Did not void   Bowel: No movement  Bladder Function  Urine Void (mL): 500 ml  Number of Times Voided: 1  Urine Color: Unable To Evaluate  Number of Times Incontinent of Urine: 0  Genitourinary Assessment   Bladder Assessment (WDL):  Within Defined Limits  Urine Color: Unable To Evaluate  Number of Bladder  Accidents: 0  Total Number of Bladder of Accidents in Last 7 Days: 0  Number of Times Incontinent of Urine: 0  Bladder Device: Bathroom, Diaper, Urinal  Time Void: Yes  Bladder Scan: Post Void  $ Bladder Scan Results (mL): 34    Skin  Dereje Score   19  Sensory Interventions   Bed Types: Standard/Trauma Mattress  Skin Preventative Measures: Pillows in Use for Support / Positioning  Moisture Interventions         Pain  Pain Rating Scale  0 - No Pain  Pain Location  Head  Pain Location Orientation  Inner  Pain Interventions   Declines    ADLs    Bathing   Shower, * * With Assistance from, Family / Significant Other  Linen Change   Partial  Personal Hygiene     Chlorhexidine Bath      Oral Care  Brushed Teeth  Teeth/Dentures     Shave  Full Assist (spouse)  Nutrition Percentage Eaten  Dinner  Environmental Precautions  Treaded Slipper Socks on Patient, Personal Belongings, Wastebasket, Call Bell etc. in Easy Reach, Bed in Low Position  Patient Turns/Positioning  Patient turns self independently side to side without assistance, to offload sacral area  Patient Turns Assistance/Tolerance  Standby Assist  Bed Positions  Bed Controls On, Bed Locked  Head of Bed Elevated  Self regulated      Psychosocial/Neurologic Assessment  Psychosocial Assessment  Psychosocial (WDL):  WDL Except  Patient Behaviors: Forgetful  Family Behaviors: No Family Present  Neurologic Assessment  Neuro (WDL): Exceptions to WDL  Level of Consciousness: Alert  Orientation Level: Disoriented to time  Cognition: Follows commands, Poor attention/concentration, Appropriate safety awareness  Speech: Clear  Pupil Assesment: No  Motor Function/Sensation Assessment: Motor strength  Muscle Strength Right Arm: Good Strength Against Gravity and Moderate Resistance  Muscle Strength Left Arm: Good Strength Against Gravity and Moderate Resistance  Muscle Strength Right Leg: Good Strength Against Gravity and Moderate Resistance  Muscle Strength Left Leg: Good  Strength Against Gravity and Moderate Resistance  EENT (WDL):  WDL Except    Cardio/Pulmonary Assessment  Edema      Respiratory Breath Sounds  RUL Breath Sounds: Clear  RML Breath Sounds: Clear  RLL Breath Sounds: Diminished  ZACH Breath Sounds: Clear  LLL Breath Sounds: Diminished  Cardiac Assessment   Cardiac (WDL):  Within Defined Limits

## 2025-03-30 NOTE — CARE PLAN
"The patient is Stable - Low risk of patient condition declining or worsening    Shift Goals  Clinical Goals: safety  Patient Goals: sleep and rest  Family Goals: no family present    Progress made toward(s) clinical / shift goals:    Problem: Knowledge Deficit - Standard  Goal: Patient and family/care givers will demonstrate understanding of plan of care, disease process/condition, diagnostic tests and medications  Outcome: Progressing   Reviewed POC, all questions answered at this time.     Problem: Fall Risk - Rehab  Goal: Patient will remain free from falls  Outcome: Progressing  Genesis Elizondo Fall risk Assessment Score: 15    High fall risk Interventions   - Alarming seatbelt  - Wander guard  - Bed and strip alarm   - Yellow sign by the door   - Yellow wrist band \"Fall risk\"  - Room near to the nurse station  - Do not leave patient unattended in the bathroom  - Fall risk education provided    Problem: Pain - Standard  Goal: Alleviation of pain or a reduction in pain to the patient’s comfort goal  Outcome: Progressing  Patient able to verbalize pain level and verbalize an acceptable level of pain.     "

## 2025-03-30 NOTE — CARE PLAN
Problem: Self Care  Goal: Patient will have the ability to perform ADLs independently or with assistance  Outcome: Progressing  Note: Patient able to perform regular activities this shift.  Pain controlled this shift.  Pain management includes PRN pain meds as well as non-pharmacological measures such as emotional support, rest, and repositioning.  Will continue to monitor.   The patient is Stable - Low risk of patient condition declining or worsening    Shift Goals  Clinical Goals: safety  Patient Goals: sleep and rest  Family Goals: no family present    Progress made toward(s) clinical / shift goals:  pt participates in therapy and is cooperative with nursing    Patient is not progressing towards the following goals:

## 2025-03-30 NOTE — THERAPY
Speech Language Pathology  Daily Treatment     Patient Name:  Lico Caba  Age:  74 y.o., Sex:  male  Medical Record #:  [id number]  Today's Date: 3/30/2025    Precautions  Precautions: Fall Risk, Cognition/Communication  Fall Risk: Poor safety, Low vision, Impulsive, Alarming seat belt  Cognition/Communication: Receptive Aphasia  Swallow: Therapeutic dining  Comments: R visual field cut    Subjective: Wife present but did not participate in session. Reported she will leave for a little bit.      Objective:    03/30/25 0901   SLP Charge Group   SLP Cognitive Skill Development First 15 Minutes 1   SLP Cognitive Skill Development Additional 15 Minutes 1   SLP Total Time Spent   SLP Individual Total Time Spent (Mins) 30   Interdisciplinary Plan of Care Collaboration   IDT Collaboration with  Family / Caregiver   Patient Position at End of Therapy Seated;Chair Alarm On;Call Light within Reach;Tray Table within Reach   Collaboration Comments wife leaving at time of session.         Receptive Language/Auditory Comprehension  Purpose: to improve comprehension of spoken/written language  Interventions:   Identified numbers- single numbers using Social Reality agapito    1) 60% acc IND overall    - VFo3 100%     - VFo4 64%    -Vfo6 0%  Visual/Neglect  Purpose: to increase awareness of the affected visual field, enabling individual to better attend to their environment and engage in daily tasks safely, to improve scanning and visual tracking abilities, and to promote compensatory strategies allowing the individual to improve functional independence in ADL's and IADL's  Interventions:   Anchors    1) 20/22 91% acc IND- missed 2 targets on L side    2) 21/21 100% acc IND    3) 24/24 100% acc IND  Assessment:     Auditory- pt benefited from reduced visual field. Limited by Tactus agapito progressive increase in visual filed based off performance. Benefited from cues to slow down and not guess once initial answer was wrong in order  to locate correct number.     Neglect- targeted in visual scanning. Pt with errors in first trial with visual anchors in which pt was observed to jump around passage and move up/down vs. Left right. Given cues to read like a book left/right and visual anchors on both sides, pt increased to 100% (mod I) in remaining 2 trials.     Strengths: Able to follow instructions, Pleasant and cooperative, Supportive family, Willingly participates in therapeutic activities, Motivated for self care and independence  Barriers: Aphasia expressive, Aphasia receptive, Impaired functional cognition, Visual impairment    Plan:  Continue targeting number recongition, naming, visual scanning    Passport items to be completed:  Express basic needs, understand food/liquid recommendations, consistently follow swallow precautions, manage finances, manage medications, arrive to therapy appointments on time, complete daily memory log entries, solve problems related to safety situations, review education related to hospitalization, complete caregiver training     Speech Therapy Problems (Active)       Problem: Expression STGs       Dates: Start:  03/22/25         Goal: STG-Within one week, patient will       Dates: Start:  03/22/25       Description: 1) Individualized goal:  describe selected vocabulary by semantic features with 80% acc and MIN A.   2) Interventions:  SLP Speech Language Treatment, SLP Self Care / ADL Training , and SLP Group Treatment          Goal Note filed on 03/25/25 0808 by Alyse Anderson MS,CCC-SLP       Patient names pictures with 56% acc increasing to 80% with sentence completion cue.  Patient needs mod and occasionally max cues to implement use of semantic features to improve word finding.                 Problem: Memory STGs       Dates: Start:  03/22/25         Goal: STG-Within one week, patient will       Dates: Start:  03/22/25       Description: 1) Individualized goal:  complete working memory tasks w/ 80%  accuracy and MIN A.   2) Interventions:  SLP Self Care / ADL Training , SLP Cognitive Skill Development, and SLP Group Treatment          Goal Note filed on 03/24/25 1512 by Alyse Anderson MS,CCC-SLP       To be addressed.                  Problem: Problem Solving STGs       Dates: Start:  03/22/25         Goal: STG-Within one week, patient will       Dates: Start:  03/22/25       Description: 1) Individualized goal:  complete right scanning/neglect tasks w/ MIN A and 80% acc.   2) Interventions:  SLP Self Care / ADL Training , SLP Cognitive Skill Development, and SLP Group Treatment          Goal Note filed on 03/24/25 1512 by Alyse Anderson MS,CCC-SLP       Patient needs min to mod cues for 80% acc in environ mental scans related to eating.  Continue goal.                 Problem: Social Interaction STGs       Dates: Start:  03/22/25         Goal: STG-Within one week, patient will       Dates: Start:  03/22/25               Problem: Speech/Swallowing LTGs       Dates: Start:  03/22/25         Goal: LTG-By discharge, patient will safely swallow       Dates: Start:  03/22/25       Description: 1) Individualized goal:  regular textures and thin liquids implementing safe swallow strategies in >90% of opportunities with MOD I for a safe D/C to PLOF.   2) Interventions:  SLP Swallowing Dysfunction Treatment, SLP Self Care / ADL Training , and SLP Group Treatment             Goal: LTG-By discharge, patient will solve basic problems       Dates: Start:  03/22/25       Description: 1) Individualized goal:  w/ 80% acc and MOD I for a safe D/C to PLOF.   2) Interventions:  SLP Speech Language Treatment, SLP Self Care / ADL Training , SLP Cognitive Skill Development, and SLP Group Treatment                Problem: Swallowing STGs       Dates: Start:  03/22/25         Goal: STG-Within one week, patient will       Dates: Start:  03/22/25

## 2025-03-31 ENCOUNTER — APPOINTMENT (OUTPATIENT)
Dept: OCCUPATIONAL THERAPY | Facility: REHABILITATION | Age: 75
DRG: 057 | End: 2025-03-31
Attending: PHYSICAL MEDICINE & REHABILITATION
Payer: COMMERCIAL

## 2025-03-31 ENCOUNTER — APPOINTMENT (OUTPATIENT)
Dept: SPEECH THERAPY | Facility: REHABILITATION | Age: 75
DRG: 057 | End: 2025-03-31
Attending: PHYSICAL MEDICINE & REHABILITATION
Payer: COMMERCIAL

## 2025-03-31 ENCOUNTER — APPOINTMENT (OUTPATIENT)
Dept: PHYSICAL THERAPY | Facility: REHABILITATION | Age: 75
DRG: 057 | End: 2025-03-31
Attending: PHYSICAL MEDICINE & REHABILITATION
Payer: COMMERCIAL

## 2025-03-31 VITALS
WEIGHT: 176.37 LBS | TEMPERATURE: 98.9 F | SYSTOLIC BLOOD PRESSURE: 122 MMHG | HEART RATE: 71 BPM | HEIGHT: 73 IN | OXYGEN SATURATION: 96 % | RESPIRATION RATE: 18 BRPM | DIASTOLIC BLOOD PRESSURE: 67 MMHG | BODY MASS INDEX: 23.37 KG/M2

## 2025-03-31 PROCEDURE — 92507 TX SP LANG VOICE COMM INDIV: CPT

## 2025-03-31 PROCEDURE — 97530 THERAPEUTIC ACTIVITIES: CPT

## 2025-03-31 PROCEDURE — 99232 SBSQ HOSP IP/OBS MODERATE 35: CPT | Performed by: PHYSICAL MEDICINE & REHABILITATION

## 2025-03-31 PROCEDURE — 700102 HCHG RX REV CODE 250 W/ 637 OVERRIDE(OP): Performed by: STUDENT IN AN ORGANIZED HEALTH CARE EDUCATION/TRAINING PROGRAM

## 2025-03-31 PROCEDURE — 700102 HCHG RX REV CODE 250 W/ 637 OVERRIDE(OP): Performed by: PHYSICAL MEDICINE & REHABILITATION

## 2025-03-31 PROCEDURE — 97116 GAIT TRAINING THERAPY: CPT

## 2025-03-31 PROCEDURE — A9270 NON-COVERED ITEM OR SERVICE: HCPCS | Performed by: STUDENT IN AN ORGANIZED HEALTH CARE EDUCATION/TRAINING PROGRAM

## 2025-03-31 PROCEDURE — 770010 HCHG ROOM/CARE - REHAB SEMI PRIVAT*

## 2025-03-31 PROCEDURE — A9270 NON-COVERED ITEM OR SERVICE: HCPCS | Performed by: PHYSICAL MEDICINE & REHABILITATION

## 2025-03-31 RX ORDER — DONEPEZIL HYDROCHLORIDE 5 MG/1
5 TABLET, FILM COATED ORAL DAILY
Status: DISCONTINUED | OUTPATIENT
Start: 2025-03-31 | End: 2025-04-03 | Stop reason: HOSPADM

## 2025-03-31 RX ADMIN — ACETAMINOPHEN 1000 MG: 500 TABLET ORAL at 14:54

## 2025-03-31 RX ADMIN — POLYETHYLENE GLYCOL 3350 1 PACKET: 17 POWDER, FOR SOLUTION ORAL at 06:20

## 2025-03-31 RX ADMIN — ACETAMINOPHEN 1000 MG: 500 TABLET ORAL at 01:36

## 2025-03-31 RX ADMIN — SENNOSIDES AND DOCUSATE SODIUM 2 TABLET: 50; 8.6 TABLET ORAL at 20:08

## 2025-03-31 RX ADMIN — LEVETIRACETAM 500 MG: 500 TABLET, FILM COATED ORAL at 20:08

## 2025-03-31 RX ADMIN — OMEPRAZOLE 20 MG: 20 CAPSULE, DELAYED RELEASE ORAL at 08:15

## 2025-03-31 RX ADMIN — DONEPEZIL HYDROCHLORIDE 5 MG: 5 TABLET, FILM COATED ORAL at 12:10

## 2025-03-31 RX ADMIN — LEVETIRACETAM 500 MG: 500 TABLET, FILM COATED ORAL at 08:15

## 2025-03-31 ASSESSMENT — PAIN DESCRIPTION - PAIN TYPE
TYPE: ACUTE PAIN
TYPE: CHRONIC PAIN

## 2025-03-31 NOTE — THERAPY
Occupational Therapy  Daily Treatment     Patient Name:  Lico Caba  Age:  74 y.o., Sex:  male  Medical Record #:  [id number]  Today's Date:  3/31/2025    Precautions  Precautions: Fall Risk, Cognition/Communication  Fall Risk: Poor safety, Low vision, Impulsive, Poor balance, Alarming seat belt  Cognition/Communication: Receptive Aphasia  Swallow: Therapeutic dining  Comments: R Visual Field Cut    Subjective: Pt seated in w/c upon arrival, pleasant and cooperative, agreeable to therapy       Objective:    25 0831   OT Charge Group   OT Therapy Activity (Units) 4   OT Total Time Spent   OT Individual Total Time Spent (Mins) 60   Interdisciplinary Plan of Care Collaboration   Patient Position at End of Therapy Seated;Chair Alarm On;Self Releasing Lap Belt Applied;Call Light within Reach;Tray Table within Reach     Therapeutic Activities  Purpose: to improve performance and function of daily activities, to provide patient and family education, and to increase safety with activities of daily life and mobility related to activities of daily life  Interventions:  Cleared spouse to assist pt with showers - edu provided on safety precautions and for set-up  Visual pursuits, navigating around obstacles (dining room tables) for overall safety awareness, dynamic balance, divided attention, eye-hand coordination:  Static standinnd person moving throughout room displaying large playing cards - pt requited Max A to name correct card number while finding moving target  Ambulating forward/backward: while following 2nd person throughout room toss/catch small ball,   Seated: bounce/catch small ball to self  Ambulating forward and stepping over mini-hurdles: while following 2nd person throughout room and answering questions    Assessment:    Pt cont to demo good progress with managing standing balance/ambulation with distractions present. Cont to require SBA for safety d/t intermittent bumping into items on  the R side    Strengths: Independent prior level of function, Making steady progress towards goals, Motivated for self care and independence, Pleasant and cooperative, Supportive family, Willingly participates in therapeutic activities  Barriers: Aphasia expressive, Aphasia receptive, Confused, Decreased endurance, Difficulty following instructions, Fatigue, Generalized weakness, Impaired activity tolerance, Impaired balance, Impaired carryover of learning, Impaired insight/denial of deficits, Impaired functional cognition, Impulsive, Limited mobility, Visual impairment    Plan:  R visual scanning and safety awareness, RUE neuro re-ed (ataxia), functional cognition (memory, aphasia, apraxia, attention, problem solving), overall strength/endurance and standing balance     Spouse cleared to ambulate with pt at FWW level     Lives with spouse, 1 story, 1 CAROL ANN, WIS, tub    Occupational Therapy Goals (Active)       Problem: Bathing       Dates: Start:  03/22/25         Goal: STG-Within one week, patient will bathe at SBA level.       Dates: Start:  03/22/25         Goal Note filed on 03/24/25 1440 by Kaya Blackburn MS,OTR/L       Not yet addressed                 Problem: Dressing       Dates: Start:  03/22/25         Goal: STG-Within one week, patient will dress LB at SBA level.       Dates: Start:  03/22/25         Goal Note filed on 03/24/25 1440 by Kaya Blackburn MS,OTR/L       Not yet addressed                 Problem: Functional Transfers       Dates: Start:  03/22/25         Goal: STG-Within one week, patient will transfer to toilet at SBA level.       Dates: Start:  03/22/25         Goal Note filed on 03/24/25 1440 by Kaya Blackburn MS,OTR/L       Requires CGA              Goal: STG-Within one week, patient will transfer to tub/shower at SBA level.       Dates: Start:  03/22/25         Goal Note filed on 03/24/25 1440 by Kaya Blackburn MS,OTR/L       Not yet addressed                 Problem:  OT Long Term Goals       Dates: Start:  03/22/25         Goal: LTG-By discharge, patient will complete basic self care tasks at SPV-Mod I level.       Dates: Start:  03/22/25            Goal: LTG-By discharge, patient will perform bathroom transfers at SPV-Mod I level.       Dates: Start:  03/22/25               Problem: Toileting       Dates: Start:  03/22/25         Goal: STG-Within one week, patient will complete toileting tasks at SBA level.       Dates: Start:  03/22/25         Goal Note filed on 03/24/25 1440 by Kaya Blackburn MS,OTR/L       Not yet addressed

## 2025-03-31 NOTE — THERAPY
Speech Language Pathology  Daily Treatment     Patient Name:  Lico Caba  Age:  74 y.o., Sex:  male  Medical Record #:  [id number]  Today's Date: 3/31/2025    Precautions  Precautions: Fall Risk, Cognition/Communication  Fall Risk: Poor safety, Low vision, Alarming seat belt, Visual spatial neglect/Neglect program  Cognition/Communication: Receptive Aphasia, Expressive Aphasia  Swallow: Therapeutic dining  Comments: R Visual Field Cut    Subjective: The pt was found seated in his room with his wife present. He was pleasant and agreeable to ST session.     Objective:    03/31/25 1003   SLP Charge Group   SLP Treatment - Individual Speech Language Treatment - Individual   SLP Total Time Spent   SLP Individual Total Time Spent (Mins) 60   Precautions   Precautions Fall Risk;Cognition/Communication   Fall Risk Poor safety;Low vision;Alarming seat belt;Visual spatial neglect/Neglect program   Cognition/Communication Receptive Aphasia;Expressive Aphasia   Comments R Visual Field Cut   Impairment Assessments   Impairment Assessments Comprehension;Expression;Social Interaction;Problem Solving;Memory;Dysphagia   Comprehension Level of Assist Minimal Assist   Comprehension Description Cueing needed;Extra time needed   Expression Level of Assist Moderate Assist   Expression Description Set up of equipment;Extra time needed;Cueing needed   Social Interaction Level of Assist Modified Independent   Social Interaction Description Extra time needed   Problem Solving Level of Assist Maximal Assist   Problem Solving Description Cueing needed;Extra time needed   Interdisciplinary Plan of Care Collaboration   IDT Collaboration with  Family / Caregiver   Patient Position at End of Therapy Seated;Chair Alarm On;Self Releasing Lap Belt Applied;Call Light within Reach;Tray Table within Reach   Collaboration Comments Wife present in room at start of ST         Receptive Language/Auditory Comprehension  Purpose: to improve  "comprehension of spoken/written language and to improve comprehension of instructions  Interventions:   Understands simple/structured conversation  Understands complex conversation    Expressive Language  Purpose: to improve confidence in verbal expression across different environments (i.e. home, work, school, social settings, etc.), to improve the ability to verbally express thoughts, needs, emotions, and ideas in everyday communication, and to improve the use of appropriate vocabulary, sentence structure, and grammar  Interventions:   Naming  Expressive language deficits characterized by: Word Finding Deficits (Anomia)     Reading Comprehension  Purpose: to improve the ability to comprehend written language and to support the development of strategies for decoding words for both simple and complex written materials  Interventions:   Matches letters  Matches numbers  Barriers to Reading Comprehension: Vision/visual processing, Neglect, and aphasia     The pt visual scanning tasks with both numbers and letters in a FO10 achieving 100% accuracy.   He achieved 15/20 (75%) accuracy independently for written number ID tasks (1-10 in order and out of order) with SLP stating \"point to the number 5\" increasing to 17/20 (85%) given min A.   Pt able to verbalize single digit numbers with FAIR accuracy, pt unable to verbalize double digit numbers nor comprehend their value.   Pt indep aware that single digit numbers was \"easier\" than double digits.   Picture naming was initiated with the pt receiving 2/3 (67%) independently.   Spelling of single syllable words with letter tiles completed with MOD A overall (SPV for 3 letter words, 4-5 letters requiring additional assist)    Assessment:     The pt completed various number/letter matching tasks with 100% accuracy. He exhibited increased difficulty when asked to verbally ID the target letter/number requiring max A (semantic/phonemic cues and modeling). This activity was abandon " due to complexity level. The pt stated a preference for picture naming/spelling with letter tiles and this activity was initiated. He required overall min-mod A to name and spell the target object (first letter, word length, phonemic, semantic, sentence completion cues). The pt's HEP was reviewed at the end of the session and practicing copying letters/numbers was reinforced with the pt in agreement to complete activities.     Strengths: Able to follow instructions, Pleasant and cooperative, Supportive family, Willingly participates in therapeutic activities, Motivated for self care and independence  Barriers: Aphasia expressive, Aphasia receptive, Impaired functional cognition, Visual impairment    Plan:  Continue picture naming/spelling with letter tiles  Continue SFA  Check HEP (in pt room)    Speech Therapy Problems (Active)       Problem: Expression STGs       Dates: Start:  03/22/25         Goal: STG-Within one week, patient will       Dates: Start:  03/22/25       Description: 1) Individualized goal:  describe selected vocabulary by semantic features with 80% acc and MIN A.   2) Interventions:  SLP Speech Language Treatment, SLP Self Care / ADL Training , and SLP Group Treatment          Goal Note filed on 03/25/25 0808 by Alyse Anderson MS,CCC-SLP       Patient names pictures with 56% acc increasing to 80% with sentence completion cue.  Patient needs mod and occasionally max cues to implement use of semantic features to improve word finding.                 Problem: Memory STGs       Dates: Start:  03/22/25         Goal: STG-Within one week, patient will       Dates: Start:  03/22/25       Description: 1) Individualized goal:  complete working memory tasks w/ 80% accuracy and MIN A.   2) Interventions:  SLP Self Care / ADL Training , SLP Cognitive Skill Development, and SLP Group Treatment          Goal Note filed on 03/24/25 1512 by Alyse Anderson MS,CCC-SLP       To be addressed.                   Problem: Problem Solving STGs       Dates: Start:  03/22/25         Goal: STG-Within one week, patient will       Dates: Start:  03/22/25       Description: 1) Individualized goal:  complete right scanning/neglect tasks w/ MIN A and 80% acc.   2) Interventions:  SLP Self Care / ADL Training , SLP Cognitive Skill Development, and SLP Group Treatment          Goal Note filed on 03/24/25 1512 by Alyse Anderson MS,CCC-SLP       Patient needs min to mod cues for 80% acc in environ mental scans related to eating.  Continue goal.                 Problem: Social Interaction STGs       Dates: Start:  03/22/25         Goal: STG-Within one week, patient will       Dates: Start:  03/22/25               Problem: Speech/Swallowing LTGs       Dates: Start:  03/22/25         Goal: LTG-By discharge, patient will safely swallow       Dates: Start:  03/22/25       Description: 1) Individualized goal:  regular textures and thin liquids implementing safe swallow strategies in >90% of opportunities with MOD I for a safe D/C to PLOF.   2) Interventions:  SLP Swallowing Dysfunction Treatment, SLP Self Care / ADL Training , and SLP Group Treatment             Goal: LTG-By discharge, patient will solve basic problems       Dates: Start:  03/22/25       Description: 1) Individualized goal:  w/ 80% acc and MOD I for a safe D/C to PLOF.   2) Interventions:  SLP Speech Language Treatment, SLP Self Care / ADL Training , SLP Cognitive Skill Development, and SLP Group Treatment                Problem: Swallowing STGs       Dates: Start:  03/22/25         Goal: STG-Within one week, patient will       Dates: Start:  03/22/25

## 2025-03-31 NOTE — PROGRESS NOTES
NURSING DAILY NOTE    Name: Lico Caba   Date of Admission: 3/21/2025   Admitting Diagnosis: Intracranial hemorrhage, spontaneous intraparenchymal, idiopathic, acute (HCC)  Attending Physician: CHRISTEL KILGORE D.O.  Allergies: Patient has no known allergies.    Safety  Patient Assist  min, wife is cleared to transfer  Patient Precautions  Precautions: Fall Risk, Cognition/Communication  Fall Risk: Poor safety, Low vision, Impulsive, Poor balance, Alarming seat belt  Cognition/Communication: Receptive Aphasia  Swallow: Therapeutic dining  Comments: R Visual Field Cut  Bed Transfer Status  Supervised  Toilet Transfer Status   Stand By Assist  Assistive Devices  Rails, Wheelchair  Oxygen  None - Room Air  Diet/Therapeutic Dining  Current Diet Order   Procedures    Diet Order Diet: Regular (T-dine, meats cut up)     Pill Administration  whole  Agitated Behavioral Scale  17  ABS Level of Severity  No Agitation    Fall Risk  Has the patient had a fall this admission?      Genesis Elizondo Fall Risk Scoring  22, HIGH RISK  Fall Risk Safety Measures  bed alarm, chair alarm, and seatbelt alarm    Vitals  Temperature: 36.7 °C (98 °F)  Temp src: Temporal  Pulse: 63  Respiration: 18  Blood Pressure : (!) 151/74  Blood Pressure MAP (Calculated): 100 MM HG  BP Location: Right, Upper Arm  Patient BP Position: Davis's Position     Oxygen  Pulse Oximetry: 98 %  O2 (LPM): 0  O2 Delivery Device: None - Room Air    Bowel and Bladder  Last Bowel Movement  03/27/25  Stool Type  Type 6: Fluffy pieces with ragged edges, a mushy stool  Bowel Device  Bathroom  Continent  Bladder: Did not void   Bowel: No movement  Bladder Function  Urine Void (mL): 500 ml  Number of Times Voided: 1  Urine Color: Yellow  Number of Times Incontinent of Urine: 0  Genitourinary Assessment   Bladder Assessment (WDL):  WDL Except  Urine Color: Yellow  Number of Bladder Accidents: 0  Total Number of Bladder of Accidents in Last 7 Days: 0  Number of  Times Incontinent of Urine: 0  Bladder Device: Bathroom, Diaper, Urinal  Time Void: Yes  Bladder Scan: Post Void  $ Bladder Scan Results (mL): 34    Skin  Dereje Score   19  Sensory Interventions   Bed Types: Standard/Trauma Mattress  Skin Preventative Measures: Pillows in Use for Support / Positioning  Moisture Interventions         Pain  Pain Rating Scale  3 - Sometimes distracts me  Pain Location  Head  Pain Location Orientation  Anterior  Pain Interventions   Medication (see MAR)    ADLs    Bathing   Shower, * * With Assistance from, Family / Significant Other  Linen Change   Partial  Personal Hygiene     Chlorhexidine Bath      Oral Care  Brushed Teeth  Teeth/Dentures     Shave  Full Assist (spouse)  Nutrition Percentage Eaten  *  * Meal *  *, Dinner, *  * Amount Consumed *  *, Between % Consumed  Environmental Precautions  Treaded Slipper Socks on Patient, Personal Belongings, Wastebasket, Call Bell etc. in Easy Reach, Bed in Low Position  Patient Turns/Positioning  Patient turns self independently side to side without assistance, to offload sacral area  Patient Turns Assistance/Tolerance  Standby Assist  Bed Positions  Bed Controls On  Head of Bed Elevated  Self regulated      Psychosocial/Neurologic Assessment  Psychosocial Assessment  Psychosocial (WDL):  WDL Except  Patient Behaviors: Forgetful  Family Behaviors: No Family Present  Neurologic Assessment  Neuro (WDL): Exceptions to WDL  Level of Consciousness: Alert  Orientation Level: Disoriented to time  Cognition: Follows commands, Poor attention/concentration, Appropriate safety awareness  Speech: Clear  Pupil Assesment: No  Motor Function/Sensation Assessment: Motor strength  Muscle Strength Right Arm: Good Strength Against Gravity and Moderate Resistance  Muscle Strength Left Arm: Good Strength Against Gravity and Moderate Resistance  Muscle Strength Right Leg: Good Strength Against Gravity and Moderate Resistance  Muscle Strength Left Leg: Good  Strength Against Gravity and Moderate Resistance  EENT (WDL):  WDL Except    Cardio/Pulmonary Assessment  Edema      Respiratory Breath Sounds  RUL Breath Sounds: Clear  RML Breath Sounds: Clear  RLL Breath Sounds: Diminished  ZACH Breath Sounds: Clear  LLL Breath Sounds: Diminished  Cardiac Assessment   Cardiac (WDL):  Within Defined Limits

## 2025-03-31 NOTE — CARE PLAN
Problem: Problem-solving STGs  Goal: STG- Within one week, patient will   1) Individualized goal:  complete right scanning/neglect tasks w/ MIN A and 80% acc.   2) Interventions: SLP Self Care/ADL Training, SLP Cognitive Skill Development, and SLP Group Treatment    Outcome: Met  Note: Pt achieved 80% accuracy or above for visual scanning tasks given Mod-I.    Problem: Memory STGs  Goal: STG-Within one week, patient will  Description: 1) Individualized goal:  complete working memory tasks w/ 80% accuracy and MIN A.   2) Interventions:  SLP Self Care / ADL Training , SLP Cognitive Skill Development, and SLP Group Treatment      Outcome: Not Met  Note: Goal not targeted. Initiate working memory tasks.      Problem: Expression STGs  Goal: STG-Within one week, patient will  Description: 1) Individualized goal:  describe selected vocabulary by semantic features with 80% acc and MIN A.   2) Interventions:  SLP Speech Language Treatment, SLP Self Care / ADL Training , and SLP Group Treatment      Outcome: Not Met  Note: Pt requires mod-max A to complete SFA tasks.

## 2025-03-31 NOTE — CARE PLAN
Problem: Bathing  Goal: STG-Within one week, patient will bathe at SBA level.  Outcome: Met     Problem: Dressing  Goal: STG-Within one week, patient will dress LB at SBA level.  Outcome: Met     Problem: Toileting  Goal: STG-Within one week, patient will complete toileting tasks at SBA level.  Outcome: Met     Problem: Functional Transfers  Goal: STG-Within one week, patient will transfer to toilet at SBA level.  Outcome: Met  Goal: STG-Within one week, patient will transfer to tub/shower at SBA level.  Outcome: Met

## 2025-03-31 NOTE — CARE PLAN
Problem: Mobility  Goal: Patient's capacity to carry out activities will improve  Outcome: Progressing  Note: Patient able to perform regular activities this shift.  Pain controlled this shift.  Pain management includes PRN pain meds as well as non-pharmacological measures such as emotional support, rest, and repositioning.  Will continue to monitor.   The patient is Stable - Low risk of patient condition declining or worsening    Shift Goals  Clinical Goals: safety  Patient Goals: sleep and rest  Family Goals: no family present    Progress made toward(s) clinical / shift goals:  pt participates in therapy and is cooperative with nursing    Patient is not progressing towards the following goals:

## 2025-03-31 NOTE — PROGRESS NOTES
"  Physical Medicine & Rehabilitation Progress Note    Encounter Date: 3/31/2025    Chief Complaint: Weakness    Interval Events (Subjective):  Seen in bed. Tolerating therapy. No new concerns.    Objective:  VITAL SIGNS: /75   Pulse 79   Temp 36.5 °C (97.7 °F) (Tympanic)   Resp 18   Ht 1.854 m (6' 1\")   Wt 80 kg (176 lb 5.9 oz)   SpO2 96%   BMI 23.27 kg/m²   Gen: No acute distress, well developed well nourished adult  HEENT: Normal Cephalic Atraumatic, Normal conjunctiva.   CV: warm extremities, well perfused, no edema  Resp: symmetric chest rise, breathing comfortably on room air  Abd: Soft, Non distended  Extremities: normal bulk, no atrophy  Skin: no visible rashes or lesions.   Neuro: alert, awake  Psych: Mood and affect appropriate and congruent    Laboratory Values:  No results found for this or any previous visit (from the past 72 hours).    Medications:  Scheduled Medications   Medication Dose Frequency    Pharmacy Consult Request  1 Each PHARMACY TO DOSE    senna-docusate  2 Tablet Q EVENING    omeprazole  20 mg DAILY    levETIRAcetam  500 mg BID     PRN medications: acetaminophen, Respiratory Therapy Consult, hydrALAZINE, senna-docusate **AND** polyethylene glycol/lytes, ondansetron **OR** ondansetron, traZODone, sodium chloride, butalbital/apap/caffeine, oxyCODONE immediate-release    Diet:  Current Diet Order   Procedures    Diet Order Diet: Regular (T-dine, meats cut up)       Medical Decision Making and Plan:  CVA:  Visual Agnosia  Right hemianopsia  Impaired attention  - Patient with L hemorraghic stroke side/type of stroke on 3/16 date. Etiology likely hypertensive. Received no intervention  -on keppra 500mg BID  -EKG today show no qtc  -routine ct head to monitor progression of IPH     Bilateral lower extremity edema  -3/25 - dopplers to rule out blood clot  -continue ambulation as much a possible  -elevate legs in bed     Leukocytosis -resolved  -likely due to prednisone     Cognitive " Communicative deficits:  - Patient with significant cognitive deficits due to aphasia  - Environmental modifications to decrease excessive stimulation including turning off the lights  - Consider starting neurostimulants such Nuvigil, amantadine or methylphenidate  - SLP to evaluate and treat cognitive deficits.   -Neuropsych will follow and perform testing if/when appropriate.  3/31- start aricept        Hemiparesis:  - Patient with R dom hemiparesis  - consider starting SSRI per FLAME trial to facilitate post-stroke motor recovery  - Continue aggressive therapies with PT and OT to strengthen and optimize function.     Spasticity:  - Patient at risk of tone in the affected limbs/muscles  - PT and OT to initiate aggressive stretching, ROM exercises, bracing, splinting, casting and positioning as appropriate.  - If the tone fails to improve with the above conservative measures, consider further intervention with botulinum toxin or phenol injections.     Dysphagia/Nutrition:  - Patient currently on dysphagia DIET.  - Continue therapies with SLP. SLP to perform swallow evaluation and provide oral motor exercises if necessary.        Neurogenic bladder:  - Timed voids with PVR q4H x3. If PVR > 400mL or if patient is unable to void, straight cath patient.     Neurogenic bowel:  -  Colace, Senna BID on admission  - Goal of 1BM/day.  -Last BM: 03/20/25 (Per report)        Circadian Rhythm disorder:   -Trazadone 50mg PRN for restlessness after 10pm  Recommend lights on during the day/off at night, minimize nighttime interruptions as able.     Mood  - at risk of adjustment disorder, depression, and anxiety due to functional decline     ID:  - at risk for Urinary tract infection     Skin/Wounds:  - Pressure relief q2h while in bed. Close monitoring for signs of breakdown     Pain:  - Neuroceptic - On Tylenol prn, fiorecet, prednisone x 4 days completed. Continue tylenol and fiorecet     DVT prophylaxis:  continues to be  contraindicated. Dopplers negative 3/27     GI prophylaxis:  On Prilosec 20mg daily         -Follow-up Neurology, PCP          ____________________________________    Vivek Klein MD  Physical Medicine & Rehabilitation   Brain Injury Medicine   ____________________________________

## 2025-03-31 NOTE — CARE PLAN
"The patient is Watcher - Medium risk of patient condition declining or worsening    Shift Goals  Clinical Goals: safety  Patient Goals: sleep and rest  Family Goals: no family present    Progress made toward(s) clinical / shift goals:    Problem: Knowledge Deficit - Standard  Goal: Patient and family/care givers will demonstrate understanding of plan of care, disease process/condition, diagnostic tests and medications  Outcome: Progressing     Problem: Fall Risk - Rehab  Goal: Patient will remain free from falls  Outcome: Progressing  Note: Genesis Elizondo Fall risk Assessment Score: 22    High fall risk Interventions   - Bed and strip alarm   - Yellow sign by the door   - Yellow wrist band \"Fall risk\"  - Room near to the nurse station  - Do not leave patient unattended in the bathroom  - Fall risk education provided       Problem: Hemodynamics  Goal: Patient's hemodynamics, fluid balance and neurologic status will be stable or improve  Outcome: Progressing     Problem: Bladder / Voiding  Goal: Patient will establish and maintain regular urinary output  Outcome: Progressing             "

## 2025-03-31 NOTE — THERAPY
Physical Therapy   Daily Treatment     Patient Name:  Lico Caba  Age:  74 y.o., Sex:  male  Medical Record #:  [id number]  Today's Date: 3/31/2025     Precautions  Precautions: (P) Fall Risk, Cognition/Communication  Fall Risk: (P) Poor balance, Low vision, Alarming seat belt, Visual spatial neglect/Neglect program  Cognition/Communication: (P) Receptive Aphasia, Expressive Aphasia  Swallow: (P) Therapeutic dining  Comments: (P) R visual field cut    Subjective: Pt in room and agreeable to participate. Spouse present throughout     Objective:    03/31/25 1501   PT Charge Group   PT Gait Training (Units) 2   PT Therapeutic Activities (Units) 2   PT Total Time Spent   PT Individual Total Time Spent (Mins) 60   Precautions   Precautions Fall Risk;Cognition/Communication   Fall Risk Poor balance;Low vision;Alarming seat belt;Visual spatial neglect/Neglect program   Cognition/Communication Receptive Aphasia;Expressive Aphasia   Swallow Therapeutic dining   Comments R visual field cut   Sit to Stand   Level of Assist Independent   Assistive Devices No AD   Chair/Bed-to-Chair Transfer   Level of Assist Supervised   Transfer Type Stand Step Transfer   Assistive Devices No AD   Additional Description Cueing needed   Ambulation   Level of Assist Stand By Assist   Assistive Device No AD   Additional Description Cueing needed;Extra time needed   Distance 700'   Interdisciplinary Plan of Care Collaboration   IDT Collaboration with  Family / Caregiver   Patient Position at End of Therapy Seated;Chair Alarm On;Self Releasing Lap Belt Applied;Call Light within Reach;Tray Table within Reach   Collaboration Comments spouse present     Floor recovery performed CGA  - cues for sequencing and to transfer to nearby sturdy piece of furniture. Pt attempted to stand up from ground without mat table.     Gait training with no AD  - 120' x 2     Pathfinding back to pt's room from entrance of hospital (room 101 to room 127)   -  Difficulty with recognizing and naming room numbers, but able to follow sequential order of numbers (ie, could identify that 7 was larger than 6, so his room must be further down the infante)    Dual task:   - ambulation while balancing a ball on top of tennis racket (practiced with both hands). Stride length and height with noticeable decrease with dual tasking. Pt attributes this to confidence with dual task. Able to improve with cueing.   - catching a ball while standing, naming object in category, tossing ball back to therapist. Pt had moderate difficulty with word finding when standing. Same task was performed sitting with much less word finding difficulty. Pt receptive to education regarding performing tasks that require focus in a seated position, with minimal distractors.     Assessment: Pt had difficulty with word finding and gait normality when performing dual tasking. Recommend pt remain seated with minimal distractions when performing cognitive tasks.     Strengths: Adequate strength, Independent prior level of function, Making steady progress towards goals, Motivated for self care and independence, Pleasant and cooperative, Supportive family, Willingly participates in therapeutic activities  Barriers: Aphasia expressive, Aphasia receptive, Hearing impairment, Impaired balance, Impaired insight/denial of deficits, Impaired functional cognition, Visual impairment    Plan:   FGA, floor transfer, ambulation outdoors  Dual task and problem solving with mobility (path finding)  R sided visual scanning/compensatory strategies     DME    PT DME Recommendations  Assistive Device:  (none anticipated)      Passport items to be completed:  Get in/out of bed safely, in/out of a vehicle, safely use mobility device, walk or wheel around home/community, navigate up and down stairs, show how to get up/down from the ground, ensure home is accessible, demonstrate HEP, complete caregiver training    Physical Therapy Problems  (Active)       Problem: PT-Long Term Goals       Dates: Start:  03/22/25         Goal: LTG-By discharge, patient will improve score on Ding balance assessment to >/= 41/56 to indicate low fall risk       Dates: Start:  03/22/25            Goal: LTG-By discharge, patient will ambulate 500ft SPV no device       Dates: Start:  03/22/25            Goal: LTG-By discharge, patient will transfer one surface to another independently       Dates: Start:  03/22/25            Goal: LTG-By discharge, patient will participate in community outing with minimal verbal cueing to scan right in novel environment and no physical assistance required for mobility       Dates: Start:  03/22/25

## 2025-03-31 NOTE — PROGRESS NOTES
NURSING DAILY NOTE    Name: Lico Caba   Date of Admission: 3/21/2025   Admitting Diagnosis: Intracranial hemorrhage, spontaneous intraparenchymal, idiopathic, acute (HCC)  Attending Physician: CHRISTEL KILGORE D.O.  Allergies: Patient has no known allergies.    Safety  Patient Assist  min, wife is cleared to transfer  Patient Precautions  Precautions: Fall Risk, Cognition/Communication  Fall Risk: Poor safety, Low vision, Impulsive, Alarming seat belt  Cognition/Communication: Receptive Aphasia  Swallow: Therapeutic dining  Comments: R visual field cut  Bed Transfer Status  Supervised  Toilet Transfer Status   Stand By Assist  Assistive Devices  Rails, Wheelchair  Oxygen  None - Room Air  Diet/Therapeutic Dining  Current Diet Order   Procedures    Diet Order Diet: Regular (T-dine, meats cut up)     Pill Administration  whole  Agitated Behavioral Scale  17  ABS Level of Severity  No Agitation    Fall Risk  Has the patient had a fall this admission?      Genesis Elizondo Fall Risk Scoring  22, HIGH RISK  Fall Risk Safety Measures  bed alarm, chair alarm, and seatbelt alarm    Vitals  Temperature: 36.7 °C (98 °F)  Temp src: Temporal  Pulse: 63  Respiration: 18  Blood Pressure : (!) 151/74  Blood Pressure MAP (Calculated): 100 MM HG  BP Location: Right, Upper Arm  Patient BP Position: Davis's Position     Oxygen  Pulse Oximetry: 98 %  O2 (LPM): 0  O2 Delivery Device: None - Room Air    Bowel and Bladder  Last Bowel Movement  03/27/25  Stool Type  Type 6: Fluffy pieces with ragged edges, a mushy stool  Bowel Device  Bathroom  Continent  Bladder: Did not void   Bowel: No movement  Bladder Function  Urine Void (mL): 500 ml  Number of Times Voided: 1  Urine Color: Yellow  Number of Times Incontinent of Urine: 0  Genitourinary Assessment   Bladder Assessment (WDL):  WDL Except  Urine Color: Yellow  Number of Bladder Accidents: 0  Total Number of Bladder of Accidents in Last 7 Days: 0  Number of Times Incontinent  of Urine: 0  Bladder Device: Bathroom, Diaper, Urinal  Time Void: Yes  Bladder Scan: Post Void  $ Bladder Scan Results (mL): 34    Skin  Dereje Score   19  Sensory Interventions   Bed Types: Standard/Trauma Mattress  Skin Preventative Measures: Pillows in Use for Support / Positioning  Moisture Interventions         Pain  Pain Rating Scale  3 - Sometimes distracts me  Pain Location  Head  Pain Location Orientation  Inner  Pain Interventions   Medication (see MAR)    ADLs    Bathing   Shower, * * With Assistance from, Family / Significant Other  Linen Change   Partial  Personal Hygiene     Chlorhexidine Bath      Oral Care  Brushed Teeth  Teeth/Dentures     Shave  Full Assist (spouse)  Nutrition Percentage Eaten  *  * Meal *  *, Lunch, Between % Consumed  Environmental Precautions  Treaded Slipper Socks on Patient, Personal Belongings, Wastebasket, Call Bell etc. in Easy Reach, Bed in Low Position  Patient Turns/Positioning  Sitting up in wheelchair  Patient Turns Assistance/Tolerance  Standby Assist  Bed Positions  Bed Controls On  Head of Bed Elevated  Self regulated      Psychosocial/Neurologic Assessment  Psychosocial Assessment  Psychosocial (WDL):  WDL Except  Patient Behaviors: Forgetful  Family Behaviors: No Family Present  Neurologic Assessment  Neuro (WDL): Exceptions to WDL  Level of Consciousness: Alert  Orientation Level: Disoriented to time  Cognition: Follows commands, Poor attention/concentration, Appropriate safety awareness  Speech: Clear  Pupil Assesment: No  Motor Function/Sensation Assessment: Motor strength  Muscle Strength Right Arm: Good Strength Against Gravity and Moderate Resistance  Muscle Strength Left Arm: Good Strength Against Gravity and Moderate Resistance  Muscle Strength Right Leg: Good Strength Against Gravity and Moderate Resistance  Muscle Strength Left Leg: Good Strength Against Gravity and Moderate Resistance  EENT (WDL):  WDL Except    Cardio/Pulmonary Assessment  Edema       Respiratory Breath Sounds  RUL Breath Sounds: Clear  RML Breath Sounds: Clear  RLL Breath Sounds: Diminished  ZACH Breath Sounds: Clear  LLL Breath Sounds: Diminished  Cardiac Assessment   Cardiac (WDL):  Within Defined Limits

## 2025-04-01 ENCOUNTER — APPOINTMENT (OUTPATIENT)
Dept: PHYSICAL THERAPY | Facility: REHABILITATION | Age: 75
DRG: 057 | End: 2025-04-01
Attending: PHYSICAL MEDICINE & REHABILITATION
Payer: COMMERCIAL

## 2025-04-01 ENCOUNTER — APPOINTMENT (OUTPATIENT)
Dept: OCCUPATIONAL THERAPY | Facility: REHABILITATION | Age: 75
DRG: 057 | End: 2025-04-01
Attending: PHYSICAL MEDICINE & REHABILITATION
Payer: COMMERCIAL

## 2025-04-01 ENCOUNTER — APPOINTMENT (OUTPATIENT)
Dept: SPEECH THERAPY | Facility: REHABILITATION | Age: 75
DRG: 057 | End: 2025-04-01
Attending: PHYSICAL MEDICINE & REHABILITATION
Payer: COMMERCIAL

## 2025-04-01 ENCOUNTER — HOSPITAL ENCOUNTER (OUTPATIENT)
Dept: RADIOLOGY | Facility: MEDICAL CENTER | Age: 75
End: 2025-04-01
Attending: PHYSICAL MEDICINE & REHABILITATION
Payer: COMMERCIAL

## 2025-04-01 ENCOUNTER — APPOINTMENT (OUTPATIENT)
Dept: INPATIENT REHAB | Facility: REHABILITATION | Age: 75
DRG: 057 | End: 2025-04-01
Attending: PHYSICAL MEDICINE & REHABILITATION
Payer: COMMERCIAL

## 2025-04-01 LAB
ALBUMIN SERPL BCP-MCNC: 3.5 G/DL (ref 3.2–4.9)
ALBUMIN/GLOB SERPL: 1.3 G/DL
ALP SERPL-CCNC: 103 U/L (ref 30–99)
ALT SERPL-CCNC: 48 U/L (ref 2–50)
ANION GAP SERPL CALC-SCNC: 10 MMOL/L (ref 7–16)
AST SERPL-CCNC: 24 U/L (ref 12–45)
BILIRUB SERPL-MCNC: 0.5 MG/DL (ref 0.1–1.5)
BUN SERPL-MCNC: 9 MG/DL (ref 8–22)
CALCIUM ALBUM COR SERPL-MCNC: 8.9 MG/DL (ref 8.5–10.5)
CALCIUM SERPL-MCNC: 8.5 MG/DL (ref 8.5–10.5)
CHLORIDE SERPL-SCNC: 104 MMOL/L (ref 96–112)
CO2 SERPL-SCNC: 21 MMOL/L (ref 20–33)
CREAT SERPL-MCNC: 0.71 MG/DL (ref 0.5–1.4)
ERYTHROCYTE [DISTWIDTH] IN BLOOD BY AUTOMATED COUNT: 44.2 FL (ref 35.9–50)
GFR SERPLBLD CREATININE-BSD FMLA CKD-EPI: 95 ML/MIN/1.73 M 2
GLOBULIN SER CALC-MCNC: 2.6 G/DL (ref 1.9–3.5)
GLUCOSE SERPL-MCNC: 99 MG/DL (ref 65–99)
HCT VFR BLD AUTO: 43.2 % (ref 42–52)
HGB BLD-MCNC: 14.6 G/DL (ref 14–18)
MCH RBC QN AUTO: 30.8 PG (ref 27–33)
MCHC RBC AUTO-ENTMCNC: 33.8 G/DL (ref 32.3–36.5)
MCV RBC AUTO: 91.1 FL (ref 81.4–97.8)
PLATELET # BLD AUTO: 246 K/UL (ref 164–446)
PMV BLD AUTO: 9.6 FL (ref 9–12.9)
POTASSIUM SERPL-SCNC: 4.4 MMOL/L (ref 3.6–5.5)
PROT SERPL-MCNC: 6.1 G/DL (ref 6–8.2)
RBC # BLD AUTO: 4.74 M/UL (ref 4.7–6.1)
SODIUM SERPL-SCNC: 135 MMOL/L (ref 135–145)
WBC # BLD AUTO: 8.8 K/UL (ref 4.8–10.8)

## 2025-04-01 PROCEDURE — 92507 TX SP LANG VOICE COMM INDIV: CPT

## 2025-04-01 PROCEDURE — 97530 THERAPEUTIC ACTIVITIES: CPT

## 2025-04-01 PROCEDURE — 97129 THER IVNTJ 1ST 15 MIN: CPT

## 2025-04-01 PROCEDURE — 97110 THERAPEUTIC EXERCISES: CPT

## 2025-04-01 PROCEDURE — A9270 NON-COVERED ITEM OR SERVICE: HCPCS | Performed by: STUDENT IN AN ORGANIZED HEALTH CARE EDUCATION/TRAINING PROGRAM

## 2025-04-01 PROCEDURE — A9270 NON-COVERED ITEM OR SERVICE: HCPCS | Performed by: PHYSICAL MEDICINE & REHABILITATION

## 2025-04-01 PROCEDURE — 700102 HCHG RX REV CODE 250 W/ 637 OVERRIDE(OP): Performed by: STUDENT IN AN ORGANIZED HEALTH CARE EDUCATION/TRAINING PROGRAM

## 2025-04-01 PROCEDURE — 700102 HCHG RX REV CODE 250 W/ 637 OVERRIDE(OP): Performed by: PHYSICAL MEDICINE & REHABILITATION

## 2025-04-01 PROCEDURE — 85027 COMPLETE CBC AUTOMATED: CPT

## 2025-04-01 PROCEDURE — 80053 COMPREHEN METABOLIC PANEL: CPT

## 2025-04-01 PROCEDURE — 70450 CT HEAD/BRAIN W/O DYE: CPT

## 2025-04-01 PROCEDURE — 36415 COLL VENOUS BLD VENIPUNCTURE: CPT

## 2025-04-01 PROCEDURE — 770010 HCHG ROOM/CARE - REHAB SEMI PRIVAT*

## 2025-04-01 PROCEDURE — 99233 SBSQ HOSP IP/OBS HIGH 50: CPT | Performed by: PHYSICAL MEDICINE & REHABILITATION

## 2025-04-01 RX ADMIN — SENNOSIDES AND DOCUSATE SODIUM 2 TABLET: 50; 8.6 TABLET ORAL at 20:06

## 2025-04-01 RX ADMIN — ACETAMINOPHEN 1000 MG: 500 TABLET ORAL at 16:56

## 2025-04-01 RX ADMIN — LEVETIRACETAM 500 MG: 500 TABLET, FILM COATED ORAL at 08:43

## 2025-04-01 RX ADMIN — DONEPEZIL HYDROCHLORIDE 5 MG: 5 TABLET, FILM COATED ORAL at 08:43

## 2025-04-01 RX ADMIN — LEVETIRACETAM 500 MG: 500 TABLET, FILM COATED ORAL at 20:06

## 2025-04-01 RX ADMIN — OMEPRAZOLE 20 MG: 20 CAPSULE, DELAYED RELEASE ORAL at 08:43

## 2025-04-01 ASSESSMENT — BRIEF INTERVIEW FOR MENTAL STATUS (BIMS)
BIMS SUMMARY SCORE: 8
ASKED TO RECALL BED: NO, COULD NOT RECALL
WHAT MONTH IS IT: MISSED BY MORE THAN 1 MONTH
COGNITIVE PATTERN ASSESSMENT USED: BIMS
INITIAL REPETITION OF BED BLUE SOCK - FIRST ATTEMPT: 3
ASKED TO RECALL BLUE: NO, COULD NOT RECALL
WHAT DAY OF THE WEEK IS IT: INCORRECT
ASKED TO RECALL SOCK: YES, NO CUE REQUIRED
WHAT YEAR IS IT: CORRECT

## 2025-04-01 ASSESSMENT — PAIN DESCRIPTION - PAIN TYPE
TYPE: ACUTE PAIN
TYPE: ACUTE PAIN

## 2025-04-01 NOTE — PROGRESS NOTES
NURSING DAILY NOTE    Name: Lico Caba   Date of Admission: 3/21/2025   Admitting Diagnosis: Intracranial hemorrhage, spontaneous intraparenchymal, idiopathic, acute ([facility])  Attending Physician: SAVITA GOODMAN M.D.  Allergies: Patient has no known allergies.    Safety  Patient Assist  min  Patient Precautions  Precautions: Fall Risk, Cognition/Communication  Fall Risk: Poor balance, Low vision, Alarming seat belt, Visual spatial neglect/Neglect program  Cognition/Communication: Receptive Aphasia, Expressive Aphasia  Swallow: Therapeutic dining  Comments: R visual field cut  Bed Transfer Status  Supervised  Toilet Transfer Status   Stand By Assist  Assistive Devices  Rails, Wheelchair  Oxygen  None - Room Air  Diet/Therapeutic Dining  Current Diet Order   Procedures    Diet Order Diet: Regular (T-dine, meats cut up)     Pill Administration  whole  Agitated Behavioral Scale  17  ABS Level of Severity  No Agitation    Fall Risk  Has the patient had a fall this admission?      Genesis Elizondo Fall Risk Scoring  22, HIGH RISK  Fall Risk Safety Measures  bed alarm, chair alarm, and seatbelt alarm    Vitals  Temperature: 37.2 °C (98.9 °F)  Temp src: Oral  Pulse: 71  Respiration: 18  Blood Pressure : 122/67  Blood Pressure MAP (Calculated): 85 MM HG  BP Location: Left, Upper Arm  Patient BP Position: Sitting     Oxygen  Pulse Oximetry: 96 %  O2 (LPM): 0  O2 Delivery Device: None - Room Air    Bowel and Bladder  Last Bowel Movement  03/31/25 (per wife)  Stool Type  Type 6: Fluffy pieces with ragged edges, a mushy stool  Bowel Device  Bathroom  Continent  Bladder: Did not void   Bowel: No movement  Bladder Function  Urine Void (mL): 500 ml  Number of Times Voided: 1  Urine Color: Yellow  Number of Times Incontinent of Urine: 0  Genitourinary Assessment   Bladder Assessment (WDL):  WDL Except  Urine Color: Yellow  Number of Bladder Accidents: 0  Total Number of Bladder of Accidents in Last 7 Days: 0  Number of  Times Incontinent of Urine: 0  Bladder Device: Bathroom, Diaper, Urinal  Time Void: Yes  Bladder Scan: Post Void  $ Bladder Scan Results (mL): 34    Skin  Dereje Score   19  Sensory Interventions   Bed Types: Standard/Trauma Mattress  Skin Preventative Measures: Pillows in Use for Support / Positioning  Moisture Interventions         Pain  Pain Rating Scale  3 - Sometimes distracts me  Pain Location  Head  Pain Location Orientation  Anterior  Pain Interventions   Medication (see MAR)    ADLs    Bathing   Shower, * * With Assistance from, Family / Significant Other  Linen Change   Partial  Personal Hygiene     Chlorhexidine Bath      Oral Care  Brushed Teeth  Teeth/Dentures     Shave  Full Assist (spouse)  Nutrition Percentage Eaten  Dinner, Between % Consumed  Environmental Precautions  Treaded Slipper Socks on Patient, Personal Belongings, Wastebasket, Call Bell etc. in Easy Reach, Bed in Low Position  Patient Turns/Positioning  Sitting up in wheelchair  Patient Turns Assistance/Tolerance  Standby Assist  Bed Positions  Bed Controls On  Head of Bed Elevated  Self regulated      Psychosocial/Neurologic Assessment  Psychosocial Assessment  Psychosocial (WDL):  WDL Except  Patient Behaviors: Forgetful  Family Behaviors: No Family Present  Neurologic Assessment  Neuro (WDL): Exceptions to WDL  Level of Consciousness: Alert  Orientation Level: Disoriented to time  Cognition: Follows commands, Poor attention/concentration, Appropriate safety awareness  Speech: Clear  Pupil Assesment: No  Motor Function/Sensation Assessment: Motor strength  Muscle Strength Right Arm: Good Strength Against Gravity and Moderate Resistance  Muscle Strength Left Arm: Good Strength Against Gravity and Moderate Resistance  Muscle Strength Right Leg: Good Strength Against Gravity and Moderate Resistance  Muscle Strength Left Leg: Good Strength Against Gravity and Moderate Resistance  EENT (WDL):  WDL Except    Cardio/Pulmonary  Assessment  Edema      Respiratory Breath Sounds  RUL Breath Sounds: Clear  RML Breath Sounds: Clear  RLL Breath Sounds: Diminished  ZACH Breath Sounds: Clear  LLL Breath Sounds: Diminished  Cardiac Assessment   Cardiac (WDL):  Within Defined Limits

## 2025-04-01 NOTE — THERAPY
Physical Therapy   Daily Treatment     Patient Name:  Lico Caba  Age:  74 y.o., Sex:  male  Medical Record #:  [id number]  Today's Date: 4/1/2025     Precautions  Precautions: Fall Risk, Cognition/Communication  Fall Risk: Poor balance  Cognition/Communication: Expressive Aphasia  Swallow: Therapeutic dining  Comments: R visual field cut    Subjective: patient agreeable to practice HEP    Objective:    04/01/25 0931   PT Charge Group   PT Therapeutic Exercise (Units) 2   PT Total Time Spent   PT Individual Total Time Spent (Mins) 30   Precautions   Precautions Fall Risk;Cognition/Communication   Fall Risk Poor balance   Cognition/Communication Expressive Aphasia   Swallow Therapeutic dining   Comments R visual field cut   Ambulation   Level of Assist Stand By Assist   Assistive Device No AD   Additional Description Cueing needed   Distance 100         Therapeutic Exercise  Purpose: to improve strength and transition to HEP  Interventions:   Lower body exercises:   Seated program -   Ankle pumps, 1 set of 10 and Bilateral  Hip abduction, 1 set of 10 and Bilateral  Hip adduction, 1 set of 10 and Bilateral  Long arc quad, 1 set of 10, Bilateral, and Right  Marching, Reciprocal and 1 set of 10  Hamstring curl, 1 set of 10 and Bilateral  Standing program -   Hamstring curl, 1 set of 10 and Bilateral  Hip extension, 1 set of 10 and Bilateral  Hip abduction, 1 set of 10 and Bilateral  Marching, Reciprocal and 1 set of 10  Heel Rise, 1 set of 10 and Bilateral  Mini squat, Partial and 1 set of 10    Assessment: able to demonstrate seated and standing HEP with hand-outs    Strengths: Adequate strength, Independent prior level of function, Making steady progress towards goals, Motivated for self care and independence, Pleasant and cooperative, Supportive family, Willingly participates in therapeutic activities  Barriers: Aphasia expressive, Aphasia receptive, Hearing impairment, Impaired balance, Impaired  insight/denial of deficits, Impaired functional cognition, Visual impairment    Plan:   FGA, floor transfer, ambulation outdoors  Dual task and problem solving with mobility (path finding)  R sided visual scanning/compensatory strategies      DME     PT DME Recommendations  Assistive Device:  (none anticipated)        Passport items to be completed:  Get in/out of bed safely, in/out of a vehicle, safely use mobility device, walk or wheel around home/community, navigate up and down stairs, show how to get up/down from the ground, ensure home is accessible, demonstrate HEP, complete caregiver trainin    Physical Therapy Problems (Active)       Problem: PT-Long Term Goals       Dates: Start:  03/22/25         Goal: LTG-By discharge, patient will ambulate 500ft SPV no device       Dates: Start:  03/22/25            Goal: LTG-By discharge, patient will transfer one surface to another independently       Dates: Start:  03/22/25            Goal: LTG-By discharge, patient will participate in community outing with minimal verbal cueing to scan right in novel environment and no physical assistance required for mobility       Dates: Start:  03/22/25

## 2025-04-01 NOTE — THERAPY
Recreational Therapy  Daily Treatment     Patient Name: Lico Caba  AGE:  74 y.o., SEX:  male  Medical Record #: [id number]  Today's Date: 4/1/2025     Subjective: Patient willing to participate in session.     Objective:    04/01/25 1031   Treatment Time   Total Time Spent (mins) 20   Total Time Missed 10   Reasons for Time Missed Non-Medical-Other (Please Comment)  (Patient with charge nurse)   Precautions   Precautions Fall Risk;Cognition/Communication   Fall Risk Poor balance   Cognition/Communication Expressive Aphasia   Swallow Therapeutic dining   Comments R visual field cut   Patient Presentation   Attention Span Remains on Task   Affect Appropriate   Behavior Appropriate   Participation Level   Participation Level Full   Strengths & Barriers   Strengths Able to follow instructions;Independent prior level of function;Pleasant and cooperative;Supportive family;Willingly participates in therapeutic activities;Motivated for self care and independence   Barriers Aphasia expressive   Interdisciplinary Plan of Care Collaboration   Patient Position at End of Therapy Seated;Chair Alarm On         Cognitive Skills  Purpose: to improve word finding skills and visual scanning   Interventions:   Board game activity; I spy activity   Patient was asked to find items on an I Spy board. With moderate prompts and hints was able to find most of the items. Had more difficulty when item was in the top right corner.     Assessment    Patient continues to need moderate cues to find things in his right visual field. Was also having some difficulty finding numbers, even on his left.     Strengths: (P) Able to follow instructions, Independent prior level of function, Pleasant and cooperative, Supportive family, Willingly participates in therapeutic activities, Motivated for self care and independence  Barriers: (P) Aphasia expressive    Plan  Patient is expected to discharge in two days.     Passport items to be  completed:  Discuss returning to work, hobbies, community groups or volunteer activities, explore community resources

## 2025-04-01 NOTE — THERAPY
Speech Language Pathology  Daily Treatment     Patient Name:  Lico Caba  Age:  74 y.o., Sex:  male  Medical Record #:  [id number]  Today's Date: 4/1/2025    Precautions  Precautions: Fall Risk, Cognition/Communication  Fall Risk: Poor balance  Cognition/Communication: Expressive Aphasia  Swallow: Therapeutic dining  Comments: R visual field cut    Subjective: The pt was found seated in his room and was agreeable to ST session.     Objective:    04/01/25 1403   SLP Charge Group   SLP Treatment - Individual Speech Language Treatment - Individual   SLP Total Time Spent   SLP Individual Total Time Spent (Mins) 60   Precautions   Precautions Fall Risk;Cognition/Communication   Fall Risk Poor balance   Cognition/Communication Expressive Aphasia   Comments R visual field cut   Impairment Assessments   Impairment Assessments Comprehension;Expression;Social Interaction;Problem Solving;Memory;Dysphagia   Comprehension Level of Assist Moderate Assist;Maximal Assist   Comprehension Description Cueing needed;Extra time needed   Expression Level of Assist Moderate Assist   Expression Description Set up of equipment;Extra time needed;Cueing needed   Social Interaction Level of Assist Modified Independent   Social Interaction Description Extra time needed   Problem Solving Level of Assist Maximal Assist   Problem Solving Description Cueing needed;Extra time needed   WAB - Bedside (Western Aphasia Battery)   Spontaneous Speech: Content  9   Spontaneous Speech: Fluency 5   Auditory Comprehension 8   Sequential Commands 2   Repetition Score  8   Object Naming 6   Reading 0   Writing 0   Apraxia  2   Bedside Asphasia Score 63.33   Bedside Language Score 47.5   Interdisciplinary Plan of Care Collaboration   Patient Position at End of Therapy Seated;Chair Alarm On;Self Releasing Lap Belt Applied;Call Light within Reach;Tray Table within Reach         Receptive Language/Auditory Comprehension  Purpose: to improve  "comprehension of spoken/written language, to improve comprehension of instructions, and to improve overall ability to communicate  Interventions:   Identifies pictures  Follows one unit verbal commands  Understands simple/structured conversation    Expressive Language  Purpose: to improve confidence in verbal expression across different environments (i.e. home, work, school, social settings, etc.), to improve the ability to verbally express thoughts, needs, emotions, and ideas in everyday communication, and to improve the use of appropriate vocabulary, sentence structure, and grammar  Interventions:   Naming  Automatic language  Responding to questions  Expressive language deficits characterized by: Perseverations, Word Finding Deficits (Anomia), and Phonemic Paraphasias     Written Language Expression  Purpose: to develop strategies to overcome word finding and spelling difficulties  Interventions:   Copying  Spontaneous word level  Barriers to Written Language Expression: Spelling accuracy and Legibility    Administration of the Bedside WAB was completed with the pt demonstrating a slight improvement in comparison to his intake assessment (60 to 63.33).    The pt completed picture naming achieving 0/3 independently.    He independently transcribed 1/3 (33%) independently in picture naming task.     Assessment:     The pt demonstrated a slight increase on his outcome assessment (Bedside WAB). He exhibited increased receptive language difficulties (clinician modeling) to follow one-step directions. The pt required mod A for picture naming and benefited from sentence completion cues to produce target response. For transcription of target words the pt required overall min-mod A (first letter cues) but independently spelled \"leg\" correctly.  Notably, the pt exhibited strengths in circumlocutionary speech when unable to produce target word (\"There's an umpire and pitcher\" target word-baseball).     Strengths: Able to " follow instructions, Pleasant and cooperative, Supportive family, Willingly participates in therapeutic activities, Motivated for self care and independence  Barriers: Aphasia expressive, Aphasia receptive, Impaired functional cognition, Visual impairment    Plan:    Outcome assessment (SCCAN)   Picture naming with spelling using letter tiles  SFA    Speech Therapy Problems (Active)       Problem: Expression STGs       Dates: Start:  03/22/25         Goal: STG-Within one week, patient will       Dates: Start:  03/22/25       Description: 1) Individualized goal:  describe selected vocabulary by semantic features with 80% acc and MIN A.   2) Interventions:  SLP Speech Language Treatment, SLP Self Care / ADL Training , and SLP Group Treatment          Goal Note filed on 03/31/25 1553 by Areli Kerr, Student       Pt requires mod-max A to complete SFA tasks.                 Problem: Memory STGs       Dates: Start:  03/22/25         Goal: STG-Within one week, patient will       Dates: Start:  03/22/25       Description: 1) Individualized goal:  complete working memory tasks w/ 80% accuracy and MIN A.   2) Interventions:  SLP Self Care / ADL Training , SLP Cognitive Skill Development, and SLP Group Treatment          Goal Note filed on 03/31/25 1553 by Areli Kerr, Student       Goal not targeted. Initiate working memory tasks.                  Problem: Social Interaction STGs       Dates: Start:  03/22/25         Goal: STG-Within one week, patient will       Dates: Start:  03/22/25               Problem: Speech/Swallowing LTGs       Dates: Start:  03/22/25         Goal: LTG-By discharge, patient will safely swallow       Dates: Start:  03/22/25       Description: 1) Individualized goal:  regular textures and thin liquids implementing safe swallow strategies in >90% of opportunities with MOD I for a safe D/C to PLOF.   2) Interventions:  SLP Swallowing Dysfunction Treatment, SLP Self Care / ADL Training ,  and SLP Group Treatment             Goal: LTG-By discharge, patient will solve basic problems       Dates: Start:  03/22/25       Description: 1) Individualized goal:  w/ 80% acc and MOD I for a safe D/C to PLOF.   2) Interventions:  SLP Speech Language Treatment, SLP Self Care / ADL Training , SLP Cognitive Skill Development, and SLP Group Treatment                Problem: Swallowing STGs       Dates: Start:  03/22/25         Goal: STG-Within one week, patient will       Dates: Start:  03/22/25

## 2025-04-01 NOTE — THERAPY
Occupational Therapy  Daily Treatment     Patient Name:  Lico Caba  Age:  74 y.o., Sex:  male  Medical Record #:  [id number]  Today's Date:  4/1/2025    Precautions  Precautions: Fall Risk, Cognition/Communication  Fall Risk: Poor balance  Cognition/Communication: Expressive Aphasia  Swallow: Therapeutic dining  Comments: R visual field cut    Subjective: Pt seated in w/c upon arrival, pleasant and cooperative, agreeable to therapy       Objective:    04/01/25 1501   OT Charge Group   OT Therapy Activity (Units) 2   OT Total Time Spent   OT Individual Total Time Spent (Mins) 30   Interdisciplinary Plan of Care Collaboration   Patient Position at End of Therapy Call Light within Reach;Tray Table within Reach;In Bed;Bed Alarm On     Therapeutic Activities  Purpose: to improve performance and function of daily activities  Interventions:  Cognitive activity for FMC, visual memory/scanning, problem solving using 3-10 tile pieces:  Matching tiles, memorizing tile sequence/shapes and recreating, copy tile design    Assessment:    Session focused on edu pt re: breaking down tasks in order to assist with attention and problem solving, especially if too visually overstimulating    Strengths: Independent prior level of function, Making steady progress towards goals, Motivated for self care and independence, Pleasant and cooperative, Supportive family, Willingly participates in therapeutic activities  Barriers: Aphasia expressive, Aphasia receptive, Confused, Decreased endurance, Difficulty following instructions, Fatigue, Generalized weakness, Impaired activity tolerance, Impaired balance, Impaired carryover of learning, Impaired insight/denial of deficits, Impaired functional cognition, Impulsive, Limited mobility, Visual impairment    Plan:  R visual scanning and safety awareness, RUE neuro re-ed (ataxia), functional cognition (memory, aphasia, apraxia, attention, problem solving), overall strength/endurance  and standing balance     Spouse cleared to ambulate with pt in hallways and cleared to assist with showers without AD     Lives with spouse, 1 story, 1 CAROL ANN, WIS, tub. Recommend shower chair, GB's    Occupational Therapy Goals (Active)       Problem: OT Long Term Goals       Dates: Start:  03/22/25         Goal: LTG-By discharge, patient will complete basic self care tasks at SPV-Mod I level.       Dates: Start:  03/22/25            Goal: LTG-By discharge, patient will perform bathroom transfers at SPV-Mod I level.       Dates: Start:  03/22/25

## 2025-04-01 NOTE — CARE PLAN
Patient was able to successfully play a board game that required his to scan right to be successful but did need moderate assistance.

## 2025-04-01 NOTE — THERAPY
"Speech Language Pathology  Daily Treatment     Patient Name:  Lico Caba  Age:  74 y.o., Sex:  male  Medical Record #:  [id number]  Today's Date: 4/1/2025    Precautions  Precautions: Fall Risk, Cognition/Communication  Fall Risk: Poor balance  Cognition/Communication: Expressive Aphasia  Swallow: Therapeutic dining  Comments: R visual field cut    Subjective: Patient agreeable to therapy.  He recalled this therapists name.  Patient reported that heassociates \"Alyse Fierro\"     Objective:    04/01/25 1101   SLP Charge Group   SLP Treatment - Individual Speech Language Treatment - Individual   SLP Total Time Spent   SLP Individual Total Time Spent (Mins) 30         Expressive Language  Purpose: to improve the ability to verbally express thoughts, needs, emotions, and ideas in everyday communication and to utilize compensatory strategies to effectively communicate  Interventions:   Naming  Expressive language deficits characterized by: Perseverations and Semantic Paraphasias     Written Language Expression  Purpose: to improve the ability to express information through written communication and to develop strategies to overcome word finding and spelling difficulties  Interventions:   Dominant hand: Right  Spontaneous word level  Barriers to Written Language Expression: Spelling accuracy, Impaired visual scanning/processing, and Visual/perceptual deficit    Assessment:     Patient named 9/10 1 syllable pictures with phrase completion cue needed for one item.  Patient also worked to spell the names of these pictures with letter tiles for the word provided ( but scrambled).  Patient verbalized the correct spelling 7/10.  However struggled to spell the target word with visual inattention a factor.  Asked patient to focus on the first letter with this cuing and overall moderate cuing to check spelling when in error, he was able to produce the target spelling.    Strengths: Able to follow instructions, Pleasant " and cooperative, Supportive family, Willingly participates in therapeutic activities, Motivated for self care and independence  Barriers: Aphasia expressive, Aphasia receptive, Impaired functional cognition, Visual impairment    Plan:  Target naming, spelling at word level.    Passport items to be completed:  Express basic needs, understand food/liquid recommendations, consistently follow swallow precautions, manage finances, manage medications, arrive to therapy appointments on time, complete daily memory log entries, solve problems related to safety situations, review education related to hospitalization, complete caregiver training     Speech Therapy Problems (Active)       Problem: Expression STGs       Dates: Start:  03/22/25         Goal: STG-Within one week, patient will       Dates: Start:  03/22/25       Description: 1) Individualized goal:  describe selected vocabulary by semantic features with 80% acc and MIN A.   2) Interventions:  SLP Speech Language Treatment, SLP Self Care / ADL Training , and SLP Group Treatment          Goal Note filed on 03/31/25 1553 by Areli Kerr, Student       Pt requires mod-max A to complete SFA tasks.                 Problem: Memory STGs       Dates: Start:  03/22/25         Goal: STG-Within one week, patient will       Dates: Start:  03/22/25       Description: 1) Individualized goal:  complete working memory tasks w/ 80% accuracy and MIN A.   2) Interventions:  SLP Self Care / ADL Training , SLP Cognitive Skill Development, and SLP Group Treatment          Goal Note filed on 03/31/25 1553 by Areli Kerr, Student       Goal not targeted. Initiate working memory tasks.                  Problem: Social Interaction STGs       Dates: Start:  03/22/25         Goal: STG-Within one week, patient will       Dates: Start:  03/22/25               Problem: Speech/Swallowing LTGs       Dates: Start:  03/22/25         Goal: LTG-By discharge, patient will safely swallow        Dates: Start:  03/22/25       Description: 1) Individualized goal:  regular textures and thin liquids implementing safe swallow strategies in >90% of opportunities with MOD I for a safe D/C to PLOF.   2) Interventions:  SLP Swallowing Dysfunction Treatment, SLP Self Care / ADL Training , and SLP Group Treatment             Goal: LTG-By discharge, patient will solve basic problems       Dates: Start:  03/22/25       Description: 1) Individualized goal:  w/ 80% acc and MOD I for a safe D/C to PLOF.   2) Interventions:  SLP Speech Language Treatment, SLP Self Care / ADL Training , SLP Cognitive Skill Development, and SLP Group Treatment                Problem: Swallowing STGs       Dates: Start:  03/22/25         Goal: STG-Within one week, patient will       Dates: Start:  03/22/25

## 2025-04-01 NOTE — PROGRESS NOTES
Physical Medicine & Rehabilitation Progress Note  _____________________________________  Interdisciplinary Team Conference   Most recent IDT on 4/1/2025    IVivek M.D., was present and led the interdisciplinary team conference on 4/1/2025.  I led the IDT conference and agree with the IDT conference documentation and plan of care as noted below.       Nursing:  Diet Current Diet Order   Procedures    Diet Order Diet: Regular (T-dine, meats cut up)       Eating ADL Set Up, Supervised, Moderate Assist     % of Last Meal  Oral Nutrition: Lunch, Between 50-75% Consumed   Sleep    Bowel Last BM: 03/31/25   Bladder    Barriers to Discharge Home:   weakness      Physical Therapy:  Bed Mobility Roll Left and Right: Independent  Sit to Lying: Independent  Lying to Sitting: Independent   Transfers Sit to Stand: Independent  Chair/Bed to Chair: Supervised  Toilet: Stand By Assist  Car: Minimal Assist   Mobility Ambulation: Stand By Assist  Device: No AD  Ambulation Distance: 700'  Wheelchair: Supervised  Type: Manual  Wheelchair Distance: 150   Stairs/Curbs Stairs: Stand By Assist  Stairs Amount: 6  Curbs: Contact Guard Assist   Barriers to Discharge Home:   weakness      Occupational Therapy:  Oral Hygiene Minimal Assist   Grooming Supervised   Shower/Bathing Stand By Assist   UB Dressing Stand by Assist   LB Dressing Stand by Assist  Stand by Assist   Toileting Transfer: Stand By Assist  Hygiene: Minimal Assist (with spouse assist)   Shower & Transfer Contact Guard Assist     Barriers to Discharge Home:   weakness      Speech-Language Pathology:  Comprehension Level of Assist:  Minimal Assist  Comprehension Description:  Cueing needed, Extra time needed  Expression Level of Assist:  Moderate Assist  Expression Description:  Set up of equipment, Extra time needed, Cueing needed  Social Interaction Level of Assist:  Modified Independent  Social Interaction Description:  Extra time needed  Problem Solving Level of  "Assist:  Maximal Assist  Problem Solving Description:  Cueing needed, Extra time needed  Memory Level of Assist:  Moderate Assist  Memory Description:  Cueing needed, Extra time needed  Barriers to Discharge Home:   aphasia    Respiratory Therapy:  O2 (LPM): 0  O2 Delivery Device: None - Room Air    Case Management:  Continues to work on disposition and DME needs.      Discharge Date/Disposition:  4/3/25  _____________________________________   Encounter Date: 4/1/2025    Chief Complaint: Weakness    Interval Events (Subjective):  Seen in chair, tolerating therapy, ready to dc 4/3. Family training going well. No concerns    Objective:  VITAL SIGNS: /77   Pulse 91   Temp 36.6 °C (97.9 °F) (Tympanic)   Resp 18   Ht 1.854 m (6' 1\")   Wt 80 kg (176 lb 5.9 oz)   SpO2 97%   BMI 23.27 kg/m²   Gen: No acute distress, well developed well nourished adult  HEENT: Normal Cephalic Atraumatic, Normal conjunctiva.   CV: warm extremities, well perfused, no edema  Resp: symmetric chest rise, breathing comfortably on room air  Abd: Soft, Non distended  Extremities: normal bulk, no atrophy  Skin: no visible rashes or lesions.   Neuro: alert, awake  Psych: Mood and affect appropriate and congruent    Laboratory Values:  Recent Results (from the past 72 hours)   CBC WITHOUT DIFFERENTIAL    Collection Time: 04/01/25  5:54 AM   Result Value Ref Range    WBC 8.8 4.8 - 10.8 K/uL    RBC 4.74 4.70 - 6.10 M/uL    Hemoglobin 14.6 14.0 - 18.0 g/dL    Hematocrit 43.2 42.0 - 52.0 %    MCV 91.1 81.4 - 97.8 fL    MCH 30.8 27.0 - 33.0 pg    MCHC 33.8 32.3 - 36.5 g/dL    RDW 44.2 35.9 - 50.0 fL    Platelet Count 246 164 - 446 K/uL    MPV 9.6 9.0 - 12.9 fL   Comp Metabolic Panel    Collection Time: 04/01/25  5:54 AM   Result Value Ref Range    Sodium 135 135 - 145 mmol/L    Potassium 4.4 3.6 - 5.5 mmol/L    Chloride 104 96 - 112 mmol/L    Co2 21 20 - 33 mmol/L    Anion Gap 10.0 7.0 - 16.0    Glucose 99 65 - 99 mg/dL    Bun 9 8 - 22 mg/dL "    Creatinine 0.71 0.50 - 1.40 mg/dL    Calcium 8.5 8.5 - 10.5 mg/dL    Correct Calcium 8.9 8.5 - 10.5 mg/dL    AST(SGOT) 24 12 - 45 U/L    ALT(SGPT) 48 2 - 50 U/L    Alkaline Phosphatase 103 (H) 30 - 99 U/L    Total Bilirubin 0.5 0.1 - 1.5 mg/dL    Albumin 3.5 3.2 - 4.9 g/dL    Total Protein 6.1 6.0 - 8.2 g/dL    Globulin 2.6 1.9 - 3.5 g/dL    A-G Ratio 1.3 g/dL   ESTIMATED GFR    Collection Time: 04/01/25  5:54 AM   Result Value Ref Range    GFR (CKD-EPI) 95 >60 mL/min/1.73 m 2       Medications:  Scheduled Medications   Medication Dose Frequency    donepezil  5 mg DAILY    Pharmacy Consult Request  1 Each PHARMACY TO DOSE    senna-docusate  2 Tablet Q EVENING    omeprazole  20 mg DAILY    levETIRAcetam  500 mg BID     PRN medications: acetaminophen, Respiratory Therapy Consult, hydrALAZINE, senna-docusate **AND** polyethylene glycol/lytes, ondansetron **OR** ondansetron, traZODone, sodium chloride, butalbital/apap/caffeine, oxyCODONE immediate-release    Diet:  Current Diet Order   Procedures    Diet Order Diet: Regular (T-dine, meats cut up)       Medical Decision Making and Plan:  CVA:  Visual Agnosia  Right hemianopsia  Impaired attention  - Patient with L hemorraghic stroke side/type of stroke on 3/16 date. Etiology likely hypertensive. Received no intervention  -on keppra 500mg BID  -EKG today show no qtc  -routine ct head to monitor progression of IPH, stable progression     Bilateral lower extremity edema  -3/25 - dopplers to rule out blood clot  -continue ambulation as much a possible  -elevate legs in bed     Leukocytosis -resolved  -likely due to prednisone     Cognitive Communicative deficits:  - Patient with significant cognitive deficits due to aphasia  - Environmental modifications to decrease excessive stimulation including turning off the lights  - Consider starting neurostimulants such Nuvigil, amantadine or methylphenidate  - SLP to evaluate and treat cognitive deficits.   -Neuropsych will  follow and perform testing if/when appropriate.  3/31- start aricept        Hemiparesis:  - Patient with R dom hemiparesis  - consider starting SSRI per FLAME trial to facilitate post-stroke motor recovery  - Continue aggressive therapies with PT and OT to strengthen and optimize function.     Spasticity:  - Patient at risk of tone in the affected limbs/muscles  - PT and OT to initiate aggressive stretching, ROM exercises, bracing, splinting, casting and positioning as appropriate.  - If the tone fails to improve with the above conservative measures, consider further intervention with botulinum toxin or phenol injections.     Dysphagia/Nutrition:  - Patient currently on dysphagia DIET.  - Continue therapies with SLP. SLP to perform swallow evaluation and provide oral motor exercises if necessary.        Neurogenic bladder:  - Timed voids with PVR q4H x3. If PVR > 400mL or if patient is unable to void, straight cath patient.     Neurogenic bowel:  -  Colace, Senna BID on admission  - Goal of 1BM/day.  -Last BM: 03/20/25 (Per report)        Circadian Rhythm disorder:   -Trazadone 50mg PRN for restlessness after 10pm  Recommend lights on during the day/off at night, minimize nighttime interruptions as able.     Mood  - at risk of adjustment disorder, depression, and anxiety due to functional decline     ID:  - at risk for Urinary tract infection     Skin/Wounds:  - Pressure relief q2h while in bed. Close monitoring for signs of breakdown     Pain:  - Neuroceptic - On Tylenol prn, fiorecet, prednisone x 4 days completed. Continue tylenol and fiorecet     DVT prophylaxis:  continues to be contraindicated. Dopplers negative 3/27     GI prophylaxis:  On Prilosec 20mg daily         -Follow-up Neurology, PCP       ____________________________________    Vivek Klein MD  Physical Medicine & Rehabilitation   Brain Injury Medicine   ____________________________________    Total time:  60 minutes. Time spent included  pre-rounding, review of vitals and tests, unit/floor time, face-to-face time with the patient including physical examination, care coordination, counseling of patient and/or family, ordering medications/procedures/tests, discussion with CM, PT, OT, SLP and/or other healthcare providers, and documentation in the electronic medical record. Topics discussed included dispostion.     Procedure To Be Performed At Next Visit: ED&C X Size Of Lesion In Cm (Optional): 0 Detail Level: Detailed Introduction Text (Please End With A Colon): The following procedure was deferred:

## 2025-04-01 NOTE — CARE PLAN
"  Problem: Fall Risk - Rehab  Goal: Patient will remain free from falls  Outcome: Progressing   Genesis Elizondo Fall risk Assessment Score: 18    High fall risk Interventions   - Alarming seatbelt  - Bed and strip alarm   - Yellow sign by the door   - Yellow wrist band \"Fall risk\"  - Room near to the nurse station  - Do not leave patient unattended in the bathroom  - Fall risk education provided  - Call light within reach   - Yellow  socks   - Belongings within reach   - Bed in the lowest position        Problem: Pain - Standard  Goal: Alleviation of pain or a reduction in pain to the patient’s comfort goal  Outcome: Progressing   Patient denies pain or discomfort at this time. Will continue to monitor.     Problem: Knowledge Deficit - Standard  Goal: Patient and family/care givers will demonstrate understanding of plan of care, disease process/condition, diagnostic tests and medications  Outcome: Progressing       "

## 2025-04-01 NOTE — CARE PLAN
Problem: Fall Risk - Rehab  Goal: Patient will remain free from falls  Note: Patient uses call light consistently and appropriately. Patient waits for assistance and is able to verbalize needs this shift.       Problem: Bladder / Voiding  Goal: Patient will establish and maintain regular urinary output  Outcome: Progressing  Note: Patient is continent of bladder this shift.  Will continue to monitor.   The patient is Stable - Low risk of patient condition declining or worsening    Shift Goals  Clinical Goals: Safety  Patient Goals: Comfort, Rest  Family Goals: no family present

## 2025-04-01 NOTE — DISCHARGE PLANNING
Case Management/IDT follow up.   Projected dc date: 4/3  IDT continues to recommend IRF level of care as patient continue to make progress with all therapies.      DC needs  Home health: PT/OT/SLP/RN/CNA/SW [facility] and O/P [facility] on [address]. Sent per MD request to do HHC then transition to O/P.   DME: Per therapy no DME recs.      Follow up: PCP, Neuro-Optometrist, Ortho     Pt and spouse agreeable with plan. TC with spouse who understand plan and denies further needs.      Plan: [facility] 1STE with spouse

## 2025-04-01 NOTE — THERAPY
Occupational Therapy  Daily Treatment     Patient Name:  Lico Caba  Age:  74 y.o., Sex:  male  Medical Record #:  [id number]  Today's Date:  4/1/2025    Precautions  Precautions: (P) Fall Risk, Cognition/Communication  Fall Risk: Poor balance  Cognition/Communication: (P) Expressive Aphasia  Swallow: Therapeutic dining  Comments: (P) R visual field cut    Subjective: Pt seated in w/c upon arrival w/ spouse present. Agreeable to participate in OT.      Objective:    04/01/25 1301   OT Charge Group   OT Cognitive Skill Development First 15 Minutes (Units) 1   OT Therapy Activity (Units) 1   OT Total Time Spent   OT Individual Total Time Spent (Mins) 30   Precautions   Precautions Fall Risk;Cognition/Communication   Cognition/Communication Expressive Aphasia   Comments R visual field cut   Vitals   O2 Delivery Device None - Room Air   Interdisciplinary Plan of Care Collaboration   IDT Collaboration with  Family / Caregiver   Patient Position at End of Therapy Seated;Chair Alarm On;Self Releasing Lap Belt Applied;Call Light within Reach   Collaboration Comments spouse present first few mins of OT session     SBA for functional mobility room <> therapy gym (no AD), VC provided for path finding and R side attention    Cognition  Purpose: to improve attention and focus, to improve problem solving and reasoning skills, to improve cognitive flexibility, to improve processing speed, to enhance/maintain overall cognitive-linguistics abilities, and to improve visual problem solving  Interventions:   Visual problem solving (i.e. puzzles/games)  20 mins to complete 24/33 pieces of large piece map puzzle (while standing @ elevated table), max cues for visual problem solving and mod-max cues for R side attention    Assessment:    Pt required max cues and significantly increased time to partially complete large piece map puzzle (while standing @ elevated table). Pt primarily limited by R visual field cut, aphasia and  impaired visual problem solving.   Strengths: Independent prior level of function, Making steady progress towards goals, Motivated for self care and independence, Pleasant and cooperative, Supportive family, Willingly participates in therapeutic activities  Barriers: Aphasia expressive, Aphasia receptive, Confused, Decreased endurance, Difficulty following instructions, Fatigue, Generalized weakness, Impaired activity tolerance, Impaired balance, Impaired carryover of learning, Impaired insight/denial of deficits, Impaired functional cognition, Impulsive, Limited mobility, Visual impairment    Plan:  R visual scanning and safety awareness, RUE neuro re-ed (ataxia), functional cognition (memory, aphasia, apraxia, attention, problem solving), overall strength/endurance and standing balance     Spouse cleared to ambulate with pt at FWW level     Lives with spouse, 1 story, 1 CAROL ANN, WIS, tub    DME  Passport items to be completed:  Perform bathroom transfers, complete dressing, complete feeding, get ready for the day, prepare a simple meal, participate in household tasks, adapt home for safety needs, demonstrate home exercise program, complete caregiver training     Occupational Therapy Goals (Active)       Problem: OT Long Term Goals       Dates: Start:  03/22/25         Goal: LTG-By discharge, patient will complete basic self care tasks at SPV-Mod I level.       Dates: Start:  03/22/25            Goal: LTG-By discharge, patient will perform bathroom transfers at SPV-Mod I level.       Dates: Start:  03/22/25

## 2025-04-01 NOTE — DISCHARGE PLANNING
Case Management Discharge Instructions    NOTE: This information can be found in your final discharge packet that your nurse will give you.      Follow-up Information:    Allyson Sutton M.D.  [address] [facility] [address]  [place] [id number] [phone]  Follow up        [facility]  [address] [facility] [address]  [facility] [address]  [phone]  Follow up         [facility] [address] [address]  [phone]  Follow up        [address]  [facility] [address]  [phone]  Follow up        [facility]  [address] [place] [address]  [address]   [place], [address]  #[phone]  Schedule an appointment as soon as possible for a visit after you completed your course with home health.

## 2025-04-01 NOTE — PROGRESS NOTES
NURSING DAILY NOTE    Name: Lico Caba   Date of Admission: 3/21/2025   Admitting Diagnosis: Intracranial hemorrhage, spontaneous intraparenchymal, idiopathic, acute ([facility])  Attending Physician: SAVITA GOODMAN M.D.  Allergies: Patient has no known allergies.    Safety  Patient Assist  Min  Patient Precautions  Precautions: Fall Risk, Cognition/Communication  Fall Risk: Poor balance, Alarming seat belt  Cognition/Communication: Expressive Aphasia  Swallow: Therapeutic dining  Comments: R visual field cut  Bed Transfer Status  Supervised  Toilet Transfer Status   Stand By Assist  Assistive Devices  Wheelchair  Oxygen  None - Room Air  Diet/Therapeutic Dining  Current Diet Order   Procedures    Diet Order Diet: Regular (T-dine, meats cut up)     Pill Administration  whole  Agitated Behavioral Scale  17  ABS Level of Severity  No Agitation    Fall Risk  Has the patient had a fall this admission?      Genesis Elizondo Fall Risk Scoring  22, HIGH RISK  Fall Risk Safety Measures  bed alarm, chair alarm, poor balance, and low vision/hearing    Vitals  Temperature: 37.1 °C (98.7 °F)  Temp src: Oral  Pulse: 67  Respiration: 18  Blood Pressure : 133/68  Blood Pressure MAP (Calculated): 90 MM HG  BP Location: Right, Upper Arm  Patient BP Position: Supine     Oxygen  Pulse Oximetry: 95 %  O2 (LPM): 0  O2 Delivery Device: None - Room Air    Bowel and Bladder  Last Bowel Movement  03/31/25  Stool Type  Type 6: Fluffy pieces with ragged edges, a mushy stool  Bowel Device  Bathroom  Continent  Bladder: Did not void   Bowel: No movement  Bladder Function  Urine Void (mL):  (large)  Number of Times Voided: 1  Urine Color: Yellow  Number of Times Incontinent of Urine: 0  Genitourinary Assessment   Bladder Assessment (WDL):  Within Defined Limits  Huston Catheter: Not Applicable  Urine Color: Yellow  Number of Bladder Accidents: 0  Total Number of Bladder of Accidents in Last 7 Days: 0  Number of Times Incontinent of Urine:  0  Bladder Device: Bathroom  Time Void: Yes  Bladder Scan: Post Void  $ Bladder Scan Results (mL): 34    Skin  Dereje Score   19  Sensory Interventions   Bed Types: Standard/Trauma Mattress  Skin Preventative Measures: Pillows in Use for Support / Positioning  Moisture Interventions         Pain  Pain Rating Scale  0 - No Pain  Pain Location  Head  Pain Location Orientation  Anterior  Pain Interventions   Declines    ADLs    Bathing   Shower, * * With Assistance from, Family / Significant Other  Linen Change   Partial  Personal Hygiene     Chlorhexidine Bath      Oral Care  Brushed Teeth  Teeth/Dentures     Shave  Full Assist (spouse)  Nutrition Percentage Eaten  Dinner, Between % Consumed  Environmental Precautions  Treaded Slipper Socks on Patient, Personal Belongings, Wastebasket, Call Bell etc. in Easy Reach, Bed in Low Position  Patient Turns/Positioning  Patient turns self independently side to side without assistance, to offload sacral area  Patient Turns Assistance/Tolerance  Standby Assist  Bed Positions  Bed Controls On  Head of Bed Elevated  Self regulated      Psychosocial/Neurologic Assessment  Psychosocial Assessment  Psychosocial (WDL):  WDL Except  Patient Behaviors: Confused  Family Behaviors: No Family Present  Neurologic Assessment  Neuro (WDL): Exceptions to WDL  Level of Consciousness: Alert  Orientation Level: Disoriented to time  Cognition: Follows commands, Appropriate safety awareness, Confused in conversation  Speech: Clear  Facial Symmetry: Right facial drooping  Pupil Assesment: No  Motor Function/Sensation Assessment: Motor strength  Muscle Strength Right Arm: Good Strength Against Gravity and Moderate Resistance  Muscle Strength Left Arm: Good Strength Against Gravity and Moderate Resistance  Muscle Strength Right Leg: Good Strength Against Gravity and Moderate Resistance  Muscle Strength Left Leg: Good Strength Against Gravity and Moderate Resistance  EENT (WDL):  WDL  Except    Cardio/Pulmonary Assessment  Edema      Respiratory Breath Sounds  RUL Breath Sounds: Clear  RML Breath Sounds: Clear  RLL Breath Sounds: Diminished  ZACH Breath Sounds: Clear  LLL Breath Sounds: Diminished  Cardiac Assessment   Cardiac (WDL):  Within Defined Limits

## 2025-04-02 ENCOUNTER — APPOINTMENT (OUTPATIENT)
Dept: PHYSICAL THERAPY | Facility: REHABILITATION | Age: 75
DRG: 057 | End: 2025-04-02
Attending: PHYSICAL MEDICINE & REHABILITATION
Payer: COMMERCIAL

## 2025-04-02 ENCOUNTER — APPOINTMENT (OUTPATIENT)
Dept: SPEECH THERAPY | Facility: REHABILITATION | Age: 75
DRG: 057 | End: 2025-04-02
Attending: PHYSICAL MEDICINE & REHABILITATION
Payer: COMMERCIAL

## 2025-04-02 ENCOUNTER — APPOINTMENT (OUTPATIENT)
Dept: OCCUPATIONAL THERAPY | Facility: REHABILITATION | Age: 75
DRG: 057 | End: 2025-04-02
Attending: PHYSICAL MEDICINE & REHABILITATION
Payer: COMMERCIAL

## 2025-04-02 PROCEDURE — A9270 NON-COVERED ITEM OR SERVICE: HCPCS | Performed by: STUDENT IN AN ORGANIZED HEALTH CARE EDUCATION/TRAINING PROGRAM

## 2025-04-02 PROCEDURE — 99232 SBSQ HOSP IP/OBS MODERATE 35: CPT | Performed by: PHYSICAL MEDICINE & REHABILITATION

## 2025-04-02 PROCEDURE — 92507 TX SP LANG VOICE COMM INDIV: CPT

## 2025-04-02 PROCEDURE — 700102 HCHG RX REV CODE 250 W/ 637 OVERRIDE(OP): Performed by: STUDENT IN AN ORGANIZED HEALTH CARE EDUCATION/TRAINING PROGRAM

## 2025-04-02 PROCEDURE — 700102 HCHG RX REV CODE 250 W/ 637 OVERRIDE(OP): Performed by: PHYSICAL MEDICINE & REHABILITATION

## 2025-04-02 PROCEDURE — 97110 THERAPEUTIC EXERCISES: CPT

## 2025-04-02 PROCEDURE — 97530 THERAPEUTIC ACTIVITIES: CPT

## 2025-04-02 PROCEDURE — A9270 NON-COVERED ITEM OR SERVICE: HCPCS | Performed by: PHYSICAL MEDICINE & REHABILITATION

## 2025-04-02 PROCEDURE — 97112 NEUROMUSCULAR REEDUCATION: CPT

## 2025-04-02 PROCEDURE — 97116 GAIT TRAINING THERAPY: CPT

## 2025-04-02 PROCEDURE — 770010 HCHG ROOM/CARE - REHAB SEMI PRIVAT*

## 2025-04-02 PROCEDURE — 94760 N-INVAS EAR/PLS OXIMETRY 1: CPT

## 2025-04-02 RX ORDER — LEVETIRACETAM 500 MG/1
500 TABLET ORAL 2 TIMES DAILY
Qty: 60 TABLET | Refills: 3 | Status: SHIPPED | OUTPATIENT
Start: 2025-04-02

## 2025-04-02 RX ORDER — TRAZODONE HYDROCHLORIDE 50 MG/1
50 TABLET ORAL
Qty: 30 TABLET | Refills: 0 | Status: SHIPPED | OUTPATIENT
Start: 2025-04-02

## 2025-04-02 RX ORDER — OMEPRAZOLE 20 MG/1
20 CAPSULE, DELAYED RELEASE ORAL DAILY
Qty: 30 CAPSULE | Refills: 3 | Status: SHIPPED | OUTPATIENT
Start: 2025-04-03

## 2025-04-02 RX ORDER — DONEPEZIL HYDROCHLORIDE 5 MG/1
5 TABLET, FILM COATED ORAL DAILY
Qty: 30 TABLET | Refills: 3 | Status: SHIPPED | OUTPATIENT
Start: 2025-04-03

## 2025-04-02 RX ORDER — ACETAMINOPHEN 500 MG
1000 TABLET ORAL EVERY 6 HOURS PRN
Qty: 30 TABLET | Refills: 0 | Status: SHIPPED | OUTPATIENT
Start: 2025-04-02

## 2025-04-02 RX ADMIN — ACETAMINOPHEN 1000 MG: 500 TABLET ORAL at 20:01

## 2025-04-02 RX ADMIN — DONEPEZIL HYDROCHLORIDE 5 MG: 5 TABLET, FILM COATED ORAL at 08:22

## 2025-04-02 RX ADMIN — LEVETIRACETAM 500 MG: 500 TABLET, FILM COATED ORAL at 20:01

## 2025-04-02 RX ADMIN — LEVETIRACETAM 500 MG: 500 TABLET, FILM COATED ORAL at 08:22

## 2025-04-02 RX ADMIN — OMEPRAZOLE 20 MG: 20 CAPSULE, DELAYED RELEASE ORAL at 08:22

## 2025-04-02 ASSESSMENT — ACTIVITIES OF DAILY LIVING (ADL)
SHOWER_TRANSFER_LEVEL_OF_ASSIST: REQUIRES SUPERVISION WITH SHOWER TRANSFER
TOILET_TRANSFER_LEVEL_OF_ASSIST: REQUIRES SUPERVISION WITH TOILET TRANSFER
TOILETING_LEVEL_OF_ASSIST: REQUIRES SUPERVISION WITH TOILETING

## 2025-04-02 ASSESSMENT — PAIN DESCRIPTION - PAIN TYPE
TYPE: ACUTE PAIN
TYPE: ACUTE PAIN

## 2025-04-02 ASSESSMENT — PATIENT HEALTH QUESTIONNAIRE - PHQ9
2. FEELING DOWN, DEPRESSED, IRRITABLE, OR HOPELESS: NOT AT ALL
1. LITTLE INTEREST OR PLEASURE IN DOING THINGS: NOT AT ALL
SUM OF ALL RESPONSES TO PHQ9 QUESTIONS 1 AND 2: 0

## 2025-04-02 NOTE — PROGRESS NOTES
NURSING DAILY NOTE    Name: Lico Caba   Date of Admission: 3/21/2025   Admitting Diagnosis: Intracranial hemorrhage, spontaneous intraparenchymal, idiopathic, acute ([facility])  Attending Physician: SAVITA GOODMAN M.D.  Allergies: Patient has no known allergies.    Safety  Patient Assist  Min assist  Patient Precautions  Precautions: Fall Risk, Cognition/Communication  Fall Risk: Poor balance  Cognition/Communication: Expressive Aphasia  Swallow: Therapeutic dining  Comments: R visual field cut  Bed Transfer Status  Supervised  Toilet Transfer Status   Stand By Assist  Assistive Devices  Wheelchair  Oxygen  None - Room Air  Diet/Therapeutic Dining  Current Diet Order   Procedures    Diet Order Diet: Regular (T-dine, meats cut up)     Pill Administration  whole and one at a time  Agitated Behavioral Scale  17  ABS Level of Severity  No Agitation    Fall Risk  Has the patient had a fall this admission?      Genesis Elizondo Fall Risk Scoring  18, HIGH RISK  Fall Risk Safety Measures  bed alarm, chair alarm, poor balance, and low vision/hearing    Vitals  Temperature: 36.2 °C (97.2 °F)  Temp src: Tympanic  Pulse: 72  Respiration: 18  Blood Pressure : 134/78  Blood Pressure MAP (Calculated): 97 MM HG  BP Location: Right, Upper Arm  Patient BP Position: Supine     Oxygen  Pulse Oximetry: 97 %  O2 (LPM): 0  O2 Delivery Device: None - Room Air    Bowel and Bladder  Last Bowel Movement  03/31/25  Stool Type  Type 6: Fluffy pieces with ragged edges, a mushy stool  Bowel Device  Bathroom  Continent  Bladder: continent   Bowel: continent   Bladder Function  Urine Void (mL):  (large)  Number of Times Voided: 1  Urine Color: Susie  Number of Times Incontinent of Urine: 0  Genitourinary Assessment   Bladder Assessment (WDL):  Within Defined Limits  Huston Catheter: Not Applicable  Urine Color: Susie  Number of Bladder Accidents: 0  Total Number of Bladder of Accidents in Last 7 Days: 0  Number of Times Incontinent of  Urine: 0  Bladder Device: Bathroom  Time Void: Yes  Bladder Scan: Post Void  $ Bladder Scan Results (mL): 34    Skin  Dereje Score   19  Sensory Interventions   Bed Types: Standard/Trauma Mattress  Skin Preventative Measures: Pillows in Use for Support / Positioning  Moisture Interventions         Pain  Pain Rating Scale  2 - Notice Pain, does not interfere with activities  Pain Location  Head  Pain Location Orientation  Anterior  Pain Interventions   Medication (see MAR)    ADLs    Bathing   Shower, Family / Significant Other  Linen Change   Complete  Personal Hygiene     Chlorhexidine Bath      Oral Care  Brushed Teeth  Teeth/Dentures     Shave  Full Assist (spouse)  Nutrition Percentage Eaten  Lunch, Between 50-75% Consumed  Environmental Precautions  Treaded Slipper Socks on Patient, Personal Belongings, Wastebasket, Call Bell etc. in Easy Reach, Bed in Low Position  Patient Turns/Positioning  Patient turns self independently side to side without assistance, to offload sacral area  Patient Turns Assistance/Tolerance  Standby Assist  Bed Positions  Bed Controls On, Bed Locked  Head of Bed Elevated  Self regulated      Psychosocial/Neurologic Assessment  Psychosocial Assessment  Psychosocial (WDL):  WDL Except  Patient Behaviors: Confused  Family Behaviors: Anxious  Neurologic Assessment  Neuro (WDL): Exceptions to WDL  Level of Consciousness: Alert  Orientation Level: Disoriented to time  Cognition: Follows commands, Appropriate safety awareness, Confused in conversation  Speech: Clear  Facial Symmetry: Right facial drooping  Pupil Assesment: No  Motor Function/Sensation Assessment: Motor strength  Muscle Strength Right Arm: Good Strength Against Gravity and Moderate Resistance  Muscle Strength Left Arm: Good Strength Against Gravity and Moderate Resistance  Muscle Strength Right Leg: Good Strength Against Gravity and Moderate Resistance  Muscle Strength Left Leg: Good Strength Against Gravity and Moderate  Resistance  EENT (WDL):  WDL Except    Cardio/Pulmonary Assessment  Edema      Respiratory Breath Sounds  RUL Breath Sounds: Clear  RML Breath Sounds: Clear  RLL Breath Sounds: Diminished  ZACH Breath Sounds: Clear  LLL Breath Sounds: Diminished  Cardiac Assessment   Cardiac (WDL):  Within Defined Limits

## 2025-04-02 NOTE — PROGRESS NOTES
NURSING DAILY NOTE    Name: Lico Caba  Date of Admission: 3/21/2025  Admitting Diagnosis: Intracranial hemorrhage, spontaneous intraparenchymal, idiopathic, acute ([facility])  Attending Physician: Vivek Klein M.d.  Allergies: Patient has no known allergies.    Safety  Patient Assist  ominimum assist  Patient Precautions  o   Precaution Comments  o   Bed Transfer Status  o   Toilet Transfer Status  o   Assistive Devices  oRails, Wheelchair  Oxygen  oNone - Room Air  Diet/Therapeutic Dining  o  Current Diet Order   Procedures    Diet Order Diet: Regular (T-dine, meats cut up)     Pill Administration  owhole  Agitated Behavioral Scale  o17  ABS Level of Severity  Deann Agitation    Fall Risk  Has the patient had a fall this admission?  Deann  Genesis Elizondo Fall Risk Scoring  o18, HIGH RISK  Fall Risk Safety Measures  chadd alarm, chair alarm, and seatbelt alarm    Vitals  Temperature: 36.8 °C (98.2 °F)  Temp src: Oral  Pulse: 68  Respiration: 18  Blood Pressure : 92/56  Blood Pressure MAP (Calculated): 68 MM HG  BP Location: Left, Upper Arm  Patient BP Position: Sitting    Oxygen  Pulse Oximetry: 96 %  O2 (LPM): 0  O2 Delivery Device: None - Room Air    Bowel and Bladder  Last Bowel Movement  o03/31/25  Stool Type  oType 6: Fluffy pieces with ragged edges, a mushy stool  Bowel Device  oBathroom  Continent  oBladder: Did not void  oBowel: No movement  Bladder Function  oUrine Void (mL):  (large)  Number of Times Voided: 1  Urine Color: Yellow  Number of Times Incontinent of Urine: 0  Genitourinary Assessment  oBladder Assessment (WDL):  Within Defined Limits  Huston Catheter: Not Applicable  Urine Color: Yellow  Number of Bladder Accidents: 0  Total Number of Bladder of Accidents in Last 7 Days: 0  Number of Times Incontinent of Urine: 0  Bladder Device: Bathroom  Time Void: Yes  Bladder Scan: Post Void  $ Bladder Scan Results (mL): 34    Skin  Dereje Score  o 19  Sensory Interventions  o Bed Types:  Standard/Trauma Mattress  Skin Preventative Measures: Pillows in Use for Support / Positioning  Moisture Interventions  o       Pain  Pain Rating Scale  o1 - Hardly Notice Pain  Pain Location  oHead  Pain Location Orientation  oAnterior  Pain Interventions  oDeclines    ADLs    Bathing  oShower, Family / Significant Other  Linen Change  oComplete  Personal Hygiene  oMoist Marcie Wipes  Chlorhexidine Bath  o   Oral Care  oBrushed Teeth  Teeth/Dentures  o   Shave  oFull Assist (spouse)  Nutrition Percentage Eaten  oLunch, Between 50-75% Consumed  Environmental Precautions  oTreaded Slipper Socks on Patient, Personal Belongings, Wastebasket, Call Bell etc. in Easy Reach, Bed in Low Position  Patient Turns/Positioning  oPatient turns self independently side to side without assistance, to offload sacral area  Patient Turns Assistance/Tolerance  oStandby Assist  Bed Positions  María Controls On, Bed Locked  Head of Bed Elevated  oSelf regulated      Psychosocial/Neurologic Assessment  Psychosocial Assessment  oPsychosocial (WDL):  WDL Except  Patient Behaviors: Confused  Family Behaviors: No Family Present  Neurologic Assessment  oNeuro (WDL): Exceptions to WDL  Level of Consciousness: Alert  Orientation Level: Disoriented to time  Cognition: Follows commands, Appropriate safety awareness, Confused in conversation  Speech: Clear  Facial Symmetry: Right facial drooping  Pupil Assesment: No  Motor Function/Sensation Assessment: Motor strength  Muscle Strength Right Arm: Good Strength Against Gravity and Moderate Resistance  Muscle Strength Left Arm: Good Strength Against Gravity and Moderate Resistance  Muscle Strength Right Leg: Good Strength Against Gravity and Moderate Resistance  Muscle Strength Left Leg: Good Strength Against Gravity and Moderate Resistance  oEENT (WDL):  WDL Except    Cardio/Pulmonary Assessment  Edema  o   Respiratory Breath Sounds  oRUL Breath Sounds: Clear  RML Breath Sounds: Clear  RLL Breath Sounds:  Diminished  ZACH Breath Sounds: Clear  LLL Breath Sounds: Diminished  Cardiac Assessment  oCardiac (WDL):  Within Defined Limits

## 2025-04-02 NOTE — CARE PLAN
"  Problem: Fall Risk - Rehab  Goal: Patient will remain free from falls  Outcome: Progressing   Hurtado Mikhail Fall risk Assessment Score: 18    High fall risk Interventions   - Alarming seatbelt  - Bed and strip alarm   - Yellow sign by the door   - Yellow wrist band \"Fall risk\"  - Room near to the nurse station  - Do not leave patient unattended in the bathroom  - Fall risk education provided  - Call light within reach   - Yellow  socks   - Belongings within reach   - Bed in the lowest position        Problem: Pain - Standard  Goal: Alleviation of pain or a reduction in pain to the patient’s comfort goal  Outcome: Progressing   Patient is able to rate pain on 0-10 scale.   "

## 2025-04-02 NOTE — DISCHARGE INSTRUCTIONS
[facility] NURSING DISCHARGE INSTRUCTIONS    Blood Pressure : 130/74  Weight: 80 kg (176 lb 5.9 oz)  Nursing recommendations for Shannan Bryan Rosales at time of discharge are as follows:  Client and Family Member verbalized understanding of all discharge instructions and prescriptions.     Review all your home medications and newly ordered medications with your doctor and/or pharmacist. Follow medication instructions as directed by your doctor and/or pharmacist.    Pain Management:   Discharge Pain Medication Instructions:  Comfort Goal: Comfort with Movement, Sleep Comfortably  Notify your primary care provider if pain is unrelieved with these measures, if the pain is new, or increased in intensity.    Skin / Wound Care Instructions: Please contact your primary care physician for any change in skin integrity.     If You Have Surgical Incisions / Wounds:  Monitor surgical site(s) for signs of increased swelling, redness or symptoms of drainage from the site or fever as this could indicate signs and symptoms of infection. If these symptoms are noted, notifiy your primary care provider.      Discharge Safety Instructions:       Discharge Safety Concerns:    The interdisciplinary team has made recommendation that you should not be left alone  in the house due to weakness  Anti-embolic stockings are not required to increase circulation to the lower extremities.      Nursing Discharge Plan:   Influenza Vaccine Indication: Not indicated: Previously immunized this influenza season and > 8 years of age      Discharge Medication Instructions:  Below are the medications your physician expects you to take upon discharge:          Suicidal Feelings: How to Help Yourself  Suicide is when you end your own life. Suicidal ideation includes expressing thoughts about, or a preoccupation with, ending your own life. There are many things you can do to help yourself feel better when struggling with these  feelings. Many services and people are available to support you and others who struggle with similar feelings.  If you ever feel like you may hurt yourself or others, or have thoughts about taking your own life, get help right away. To get help:  Go to your nearest emergency department.  Call your local emergency services (911 in the [place]  Call the [facility] and [facility] services helpline (211 in the [place]  Call or text a suicide hotline to speak with a trained counselor. The following suicide hotlines are available in the [place]:  0-746-910-TALK (1-258.114.8273 or 334 in the [place]  5-529-ZASWYTC (1-469.141.9368).  Text 076434. This is the Crisis Text Line in the [place]  1-964.553.5327. This is a hotline for Welsh speakers.  1-516.896.6520. This is a hotline for TTY users.  2-830-0-U-JOHNNY (1-938.608.4977). This is a hotline for lesbian, ray, bisexual, transgender, or questioning youth.  For a list of hotlines in [place], visit suicide.org/hotlines/international/iykksb-uqwcxpp-stepceko.html  Contact a crisis center or a local suicide prevention center. To find a crisis center or suicide prevention center:  Call your local hospital, clinic, community service organization, mental health center, social service provider, or health department. Ask for help with connecting to a crisis center.  For a list of crisis centers in the [place], visit: suicidepreventionlifeline.org  For a list of crisis centers in [place], visit: suicideprevention.ca  How to help yourself feel better    Promise yourself that you will not do anything bad or extreme when you have suicidal feelings. Remember the times you have felt hopeful.  Many people have gotten through suicidal thoughts and feelings, and you can too.  If you have had these feelings before, remind yourself that you can get through them again.  Let family, friends, teachers, or counselors know how you are feeling. Do not separate yourself from those who care  about you and want to help you.  Talk with someone every day, even if you do not feel like talking to anyone or being with other people.  Face-to-face conversation is best to help them understand your feelings.  Contact a mental health care provider and work with this person regularly.  Make a safety plan that you can follow during a crisis.  Include phone numbers of suicide prevention hotlines, mental health professionals, and trusted friends and family members you can call during an emergency.  Save these numbers on your phone.  If you are thinking of taking a lot of medicine, give your medicine to someone who can give it to you as prescribed.  If you are on antidepressants and are concerned you will overdose, tell your health care provider so that he or she can give you safer medicines.  Try to stick to your routines and follow a schedule every day. Make self-care a priority.  Make a list of realistic goals, and cross them off when you achieve them. Accomplishments can give you a sense of worth.  Wait until you are feeling better before doing things that you find difficult or unpleasant.  Do things that you have always enjoyed to take your mind off your feelings.  Try reading a book, or listening to or playing music.  Spending time outside, in nature, may help you feel better.  Follow these instructions at home:    Visit your primary health care provider every year for a physical and a mental health checkup.  Take over-the-counter and prescription medicines only as told by your health care provider.  Ask your health care provider about the possible side effects of any medicines you are taking.  Ask your health care provider about whether suicidal ideation is a possible side effect of any of your medicines.  Learn about suicidal ideation and what increases the risk for the development of suicidal thoughts.  Eat a well-balanced diet, and eat regular meals.  Get plenty of rest.  Exercise if you are able. Just 30  minutes of exercise each day can help you feel better.  Keep your living space well lit.  Do not use alcohol or drugs. Remove these substances from your home.  General recommendations  Remove weapons, poisons, knives, and other deadly items from your home.  Work with a mental health care provider as needed.  When you are feeling well, write yourself a letter with tips and support that you can read when you are not feeling well.  Remember that life's difficulties can be sorted out with help. Conditions can be treated, and you can learn behaviors and ways of thinking that will help you.  Work with your health care provider or counselor to learn ways of coping with your thoughts and feelings.  Where to find more information  National Suicide Prevention Lifeline: www.suicidepreventionlifeline.org  Hopeline: www.hopeline.[facility]  American Foundation for Suicide Prevention: www.afsp.org  The Luciano Project (for lesbian, ray, bisexual, transgender, or questioning youth): www.thetrevorproject.org  National [place] of Mental Health: www.nimh.nih.gov/health/topics/suicide-prevention  Suicide Prevention Resources: afsp.org/suicide-prevention-resources  Contact a health care provider if:  You feel as though you are a burden to others.  You feel agitated, angry, vengeful, or have extreme mood swings.  You have withdrawn from family and friends.  You are frequently using drugs or alcohol.  Get help right away if:  You are talking about suicide or wishing to die.  You start making plans for how to commit suicide.  You feel that you have no reason to live.  You start making plans for putting your affairs in order, saying goodbye, or giving your possessions away.  You feel guilt, shame, or unbearable pain, and it seems like there is no way out.  You are engaging in risky behaviors that could lead to death.  If you have any of these thoughts or symptoms, get help right away:  Go to your nearest emergency department or crisis  [place].  Call emergency services (911 in the [place]  Call or text a suicide crisis helpline.  Summary  Suicide is when you take your own life. Suicidal feelings are thoughts about ending your own life.  Promise yourself that you will not do anything bad or extreme when you have suicidal feelings.  Let family, friends, teachers, or counselors know how you are feeling.  Get help right away if you start making plans for how to commit suicide.  This information is not intended to replace advice given to you by your health care provider. Make sure you discuss any questions you have with your health care provider.  Document Revised: 07/14/2022 Document Reviewed: 04/28/2022  [employer] Patient Education © 2023 [employer]        Understanding Your Risk for Falls  Each year, millions of people have serious injuries from falls. It is important to understand your risk for falling. Talk with your health care provider about your risk and what you can do to lower it. There are actions you can take at home to lower your risk and prevent falls.  If you do have a serious fall, make sure to tell your health care provider. Falling once raises your risk of falling again.  How can falls affect me?  Serious injuries from falls are common. These include:  Broken bones, such as hip fractures.  Head injuries, such as traumatic brain injuries (TBI) or concussion.  A fear of falling can cause you to avoid activities and stay at home. This can make your muscles weaker and actually raise your risk for a fall.  What can increase my risk?  There are a number of risk factors that increase your risk for falling. The more risk factors you have, the higher your risk of falling. Serious injuries from a fall happen most often to people older than age 65. Children and young adults ages 15-29 are also at higher risk.  Common risk factors include:  Weakness in the lower body.  Lack (deficiency) of vitamin D.  Being generally weak or confused due to  long-term (chronic) illness.  Dizziness or balance problems.  Poor vision.  Medicines that cause dizziness or drowsiness. These can include medicines for your blood pressure, heart, anxiety, insomnia, or edema, as well as pain medicines and muscle relaxants.  Other risk factors include:  Drinking alcohol.  Having had a fall in the past.  Having depression.  Having foot pain or wearing improper footwear.  Working at a dangerous job.  Having any of the following in your home:  Tripping hazards, such as floor clutter or loose rugs.  Poor lighting.  Pets.  Having dementia or memory loss.  What actions can I take to lower my risk of falling?         Physical activity  Maintain physical fitness. Do strength and balance exercises. Consider taking a regular class to build strength and balance. Yoga and ibrahima chi are good options.  Vision  Have your eyes checked every year and your vision prescription updated as needed.  Walking aids and footwear  Wear nonskid shoes. Do not wear high heels.  Do not walk around the house in socks or slippers.  Use a cane or walker as told by your health care provider.  Home safety  Attach secure railings on both sides of your stairs.  Install grab bars for your tub, shower, and toilet. Use a bath mat in your tub or shower.  Use good lighting in all rooms. Keep a flashlight near your bed.  Make sure there is a clear path from your bed to the bathroom. Use night-lights.  Do not use throw rugs. Make sure all carpeting is taped or tacked down securely.  Remove all clutter from walkways and stairways, including extension cords.  Repair uneven or broken steps.  Avoid walking on icy or slippery surfaces. Walk on the grass instead of on icy or slick sidewalks. Use ice melt to get rid of ice on walkways.  Use a cordless phone.  Questions to ask your health care provider  Can you help me check my risk for a fall?  Do any of my medicines make me more likely to fall?  Should I take a vitamin D  supplement?  What exercises can I do to improve my strength and balance?  Should I make an appointment to have my vision checked?  Do I need a bone density test to check for weak bones or osteoporosis?  Would it help to use a cane or a walker?  Where to find more information  Centers for Disease Control and PreventionEDDIE: www.cdc.gov  Community-Based Fall Prevention Programs: www.cdc.gov  National [place] on Aging: www.fidelia.nih.gov  Contact a health care provider if:  You fall at home.  You are afraid of falling at home.  You feel weak, drowsy, or dizzy.  Summary  Serious injuries from a fall happen most often to people older than age 65. Children and young adults ages 15-29 are also at higher risk.  Talk with your health care provider about your risks for falling and how to lower those risks.  Taking certain precautions at home can lower your risk for falling.  If you fall, always tell your health care provider.  This information is not intended to replace advice given to you by your health care provider. Make sure you discuss any questions you have with your health care provider.  Document Revised: 07/21/2021 Document Reviewed: 07/21/2021  Elsevier Patient Education © 2023 [employer]      Stroke Prevention  Some medical conditions and lifestyle choices can lead to a higher risk for a stroke. You can help to prevent a stroke by eating healthy foods and exercising. It also helps to not smoke and to manage any health problems you may have.  How can this condition affect me?  A stroke is an emergency. It should be treated right away. A stroke can lead to brain damage or threaten your life. There is a better chance of surviving and getting better after a stroke if you get medical help right away.  What can increase my risk?  The following medical conditions may increase your risk of a stroke:  Diseases of the heart and blood vessels (cardiovascular disease).  High blood pressure  (hypertension).  Diabetes.  High cholesterol.  Sickle cell disease.  Problems with blood clotting.  Being very overweight.  Sleeping problems (obstructivesleep apnea).  Other risk factors include:  Being older than age 60.  A history of blood clots, stroke, or mini-stroke (TIA).  Race, ethnic background, or a family history of stroke.  Smoking or using tobacco products.  Taking birth control pills, especially if you smoke.  Heavy alcohol and drug use.  Not being active.  What actions can I take to prevent this?  Manage your health conditions  High cholesterol.  Eat a healthy diet. If this is not enough to manage your cholesterol, you may need to take medicines.  Take medicines as told by your doctor.  High blood pressure.  Try to keep your blood pressure below 130/80.  If your blood pressure cannot be managed through a healthy diet and regular exercise, you may need to take medicines.  Take medicines as told by your doctor.  Ask your doctor if you should check your blood pressure at home.  Have your blood pressure checked every year.  Diabetes.  Eat a healthy diet and get regular exercise. If your blood sugar (glucose) cannot be managed through diet and exercise, you may need to take medicines.  Take medicines as told by your doctor.  Talk to your doctor about getting checked for sleeping problems. Signs of a problem can include:  Snoring a lot.  Feeling very tired.  Make sure that you manage any other conditions you have.  Nutrition    Follow instructions from your doctor about what to eat or drink. You may be told to:  Eat and drink fewer calories each day.  Limit how much salt (sodium) you use to 1,500 milligrams (mg) each day.  Use only healthy fats for cooking, such as olive oil, canola oil, and sunflower oil.  Eat healthy foods. To do this:  Choose foods that are high in fiber. These include whole grains, and fresh fruits and vegetables.  Eat at least 5 servings of fruits and vegetables a day. Try to fill  "one-half of your plate with fruits and vegetables at each meal.  Choose low-fat (lean) proteins. These include low-fat cuts of meat, chicken without skin, fish, tofu, beans, and nuts.  Eat low-fat dairy products.  Avoid foods that:  Are high in salt.  Have saturated fat.  Have trans fat.  Have cholesterol.  Are processed or pre-made.  Count how many carbohydrates you eat and drink each day.  Lifestyle  If you drink alcohol:  Limit how much you have to:  0-1 drink a day for women who are not pregnant.  0-2 drinks a day for men.  Know how much alcohol is in your drink. In the [place] one drink equals one 12 oz bottle of beer (355mL), one 5 oz glass of wine (148mL), or one 1½ oz glass of hard liquor (44mL).  Do not smoke or use any products that have nicotine or tobacco. If you need help quitting, ask your doctor.  Avoid secondhand smoke.  Do not use drugs.  Activity    Try to stay at a healthy weight.  Get at least 30 minutes of exercise on most days, such as:  Fast walking.  Biking.  Swimming.  Medicines  Take over-the-counter and prescription medicines only as told by your doctor.  Avoid taking birth control pills. Talk to your doctor about the risks of taking birth control pills if:  You are over 35 years old.  You smoke.  You get very bad headaches.  You have had a blood clot.  Where to find more information  American Stroke Association: www.strokeassociation.org  Get help right away if:  You or a loved one has any signs of a stroke. \"BE FAST\" is an easy way to remember the warning signs:  B - Balance. Dizziness, sudden trouble walking, or loss of balance.  E - Eyes. Trouble seeing or a change in how you see.  F - Face. Sudden weakness or loss of feeling of the face. The face or eyelid may droop on one side.  A - Arms. Weakness or loss of feeling in an arm. This happens all of a sudden and most often on one side of the body.  S - Speech. Sudden trouble speaking, slurred speech, or trouble understanding what people " say.  T - Time. Time to call emergency services. Write down what time symptoms started.  You or a loved one has other signs of a stroke, such as:  A sudden, very bad headache with no known cause.  Feeling like you may vomit (nausea).  Vomiting.  A seizure.  These symptoms may be an emergency. Get help right away. Call your local emergency services (911 in the [place]  Do not wait to see if the symptoms will go away.  Do not drive yourself to the hospital.  Summary  You can help to prevent a stroke by eating healthy, exercising, and not smoking. It also helps to manage any health problems you have.  Do not smoke or use any products that contain nicotine or tobacco.  Get help right away if you or a loved one has any signs of a stroke.  This information is not intended to replace advice given to you by your health care provider. Make sure you discuss any questions you have with your health care provider.  Document Revised: 07/19/2021 Document Reviewed: 07/19/2021  [employer] Patient Education © 2023 [employer]        Speech Therapy Discharge Instructions for Shannan Bryan Rosales    4/2/2025  Assistance managing meds/finances     Aphasia:  Aphasia is a communication impairment usually acquired as a result of a stroke or other brain injury. It affects both the ability to express oneself through speech, gesture, and writing, and to understand the speech, gesture, and writing of others. Aphasia thus changes the way in which we communicate with those people most important to us: family, friends, and co-workers. Make sure you have the person's attention before communicating.   During conversation, minimize or eliminate background noise (such as television, radio, other people) as much as possible.   Keep communication simple but adult. Simplify your own sentence structure and reduce your own rate of speech. You don't need to speak louder than normal but do emphasize key words. Don't talk down to the person with aphasia.    Encourage and use other modes of communication (writing, drawing, yes/no responses, choices, gestures, eye contact, facial expressions) in addition to speech. Use what seems to work consistently.  Give them time to talk and let them have a reasonable amount of time to respond. Avoid speaking for the person with aphasia except when necessary and ask permission before doing so.   Praise all attempts to speak; make speaking a pleasant experience and provide stimulating conversation. Downplay errors and avoid frequent criticisms/corrections. Avoid insisting that each word be produced perfectly.   Augment speech with gesture and visual aids whenever possible. Repeat a statement when necessary.   Encourage them to be as independent as possible. Avoid being overprotective.   Whenever possible continue normal activities (such as dinner with family, company, going out). Do not shield people with aphasia from family or friends or ignore them in a group conversation. Rather, try to involve them in family decision-making as much as possible. Keep them informed of events but avoid burdening them with day to day details.     Medication Management:  Shannan would benefit from assistance managing his medications.  It is recommended that you purchase a pill organizer to sort medications in on a weekly basis.  Implementing a system to help remind you to take your medications will also be helpful (Ex: setting alarms, having a friend/family member call as a reminder).     Finance Management:  It is recommended that Shannan has assistance when managing any financial tasks.      Expressive Language:  Expressive Aphasia: This disorder refers to impairment in expressed speech. With expressive aphasia, the patient knows what he or she wants to say, but cannot find the words to say it.  In mild cases, only an occasional word or two is lost, and the communication can proceed fairly normally. In more severe cases, most or all words can be  "lost, and the person needs to find an alternative means of communication.  Below are some strategies you can use to help your loved one express information:     What is Circumlocution?    Circumlocution (sir-cum-low-cue-shun) is the act of describing many features of an object, event, or action without saying the exact word for the object, event, or action.     It is a common strategy used by individuals with word finding difficulties. When at a loss for an intended word they will often describe the word to get their message across (and at the same time try to cue the word they're looking for).     For example, if an individual with word finding difficulty couldn't think of the word, axe, they might say;   \"It's the thing that chops wood. You know, lumberjacks use them; it has a long handle and a sharp edge. They chop down trees with it ... what is that thing called?\"    Using \"Cues\" to help Word Retrieval    Cues are simply giving someone (or yourself) a hint or clue as to what the missing word might be. Remember, you usually know the word you're trying to retrieve, so a clue might help you find it more rapidly.   There are two types of useful cues:    1. Phonemic Cues: These cues use sounds to aid in a patient's word retrieval. The first vowel or consonant sound of the missing word is typically used as the phonemic cue.  For example, if the missing word is soup you could cue that word by making an extended \"S\" sound. You could say, \"Oh, you're thinking of a word that starts with Sssssss ...\"  Hearing the initial \"S\" sound can sometimes trigger an individual's memory of the full word.   Another strategy linked to phonemic cues is rhyming. For example, if a patient is having difficulty retrieving the word free, you could give them a cue by telling them that the word they are looking for rhymes with tree or three.    2. Semantic Cues: These cues are category or background clues.   A category cue would provide the " "category or group the missing word belongs to. For example, if the missing word is horse, you could cue that word by saying, \"It's a farm animal.\"   A background cue would state a function of the word. For example, if the missing word is hammer, you could say, \"It is used to pound nails.\"     The Cloze Exercise  Another semantic strategy is using a cloze exercise. This requires using an understanding of all the words in a sentence to give you a good idea of what the missing word might be.     For example, if the target word is door, you could say, \"I unlocked the front _________.\"   You wouldn't unlock the front tree, so the strategy here is to use the related information (unlock and front) to help determine what the target word is (door).    Receptive Language:  Receptive Aphasia: This disorder involves an impairment in which the patient cannot understand the spoken word. In mild cases, only an occasional word or two sounds garbled or incomprehensible, and communication can proceed because the patient is able to get the essence of the communication based on the context.   In severe cases, the patient may experience most or all of the communication as if nonsense syllables are being spoken.  In moderate cases the patient is able to understand part, but not all of the communication. Sometimes, when patients appear to be noncompliant, it is because they have misunderstood the communication in the first place.  Below are some strategies you can use to help your loved one understand information:    Allow the patient ample time to respond to questions and instructions. Most aphasic patients require more time to process incoming and outgoing messages. Let the patient try to speak before you supply the word for him or ask further questions to determine the message.     Keep instructions and explanations simple. If the comprehension problem is severe, single-word instructions may help the patient to recall words and make " understanding easier. Try to keep your conversations geared to the patient’s immediate needs and surroundings.     As direct questions requiring “yes” or “no” answers. Encourage gestures and pointing whenever possible. Frequently this will enable the patient to say the word, and can help reduce frustration.     Write out key words and phrases while you talk. For example, “What would you like for lunch? Soup or a sandwich?” while writing “soup” and “sandwich”. Point to each word while you say it. This will help with comprehension, while also activating reading and speaking centers in the brain.    You may feel that your family member’s understanding is good, when it may be more impaired than you realize. You may become upset with your spouse because they don’t seem to respond to what you are saying, or that they are “forgetting” what was just discussed. People with aphasia may react appropriately during social situations, which gives the impression that they understand the conversation. This may be a successful communication situation on the surface, but they may not understand some of the message. It can be a problem when the conversation is fast and there are many people talking.    Use gestures a lot when you are talking, like an informal sign language. Your gesture represents the idea or object. Examples of this would be using your hand in a cutting motion for “cut”, your finger to your temple when saying “think”, or pointing to the cake when you talk about it “Do you want some cake?”    It was great working with you Shannan! Good luck on your continued recovery! (Pily- speech therapy)

## 2025-04-02 NOTE — THERAPY
Speech Language Pathology  Daily Treatment     Patient Name:  Lico Caba  Age:  74 y.o., Sex:  male  Medical Record #:  [id number]  Today's Date: 4/2/2025    Precautions  Precautions: Fall Risk, Cognition/Communication  Fall Risk: Poor balance  Cognition/Communication: Expressive Aphasia, Receptive Aphasia  Swallow: Therapeutic dining  Comments: R Visual field cut    Subjective: The pt was found seated in his room and was agreeable to ST session. The pt presented with significantly lower fatigue levels in comparison to the previous session.      Objective:    04/02/25 1033   SLP Charge Group   SLP Treatment - Individual Speech Language Treatment - Individual   SLP Total Time Spent   SLP Individual Total Time Spent (Mins) 60   Precautions   Precautions Fall Risk;Cognition/Communication   Fall Risk Poor balance   Cognition/Communication Expressive Aphasia;Receptive Aphasia   Comments R Visual field cut   Impairment Assessments   Impairment Assessments Comprehension;Expression;Social Interaction;Problem Solving;Memory;Dysphagia   Comprehension Level of Assist Minimal Assist   Comprehension Description Cueing needed;Extra time needed   Expression Level of Assist Minimal Assist   Expression Description Cueing needed;Extra time needed   Social Interaction Level of Assist Modified Independent   Social Interaction Description Extra time needed   Problem Solving Level of Assist Moderate Assist;Minimal Assist   Problem Solving Description Cueing needed;Extra time needed   Memory Level of Assist Minimal Assist;Moderate Assist   Memory Description Cueing needed;Extra time needed   Interdisciplinary Plan of Care Collaboration   Patient Position at End of Therapy Seated;Chair Alarm On         Receptive Language/Auditory Comprehension  Purpose: to improve comprehension of spoken/written language, to improve comprehension of instructions, and to improve overall ability to communicate  Interventions:   Identifies  pictures  Answers yes/no personal questions  Understands simple/structured conversation    Expressive Language  Purpose: to improve confidence in verbal expression across different environments (i.e. home, work, school, social settings, etc.), to improve the ability to verbally express thoughts, needs, emotions, and ideas in everyday communication, and to improve the use of appropriate vocabulary, sentence structure, and grammar  Interventions:   Naming  Automatic language  Verbalizing wants/needs  Responding to questions  Expressive language deficits characterized by: Perseverations and Word Finding Deficits (Anomia)     The pt answered 5 y/n personal questions achieving 100% accuracy independently. He achieved 4/5 (80%) accuracy independently for open-ended personal questions.     The pt achieved 16/20 (80%) for picture naming achieving 19/20 (95%) when given min A.     The pt completed SFA tasks using the [employer] picture naming agapito achieving 5/10 (50%) independently and increasing to 100% with mod A.      Assessment:     The pt required overall min-mod A to complete receptive/expressive language tasks throughout the session. Notably, he presented with a significant increase in both receptive/expressive language abilities and decreased processing time likely due to an earlier session. He benefited from sentence completion, semantic, phonemic, and visual cues throughout the session to improve accuracy.     Strengths: Able to follow instructions, Pleasant and cooperative, Supportive family, Willingly participates in therapeutic activities, Motivated for self care and independence  Barriers: Aphasia expressive, Aphasia receptive, Impaired functional cognition, Visual impairment    Plan:  D/C home tomorrow     Speech Therapy Problems (Active)       Problem: Expression STGs       Dates: Start:  03/22/25         Goal: STG-Within one week, patient will       Dates: Start:  03/22/25       Description: 1) Individualized  goal:  describe selected vocabulary by semantic features with 80% acc and MIN A.   2) Interventions:  SLP Speech Language Treatment, SLP Self Care / ADL Training , and SLP Group Treatment          Goal Note filed on 03/31/25 1553 by Areli Kerr, Student       Pt requires mod-max A to complete SFA tasks.                 Problem: Memory STGs       Dates: Start:  03/22/25         Goal: STG-Within one week, patient will       Dates: Start:  03/22/25       Description: 1) Individualized goal:  complete working memory tasks w/ 80% accuracy and MIN A.   2) Interventions:  SLP Self Care / ADL Training , SLP Cognitive Skill Development, and SLP Group Treatment          Goal Note filed on 03/31/25 1553 by Areli Kerr, Student       Goal not targeted. Initiate working memory tasks.                  Problem: Social Interaction STGs       Dates: Start:  03/22/25         Goal: STG-Within one week, patient will       Dates: Start:  03/22/25               Problem: Speech/Swallowing LTGs       Dates: Start:  03/22/25         Goal: LTG-By discharge, patient will safely swallow       Dates: Start:  03/22/25       Description: 1) Individualized goal:  regular textures and thin liquids implementing safe swallow strategies in >90% of opportunities with MOD I for a safe D/C to PLOF.   2) Interventions:  SLP Swallowing Dysfunction Treatment, SLP Self Care / ADL Training , and SLP Group Treatment             Goal: LTG-By discharge, patient will solve basic problems       Dates: Start:  03/22/25       Description: 1) Individualized goal:  w/ 80% acc and MOD I for a safe D/C to PLOF.   2) Interventions:  SLP Speech Language Treatment, SLP Self Care / ADL Training , SLP Cognitive Skill Development, and SLP Group Treatment                Problem: Swallowing STGs       Dates: Start:  03/22/25         Goal: STG-Within one week, patient will       Dates: Start:  03/22/25

## 2025-04-02 NOTE — CARE PLAN
Problem: OT Long Term Goals  Goal: LTG-By discharge, patient will complete basic self care tasks at SPV-Mod I level.  Outcome: Met  Goal: LTG-By discharge, patient will perform bathroom transfers at SPV-Mod I level.  Outcome: Met

## 2025-04-02 NOTE — PROGRESS NOTES
"  Physical Medicine & Rehabilitation Progress Note    Encounter Date: 4/2/2025    Chief Complaint: weakness    Interval Events (Subjective):  Seen in chair. Tolerating therapy. Med rec complete. Ready for discharge tomorrow    Objective:  VITAL SIGNS: /67   Pulse 77   Temp 36.7 °C (98.1 °F) (Temporal)   Resp 18   Ht 1.854 m (6' 1\")   Wt 80 kg (176 lb 5.9 oz)   SpO2 100%   BMI 23.27 kg/m²   Gen: No acute distress, well developed well nourished adult  HEENT: Normal Cephalic Atraumatic, Normal conjunctiva.   CV: warm extremities, well perfused, no edema  Resp: symmetric chest rise, breathing comfortably on room air  Abd: Soft, Non distended  Extremities: normal bulk, no atrophy  Skin: no visible rashes or lesions.   Neuro: alert, awake  Psych: Mood and affect appropriate and congruent    Laboratory Values:  Recent Results (from the past 72 hours)   CBC WITHOUT DIFFERENTIAL    Collection Time: 04/01/25  5:54 AM   Result Value Ref Range    WBC 8.8 4.8 - 10.8 K/uL    RBC 4.74 4.70 - 6.10 M/uL    Hemoglobin 14.6 14.0 - 18.0 g/dL    Hematocrit 43.2 42.0 - 52.0 %    MCV 91.1 81.4 - 97.8 fL    MCH 30.8 27.0 - 33.0 pg    MCHC 33.8 32.3 - 36.5 g/dL    RDW 44.2 35.9 - 50.0 fL    Platelet Count 246 164 - 446 K/uL    MPV 9.6 9.0 - 12.9 fL   Comp Metabolic Panel    Collection Time: 04/01/25  5:54 AM   Result Value Ref Range    Sodium 135 135 - 145 mmol/L    Potassium 4.4 3.6 - 5.5 mmol/L    Chloride 104 96 - 112 mmol/L    Co2 21 20 - 33 mmol/L    Anion Gap 10.0 7.0 - 16.0    Glucose 99 65 - 99 mg/dL    Bun 9 8 - 22 mg/dL    Creatinine 0.71 0.50 - 1.40 mg/dL    Calcium 8.5 8.5 - 10.5 mg/dL    Correct Calcium 8.9 8.5 - 10.5 mg/dL    AST(SGOT) 24 12 - 45 U/L    ALT(SGPT) 48 2 - 50 U/L    Alkaline Phosphatase 103 (H) 30 - 99 U/L    Total Bilirubin 0.5 0.1 - 1.5 mg/dL    Albumin 3.5 3.2 - 4.9 g/dL    Total Protein 6.1 6.0 - 8.2 g/dL    Globulin 2.6 1.9 - 3.5 g/dL    A-G Ratio 1.3 g/dL   ESTIMATED GFR    Collection Time: " 04/01/25  5:54 AM   Result Value Ref Range    GFR (CKD-EPI) 95 >60 mL/min/1.73 m 2       Medications:  Scheduled Medications   Medication Dose Frequency    donepezil  5 mg DAILY    Pharmacy Consult Request  1 Each PHARMACY TO DOSE    senna-docusate  2 Tablet Q EVENING    omeprazole  20 mg DAILY    levETIRAcetam  500 mg BID     PRN medications: acetaminophen, Respiratory Therapy Consult, hydrALAZINE, senna-docusate **AND** polyethylene glycol/lytes, ondansetron **OR** ondansetron, traZODone, sodium chloride, butalbital/apap/caffeine, oxyCODONE immediate-release    Diet:  Current Diet Order   Procedures    Diet Order Diet: Regular (T-dine, meats cut up)       Medical Decision Making and Plan:  CVA:  Visual Agnosia  Right hemianopsia  Impaired attention  - Patient with L hemorraghic stroke side/type of stroke on 3/16 date. Etiology likely hypertensive. Received no intervention  -on keppra 500mg BID  -EKG today show no qtc  -routine ct head to monitor progression of IPH, stable progression     Bilateral lower extremity edema  -3/25 - dopplers to rule out blood clot  -continue ambulation as much a possible  -elevate legs in bed     Leukocytosis -resolved  -likely due to prednisone     Cognitive Communicative deficits:  - Patient with significant cognitive deficits due to aphasia  - Environmental modifications to decrease excessive stimulation including turning off the lights  - Consider starting neurostimulants such Nuvigil, amantadine or methylphenidate  - SLP to evaluate and treat cognitive deficits.   -Neuropsych will follow and perform testing if/when appropriate.  3/31- start aricept 5mg daily  -continue aricept 5mg daily x 3 weeks then increase to aricept 10mg daily        Hemiparesis:  - Patient with R dom hemiparesis  - consider starting SSRI per FLAME trial to facilitate post-stroke motor recovery  - Continue aggressive therapies with PT and OT to strengthen and optimize function.     Spasticity:  - Patient at  risk of tone in the affected limbs/muscles  - PT and OT to initiate aggressive stretching, ROM exercises, bracing, splinting, casting and positioning as appropriate.  - If the tone fails to improve with the above conservative measures, consider further intervention with botulinum toxin or phenol injections.     Dysphagia/Nutrition:  - Patient currently on dysphagia DIET.  - Continue therapies with SLP. SLP to perform swallow evaluation and provide oral motor exercises if necessary.        Neurogenic bladder:  - Timed voids with PVR q4H x3. If PVR > 400mL or if patient is unable to void, straight cath patient.     Neurogenic bowel:  -  Colace, Senna BID on admission  - Goal of 1BM/day.  -Last BM: 03/20/25 (Per report)        Circadian Rhythm disorder:   -Trazadone 50mg PRN for restlessness after 10pm  Recommend lights on during the day/off at night, minimize nighttime interruptions as able.     Mood  - at risk of adjustment disorder, depression, and anxiety due to functional decline     ID:  - at risk for Urinary tract infection     Skin/Wounds:  - Pressure relief q2h while in bed. Close monitoring for signs of breakdown     Pain:  - Neuroceptic - On Tylenol prn, fiorecet, prednisone x 4 days completed. Continue tylenol     DVT prophylaxis:  continues to be contraindicated. Dopplers negative 3/27     GI prophylaxis:  On Prilosec 20mg daily         -Follow-up Neurology, PCP    ____________________________________    Vivek Klein MD  Physical Medicine & Rehabilitation   Brain Injury Medicine   ____________________________________

## 2025-04-02 NOTE — CARE PLAN
The patient is Stable - Low risk of patient condition declining or worsening    Shift Goals  Clinical Goals: safety  Patient Goals: rest, go home thursday  Family Goals: no family present    Progress made toward(s) clinical / shift goals:    Problem: Fall Risk - Rehab  Goal: Patient will remain free from falls  Outcome: Progressing     Problem: Bladder / Voiding  Goal: Patient will establish and maintain regular urinary output  Outcome: Progressing     Problem: Pain - Standard  Goal: Alleviation of pain or a reduction in pain to the patient’s comfort goal  Outcome: Progressing

## 2025-04-02 NOTE — THERAPY
Physical Therapy   Discharge Summary     Patient Name:  Lico Caba  Age:  74 y.o., Sex:  male  Medical Record #:  [id number]  Today's Date: 4/2/2025     Precautions  Precautions: Fall Risk, Cognition/Communication  Fall Risk: Poor balance  Cognition/Communication: Expressive Aphasia  Swallow: Therapeutic dining  Comments: R Visual field cut    Subjective: Feeling more confident. Spouse and pt confirm ready for discharge tomorrow    Objective:    04/02/25 0830   PT Charge Group   PT Gait Training (Units) 1   PT Neuromuscular Re-Education / Balance (Units) 1   PT Therapeutic Activities (Units) 2   PT Total Time Spent   PT Individual Total Time Spent (Mins) 60   Roll Left and Right   Assistance Needed Independent   CARE Score - Roll Left and Right 6   Roll Left and Right   Level of Assist Independent   Sit to Lying   Assistance Needed Independent   CARE Score - Sit to Lying 6   Sit to Lying   Level of Assist Independent   Lying to Sitting on Side of Bed   Assistance Needed Independent   CARE Score - Lying to Sitting on Side of Bed 6   Lying to Sitting on Side of Bed   Level of Assist Independent   Sit to Stand   Assistance Needed Independent   CARE Score - Sit to Stand 6   Sit to Stand   Level of Assist Independent   Chair/Bed-to-Chair Transfer   Assistance Needed Independent   CARE Score - Chair/Bed-to-Chair Transfer 6   Chair/Bed-to-Chair Transfer   Level of Assist Independent   Toilet Transfer   Assistance Needed Set-up / clean-up   CARE Score - Toilet Transfer 5   Toilet Transfer   Level of Assist Set up   Toileting Hygiene   Assistance Needed Set-up / clean-up   CARE Score - Toileting Hygiene 5   Toileting Hygiene   Level of Assist Set up   Car Transfer   Assistance Needed Independent   CARE Score - Car Transfer 6   Car Transfer   Level of Assist Independent   Walk 10 Feet   Assistance Needed Independent   CARE Score - Walk 10 Feet 6   Walk 50 Feet with Two Turns   Assistance Needed Verbal cues   CARE  "Score - Walk 50 Feet with Two Turns 4   Walk 150 Feet   Assistance Needed Verbal cues   CARE Score - Walk 150 Feet 4   Walking 10 Feet on Uneven Surfaces   Assistance Needed Independent   CARE Score - Walking 10 Feet on Uneven Surfaces 6   Ambulation   Level of Assist Supervised  (verbal cueing for path finding with longer distances and to avoid objects to the right)   Distance 1000   1 Step (Curb)   Assistance Needed Independent   CARE Score - 1 Step (Curb) 6   Curbs   Level of Assist Independent   4 Steps   Assistance Needed Independent   CARE Score - 4 Steps 6   12 Steps   Assistance Needed Independent   CARE Score - 12 Steps 6   Stairs   Level of Assist Independent   Stair Height 6\" step   Additional Description L handrail;Step through pattern   Stairs Amount 12   Picking Up Object   Assistance Needed Independent   CARE Score - Picking Up Object 6   Wheel 50 Feet with Two Turns   Reason if not Attempted Activity not applicable   CARE Score - Wheel 50 Feet with Two Turns 9   Wheel 150 Feet   Reason if not Attempted Activity not applicable   CARE Score - Wheel 150 Feet 9   Interdisciplinary Plan of Care Collaboration   IDT Collaboration with  Family / Caregiver   Patient Position at End of Therapy Seated;Chair Alarm On;Self Releasing Lap Belt Applied;Call Light within Reach;Tray Table within Reach;Phone within Reach   Collaboration Comments HEP- walking program with simple dual task and environmental scanning to the right   PT Discharge Summary   Discharge Location Home   Patient Discharging with Assist of Spouse / Significant Other   Level of Supervision Required Upon Discharge Twenty Four Hour Supervision   Recommended Equipment for Discharge None   Recommeded Services Upon Discharge Home Health Physical Therapy   Long Term Goals Met 3   Long Term Goals Not Met 1   Reason(s) for Goals Not Met moderate verbal cueing required during community reintegration outing   Criteria for Termination of Services Maximum " Function Achieved for Inpatient Rehabilitation   Discharge Instructions to Patient   Level of Assist Required for Ambulation Supervision on Flat Surfaces;Supervision on Curbs;Supervision on Stairs   Device Recommended for Ambulation None   Home Exercise Program   (walking program with simple dual task and environmental scanning to the right)       Discharge Interventions  Purpose: to complete discharge assessments in preparation for upcoming discharge from facility and to review final discharge recommendations and education  Interventions:   completed discharge IRF-RITESH items  reviewed relevant DME and adaptive equipment recommendations  discussed home safety  discussed floor recovery/fall prevention  reviewed HEP with handout provided  reviewed relevant precautions to maximize safety during home and community mobility, ADLs, and IADLs    Reassessed BITS bell cancellation and maze assessments  Garcia cancellation 1st trial - 5:51 to complete 4 misses 7 distractors hit  2nd trial - 5:34 to complete 1 miss 0 distractors hit    Maze 1st trial- 3:04 to complete 0 errors  2nd trial- 1:50 to complete 2 errors      Assessment: Lico Farraradilenevidalcatalina has participated well in their inpatient rehabilitation program with functional gains as above in flowsheet. They are now appropriate for discharge to home with family support.      Strengths: Adequate strength, Independent prior level of function, Making steady progress towards goals, Motivated for self care and independence, Pleasant and cooperative, Supportive family, Willingly participates in therapeutic activities  Barriers: Aphasia expressive, Aphasia receptive, Hearing impairment, Impaired balance, Impaired insight/denial of deficits, Impaired functional cognition, Visual impairment    Plan:   DC home with spouse to provide 24hr SPV initially and HHPT    DME    PT DME Recommendations  Assistive Device:  (none anticipated)        PT Care Plan (Active)       There are no  active problems.

## 2025-04-02 NOTE — THERAPY
Occupational Therapy  Daily Treatment     Patient Name:  Lico Caba  Age:  74 y.o., Sex:  male  Medical Record #:  [id number]  Today's Date:  4/2/2025    Precautions  Precautions: Fall Risk, Cognition/Communication  Fall Risk: Poor balance  Cognition/Communication: Expressive Aphasia, Receptive Aphasia  Swallow: Therapeutic dining  Comments: R Visual field cut    Subjective: Pt seated in w/c upon arrival, pleasant and cooperative, agreeable to therapy.     Objective:    04/02/25 1231   OT Charge Group   OT Therapy Activity (Units) 2   OT Therapeutic Exercise (Units) 2   OT Total Time Spent   OT Individual Total Time Spent (Mins) 60   Eating   Assistance Needed Set-up / clean-up   CARE Score - Eating 5   Oral Hygiene   Assistance Needed Set-up / clean-up   CARE Score - Oral Hygiene 5   Upper Body Dressing   Assistance Needed Set-up / clean-up   CARE Score - Upper Body Dressing 5   Lower Body Dressing   Assistance Needed Set-up / clean-up   CARE Score - Lower Body Dressing 5   Putting On/Taking Off Footwear   Assistance Needed Set-up / clean-up   CARE Score - Putting On/Taking Off Footwear 5   Shower/Bathe Self   Assistance Needed Set-up / clean-up   CARE Score - Shower/Bathe Self 5   Toilet Transfer   Assistance Needed Set-up / clean-up   CARE Score - Toilet Transfer 5   Toileting Hygiene   Assistance Needed Set-up / clean-up   CARE Score - Toileting Hygiene 5   Interdisciplinary Plan of Care Collaboration   Patient Position at End of Therapy Seated;Family / Friend in Room   OT Discharge Summary    Discharge Location  Home   Patient Discharging with Assist of Spouse / Significant Other   Level of Supervision Required 24 Hour Supervision   Recommended Equipment for Discharge Grab Bars in Tub / Shower;Grab Bars by Toilet;Shower Chair   Recommended Services Upon Discharge Home Health Occupational Therapy   Long Term Goals Met 2   Long Term Goals Not Met 0   Criteria for Termination of Services Maximum  Function Achieved for Inpatient Rehabilitation   Discharge Instructions to Patient   Level of Assist Required for Eating Requires Supervision with Eating   Level of Assist Required for Grooming Requires Supervision with Grooming   Level of Assist Required for Dressing Requires Supervision with Dressing   Level of Assist Required for Toileting Requires Supervision with Toileting   Level of Assist Required for Toilet Transfer Requires Supervision with Toilet Transfer   Equipment for Toilet Transfer Grab Bars by Toilet   Level of Assist Required for Bathing Requires Supervision with Bathing   Equipment for Bathing Shower Chair;Grab Bars in Tub / Shower;Hand Held Shower Head   Level of Assist Required for Shower Transfer Requires Supervision with Shower Transfer   Equipment for Shower Transfer Grab Bars in Tub / Shower;Shower Chair   Level of Assist Required for Home Mgmt Requires Supervision with Home Management   Level of Assist Required for Meal Prep Requires Supervision with Meal Preparation   Driving May not Drive, Please Contact Physician for Further Information   Home Exercise Program Refer to Home Exercise Program Handout for Details     Therapeutic Activities  Purpose: to improve performance and function of daily activities, to provide patient and family education, and to increase safety with activities of daily life and mobility related to activities of daily life  Interventions:  Standing PVC tree pipe mild complexity design - completed in 15 min with 100% accuracy, no assist required  Standing visual saccades: using laser pointer at wall to locate numbers (as he was unable to verbalize correct numbers). Requires significant increase in time and Min cues to locate correct number    Therapeutic Exercises: strength/endurance  Interventions:  Standing equalizer: x100 rows, no RB required  Standing UBE hydrocycle: level 1, 5 min, 0 RB    Assessment:    Pt with improve standing balance, can complete most basic  ADL/IADL tasks with no physical assist but requires supervision for overall safety awareness d/t impaired vision, cognition, and communication. Pt and spouse with no further questions/concerns re: d/c tomorrow and home safety     Strengths: Independent prior level of function, Making steady progress towards goals, Motivated for self care and independence, Pleasant and cooperative, Supportive family, Willingly participates in therapeutic activities  Barriers: Aphasia expressive, Aphasia receptive, Confused, Decreased endurance, Difficulty following instructions, Fatigue, Generalized weakness, Impaired activity tolerance, Impaired balance, Impaired carryover of learning, Impaired insight/denial of deficits, Impaired functional cognition, Impulsive, Limited mobility, Visual impairment    Plan:  D/c tomorrow     Occupational Therapy Goals (Active)       There are no active problems.

## 2025-04-03 VITALS
TEMPERATURE: 97.8 F | SYSTOLIC BLOOD PRESSURE: 100 MMHG | BODY MASS INDEX: 23.37 KG/M2 | HEIGHT: 73 IN | OXYGEN SATURATION: 92 % | DIASTOLIC BLOOD PRESSURE: 62 MMHG | RESPIRATION RATE: 18 BRPM | HEART RATE: 98 BPM | WEIGHT: 176.37 LBS

## 2025-04-03 PROCEDURE — 700102 HCHG RX REV CODE 250 W/ 637 OVERRIDE(OP): Performed by: PHYSICAL MEDICINE & REHABILITATION

## 2025-04-03 PROCEDURE — A9270 NON-COVERED ITEM OR SERVICE: HCPCS | Performed by: PHYSICAL MEDICINE & REHABILITATION

## 2025-04-03 PROCEDURE — 99239 HOSP IP/OBS DSCHRG MGMT >30: CPT | Performed by: PHYSICAL MEDICINE & REHABILITATION

## 2025-04-03 RX ADMIN — DONEPEZIL HYDROCHLORIDE 5 MG: 5 TABLET, FILM COATED ORAL at 08:02

## 2025-04-03 RX ADMIN — OMEPRAZOLE 20 MG: 20 CAPSULE, DELAYED RELEASE ORAL at 08:02

## 2025-04-03 RX ADMIN — LEVETIRACETAM 500 MG: 500 TABLET, FILM COATED ORAL at 08:02

## 2025-04-03 NOTE — CARE PLAN
The patient is Stable - Low risk of patient condition declining or worsening    Shift Goals  Clinical Goals: safety  Patient Goals: discharge home tomorrow, rest  Family Goals: no family present    Progress made toward(s) clinical / shift goals:    Problem: Fall Risk - Rehab  Goal: Patient will remain free from falls  Outcome: Progressing     Problem: Bladder / Voiding  Goal: Patient will establish and maintain regular urinary output  Outcome: Progressing     Problem: Skin Integrity  Goal: Patient's skin integrity will be maintained or improve  Outcome: Progressing     Problem: Self Care  Goal: Patient will have the ability to perform ADLs independently or with assistance  Outcome: Progressing     Problem: Pain - Standard  Goal: Alleviation of pain or a reduction in pain to the patient’s comfort goal  Outcome: Progressing

## 2025-04-03 NOTE — PROGRESS NOTES
NURSING DAILY NOTE    Name: Lico Caba   Date of Admission: 3/21/2025   Admitting Diagnosis: Intracranial hemorrhage, spontaneous intraparenchymal, idiopathic, acute ([facility])  Attending Physician: SAVITA GOODMAN M.D.  Allergies: Patient has no known allergies.    Safety  Patient Assist  spouse is cleared  Patient Precautions  Precautions: Fall Risk, Cognition/Communication  Fall Risk: Poor balance  Cognition/Communication: Expressive Aphasia, Receptive Aphasia  Swallow: Therapeutic dining  Comments: R Visual field cut  Bed Transfer Status  Independent  Toilet Transfer Status   Set up  Assistive Devices  Rails, Wheelchair, Wheelchair push  Oxygen  None - Room Air  Diet/Therapeutic Dining  Current Diet Order   Procedures    Diet Order Diet: Regular (T-dine, meats cut up)     Pill Administration  whole  Agitated Behavioral Scale  17  ABS Level of Severity  No Agitation    Fall Risk  Has the patient had a fall this admission?      Genesis Elizondo Fall Risk Scoring  18, HIGH RISK  Fall Risk Safety Measures  bed alarm, chair alarm, poor balance, and low vision/hearing    Vitals  Temperature: 36.2 °C (97.1 °F)  Temp src: Temporal  Pulse: 63  Respiration: 18  Blood Pressure : 130/74  Blood Pressure MAP (Calculated): 93 MM HG  BP Location: Right, Upper Arm  Patient BP Position: Supine     Oxygen  Pulse Oximetry: 90 %  O2 (LPM): 0  O2 Delivery Device: None - Room Air    Bowel and Bladder  Last Bowel Movement  04/02/25  Stool Type  Type 4: Like a sausage or snake, smooth and soft  Bowel Device  Bathroom  Continent  Bladder: continent   Bowel: continent   Bladder Function  Urine Void (mL):  (large)  Number of Times Voided: 1  Urine Color: Yellow  Number of Times Incontinent of Urine: 0  Genitourinary Assessment   Bladder Assessment (WDL):  Within Defined Limits  Huston Catheter: Not Applicable  Urine Color: Yellow  Number of Bladder Accidents: 0  Total Number of Bladder of Accidents in Last 7 Days: 0  Number of Times  Incontinent of Urine: 0  Bladder Device: Bathroom  Time Void: Yes  Bladder Scan: Post Void  $ Bladder Scan Results (mL): 34    Skin  Dereje Score   19  Sensory Interventions   Bed Types: Standard/Trauma Mattress  Skin Preventative Measures: Pillows in Use for Support / Positioning  Moisture Interventions         Pain  Pain Rating Scale  0 - No Pain  Pain Location  Head  Pain Location Orientation  Anterior  Pain Interventions   Declines    ADLs    Bathing   Shower, Family / Significant Other  Linen Change   Complete  Personal Hygiene  Moist Marcie Wipes  Chlorhexidine Bath      Oral Care  Brushed Teeth  Teeth/Dentures     Shave  Full Assist (spouse)  Nutrition Percentage Eaten  *  * Meal *  *, Dinner, Between 25-50% Consumed  Environmental Precautions  Treaded Slipper Socks on Patient, Personal Belongings, Wastebasket, Call Bell etc. in Easy Reach, Bed in Low Position  Patient Turns/Positioning  Patient turns self independently side to side without assistance, to offload sacral area  Patient Turns Assistance/Tolerance  Standby Assist  Bed Positions  Bed Controls On, Bed Locked  Head of Bed Elevated  Self regulated      Psychosocial/Neurologic Assessment  Psychosocial Assessment  Psychosocial (WDL):  WDL Except  Patient Behaviors: Confused  Family Behaviors: No Family Present  Neurologic Assessment  Neuro (WDL): Exceptions to WDL  Level of Consciousness: Alert  Orientation Level: Disoriented to time  Cognition: Follows commands, Appropriate safety awareness, Confused in conversation  Speech: Clear  Facial Symmetry: Right facial drooping  Pupil Assesment: No  Motor Function/Sensation Assessment: Motor strength  Muscle Strength Right Arm: Good Strength Against Gravity and Moderate Resistance  Muscle Strength Left Arm: Good Strength Against Gravity and Moderate Resistance  Muscle Strength Right Leg: Good Strength Against Gravity and Moderate Resistance  Muscle Strength Left Leg: Good Strength Against Gravity and Moderate  Resistance  EENT (WDL):  WDL Except    Cardio/Pulmonary Assessment  Edema      Respiratory Breath Sounds  RUL Breath Sounds: Clear  RML Breath Sounds: Clear  RLL Breath Sounds: Diminished  ZACH Breath Sounds: Clear  LLL Breath Sounds: Diminished  Cardiac Assessment   Cardiac (WDL):  Within Defined Limits

## 2025-04-03 NOTE — DISCHARGE SUMMARY
Physical Medicine & Rehabilitation Discharge Summary    Admission Date: 3/21/2025    Discharge Date: 4/3/2025    Attending Provider: Vivek Klein MD    Admission Diagnosis:   Active Hospital Problems    Diagnosis     *Intracranial hemorrhage, spontaneous intraparenchymal, idiopathic, acute (HCC)        Discharge Diagnosis:  Active Hospital Problems    Diagnosis     *Intracranial hemorrhage, spontaneous intraparenchymal, idiopathic, acute (HCC)        HPI per Admission History & Physical:  73 y/o male with no pmh presented 3/16/25 with aphasia, Headache. 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 No surgical intervension. Continue keppra for seizure prohalaxis. On prednisone for headache. Will need followup MRI to rule out AVM.       Patient was admitted to [facility] on 3/21/2025.     Hospital Course by Problem List:  CVA:  Visual Agnosia  Right hemianopsia  Impaired attention  - Patient with L hemorraghic stroke side/type of stroke on 3/16 date. Etiology likely hypertensive. Received no intervention  -on keppra 500mg BID  -routine ct head to monitor progression of IPH, stable progression  -[facility]     Cognitive Communicative deficits:  - Patient with significant cognitive deficits due to aphasia  - Environmental modifications to decrease excessive stimulation including turning off the lights  - SLP to evaluate and treat cognitive deficits.   -Neuropsych will follow and perform testing if/when appropriate.  -continue aricept 5mg daily x 3 weeks then increase to aricept 10mg daily        Hemiparesis:  - Patient with R dom hemiparesis  - Continue aggressive therapies with PT and OT to strengthen and optimize function.      Circadian Rhythm disorder:   -Trazadone 50mg PRN for restlessness after 10pm  Recommend lights on during the day/off at night, minimize nighttime interruptions as able.     Mood  - at risk of adjustment disorder, depression, and anxiety due to  functional decline     ID:  - at risk for Urinary tract infection     Skin/Wounds:  - Pressure relief q2h while in bed. Close monitoring for signs of breakdown     Pain:  - Neuroceptic - On Tylenol prn    DVT prophylaxis:  Dopplers negative 3/27, ambulating 300 ft     GI prophylaxis:  On Prilosec 20mg daily         -Follow-up Neurology, PCP    Functional Status at Discharge  Eating Description:  Set Up, Supervised, Moderate Assist  Oral Hygiene Description:  Minimal Assist  Grooming Description:  Supervised  Shower/Bathe Self Description:  Stand By Assist  Upper Body Dressing Description:  Stand by Assist  Lower Body Dressing Description:  Stand by Assist  Putting on/Taking Off Footwear Description:  Stand by Assist  Toileting Hygiene Description:  Set up  Toilet Transfer Description:  Set up  Tub/Shower Transfer Description:  Contact Guard Assist  Discharge Location : Home  Patient Discharging with Assist of: Spouse / Significant Other  Level of Supervision Required: 24 Hour Supervision  Recommended Equipment for Discharge: Grab Bars in Tub / Shower;Grab Bars by Toilet;Shower Chair  Recommended Services Upon Discharge: Home Health Occupational Therapy  Long Term Goals Met: 2  Long Term Goals Not Met: 0  Criteria for Termination of Services: Maximum Function Achieved for Inpatient Rehabilitation  Roll Left and Right Description:  Independent  Sit to Lying Description:  Independent  Lying to Sitting Description:  Independent  Sit to Stand Description:  Independent  Chair/Bed-to-Chair Transfer Description:  Independent  Car Transfer Description:  Independent  Walking Description:  Supervised (verbal cueing for path finding with longer distances and to avoid objects to the right)  Walking Distance:  1000  Curbs Description:    Stairs Description:  Independent  Stairs Amount:  12  Wheelchair Description:  Supervised  Wheelchair Distance:      Wheelchair Description:  Level of Assist: Supervised  Type: Manual  Additional  Description: Cueing needed  Wheelchair Distance: 150  Discharge Location: Home  Patient Discharging with Assist of: Spouse / Significant Other  Level of Supervision Required Upon Discharge: Twenty Four Hour Supervision  Recommended Equipment for Discharge: None  Recommeded Services Upon Discharge: Home Health Physical Therapy  Long Term Goals Met: 3  Long Term Goals Not Met: 1  Reason(s) for Goals Not Met: moderate verbal cueing required during community reintegration outing  Criteria for Termination of Services: Maximum Function Achieved for Inpatient Rehabilitation  Comprehension Level of Assist:  Minimal Assist  Comprehension Description:  Cueing needed, Extra time needed  Expression Level of Assist:  Minimal Assist  Expression Description:  Cueing needed, Extra time needed  Social Interaction Level of Assist:  Modified Independent  Social Interaction Description:  Extra time needed  Problem Solving Level of Assist:  Moderate Assist, Minimal Assist  Problem Solving Description:  Cueing needed, Extra time needed  Memory Level of Assist:  Minimal Assist, Moderate Assist  Memory Description:  Cueing needed, Extra time needed  Discharge Location : Home  Patient Discharging with Assist of: Spouse / Significant Other  Level of Supervision Required: Intermittent Supervision  Recommended Services Upon Discharge: Home Health Speech Therapy  Long Term Goals Met: 1  Long Term Goals Not Met: 1  Reason(s) for Goals Not Met: Goal not targeted.  Criteria for Termination of Services: Maximum Function Achieved for Inpatient Rehabilitation  Comments: Assistance with meds/finances    I, Vivek Klein M.D., personally performed a complete drug regimen review and no potential clinically significant medication issues were identified.   Discharge Medication:     Medication List        START taking these medications        Instructions   acetaminophen 500 MG Tabs  Commonly known as: Tylenol  Notes to patient: pain   Take 2 Tablets by  mouth every 6 hours as needed for Mild Pain.  Dose: 1,000 mg     donepezil 5 MG Tabs  Commonly known as: Aricept  Notes to patient: Memory    Take 1 Tablet by mouth every day. Take 1 tab per day for 3 weeks, then increase to 2 tabs per day  Dose: 5 mg     levETIRAcetam 500 MG Tabs  Commonly known as: Keppra  Notes to patient: Seizure prevention    Take 1 Tablet by mouth 2 times a day.  Dose: 500 mg     omeprazole 20 MG delayed-release capsule  Commonly known as: PriLOSEC  Notes to patient: GERD   Take 1 Capsule by mouth every day.  Dose: 20 mg     traZODone 50 MG Tabs  Commonly known as: Desyrel  Notes to patient: Sleep   Take 1 Tablet by mouth at bedtime as needed for Sleep.  Dose: 50 mg            STOP taking these medications      cephALEXin 500 MG Caps  Commonly known as: Keflex     FIBER PO     tizanidine 2 MG tablet  Commonly known as: Zanaflex              Discharge Diet:  No Active Diet Orders      Discharge Activity:  As tolerated     Disposition:  Patient to discharge home with family support and community resources.    Equipment:  none    Follow-up & Discharge Instructions:  Follow up with your primary care provider (PCP) within 7-10 days of discharge to review your medications and take over your care.     If you develop chest pain, fever, chills, change in neurologic function (weakness, sensation changes, vision changes), or other concerning sxs, seek immediate medical attention or call 911.      No future appointments.    Condition on Discharge:  Good    More than 35 minutes was spent on discharging this patient, including face-to-face time, prescription management, and the dictation of this note.    ____________________________________     Vivek Klein MD  Physical Medicine & Rehabilitation   Brain Injury Medicine   ____________________________________    Date of Service: 4/3/2025

## 2025-04-03 NOTE — PROGRESS NOTES
Patient discharged to home per order.  Discharge instructions reviewed with patient and spouse; they verbalize understanding and signed copies placed in chart.  Patient has all belongings; signed copy of form in chart.  Patient left facility at 1000 via wheelchair accompanied by rehab staff and spouse.  Have enjoyed working with this pleasant patient.

## 2025-04-03 NOTE — PROGRESS NOTES
NURSING DAILY NOTE    Name: Lico Caba  Date of Admission: 3/21/2025  Admitting Diagnosis: Intracranial hemorrhage, spontaneous intraparenchymal, idiopathic, acute (HCC)  Attending Physician: iVvek Klein M.d.  Allergies: Patient has no known allergies.    Safety  Patient Assist  oSBA to CGA  Patient Precautions  o   Precaution Comments  o   Bed Transfer Status  o   Toilet Transfer Status  o   Assistive Devices  oRails, Wheelchair  Oxygen  oNone - Room Air  Diet/Therapeutic Dining  o  Current Diet Order   Procedures    Diet Order Diet: Regular (T-dine, meats cut up)     Pill Administration  owhole  Agitated Behavioral Scale  o17  ABS Level of Severity  Deann Agitation    Fall Risk  Has the patient had a fall this admission?  Deann  Genesis Elizondo Fall Risk Scoring  o18, HIGH RISK  Fall Risk Safety Measures  chadd alarm, chair alarm, and seatbelt alarm    Vitals  Temperature: 36.6 °C (97.8 °F)  Temp src: Oral  Pulse: 98  Respiration: 18  Blood Pressure : 100/62  Blood Pressure MAP (Calculated): 75 MM HG  BP Location: Right, Upper Arm  Patient BP Position: Sitting    Oxygen  Pulse Oximetry: 92 %  O2 (LPM): 0  O2 Delivery Device: None - Room Air    Bowel and Bladder  Last Bowel Movement  o04/02/25  Stool Type  oType 4: Like a sausage or snake, smooth and soft  Bowel Device  oBathroom  Continent  oBladder: Did not void  oBowel: No movement  Bladder Function  oUrine Void (mL):  (large)  Number of Times Voided: 1  Urine Color: Yellow  Number of Times Incontinent of Urine: 0  Genitourinary Assessment  oBladder Assessment (WDL):  Within Defined Limits  Huston Catheter: Not Applicable  Urine Color: Yellow  Number of Bladder Accidents: 0  Total Number of Bladder of Accidents in Last 7 Days: 0  Number of Times Incontinent of Urine: 0  Bladder Device: Bathroom  Time Void: Yes  Bladder Scan: Post Void  $ Bladder Scan Results (mL): 34    Skin  Dereje Score  o 19  Sensory Interventions  o Bed Types: Standard/Trauma  Mattress  Skin Preventative Measures: Pillows in Use for Support / Positioning  Moisture Interventions  o       Pain  Pain Rating Scale  o3 - Sometimes distracts me  Pain Location  oHead  Pain Location Orientation  oAnterior  Pain Interventions  oMedication (see MAR), Rest    ADLs    Bathing  oShower, Family / Significant Other  Linen Change  oComplete  Personal Hygiene  oMoist Marcie Wipes  Chlorhexidine Bath  o   Oral Care  oBrushed Teeth  Teeth/Dentures  o   Shave  oFull Assist (spouse)  Nutrition Percentage Eaten  o*  * Meal *  *, Dinner, Between 25-50% Consumed  Environmental Precautions  oTreaded Slipper Socks on Patient, Personal Belongings, Wastebasket, Call Bell etc. in Easy Reach, Bed in Low Position  Patient Turns/Positioning  oPatient turns self independently side to side without assistance, to offload sacral area  Patient Turns Assistance/Tolerance  oStandby Assist  Bed Positions  María Controls On, Bed Locked  Head of Bed Elevated  oSelf regulated      Psychosocial/Neurologic Assessment  Psychosocial Assessment  oPsychosocial (WDL):  WDL Except  Patient Behaviors: Confused  Family Behaviors: No Family Present  Neurologic Assessment  oNeuro (WDL): Exceptions to WDL  Level of Consciousness: Alert  Orientation Level: Disoriented to time  Cognition: Follows commands, Appropriate safety awareness, Confused in conversation  Speech: Clear  Facial Symmetry: Right facial drooping  Pupil Assesment: No  Motor Function/Sensation Assessment: Motor strength  Muscle Strength Right Arm: Good Strength Against Gravity and Moderate Resistance  Muscle Strength Left Arm: Good Strength Against Gravity and Moderate Resistance  Muscle Strength Right Leg: Good Strength Against Gravity and Moderate Resistance  Muscle Strength Left Leg: Good Strength Against Gravity and Moderate Resistance  oEENT (WDL):  WDL Except    Cardio/Pulmonary Assessment  Edema  o   Respiratory Breath Sounds  oRUL Breath Sounds: Clear  RML Breath Sounds:  Clear  RLL Breath Sounds: Diminished  ZACH Breath Sounds: Clear  LLL Breath Sounds: Diminished  Cardiac Assessment  oCardiac (WDL):  Within Defined Limits

## 2025-08-10 ENCOUNTER — HOSPITAL ENCOUNTER (OUTPATIENT)
Dept: RADIOLOGY | Facility: MEDICAL CENTER | Age: 75
End: 2025-08-10

## 2025-08-10 ENCOUNTER — HOSPITAL ENCOUNTER (INPATIENT)
Facility: MEDICAL CENTER | Age: 75
LOS: 1 days | DRG: 066 | End: 2025-08-11
Attending: EMERGENCY MEDICINE | Admitting: STUDENT IN AN ORGANIZED HEALTH CARE EDUCATION/TRAINING PROGRAM
Payer: COMMERCIAL

## 2025-08-10 DIAGNOSIS — I60.9 SAH (SUBARACHNOID HEMORRHAGE) (HCC): ICD-10-CM

## 2025-08-10 DIAGNOSIS — R20.2 PARESTHESIAS: Primary | ICD-10-CM

## 2025-08-10 LAB
ALBUMIN SERPL BCP-MCNC: 3.8 G/DL (ref 3.2–4.9)
ALBUMIN/GLOB SERPL: 1.6 G/DL
ALP SERPL-CCNC: 71 U/L (ref 30–99)
ALT SERPL-CCNC: 16 U/L (ref 2–50)
ANION GAP SERPL CALC-SCNC: 10 MMOL/L (ref 7–16)
AST SERPL-CCNC: 19 U/L (ref 12–45)
BASOPHILS # BLD AUTO: 0.5 % (ref 0–1.8)
BASOPHILS # BLD: 0.04 K/UL (ref 0–0.12)
BILIRUB SERPL-MCNC: 0.3 MG/DL (ref 0.1–1.5)
BUN SERPL-MCNC: 17 MG/DL (ref 8–22)
CALCIUM ALBUM COR SERPL-MCNC: 8.8 MG/DL (ref 8.5–10.5)
CALCIUM SERPL-MCNC: 8.6 MG/DL (ref 8.5–10.5)
CHLORIDE SERPL-SCNC: 106 MMOL/L (ref 96–112)
CHOLEST SERPL-MCNC: 156 MG/DL (ref 100–199)
CO2 SERPL-SCNC: 22 MMOL/L (ref 20–33)
CREAT SERPL-MCNC: 0.85 MG/DL (ref 0.5–1.4)
EKG IMPRESSION: NORMAL
EOSINOPHIL # BLD AUTO: 0.25 K/UL (ref 0–0.51)
EOSINOPHIL NFR BLD: 3.3 % (ref 0–6.9)
ERYTHROCYTE [DISTWIDTH] IN BLOOD BY AUTOMATED COUNT: 45 FL (ref 35.9–50)
EST. AVERAGE GLUCOSE BLD GHB EST-MCNC: 114 MG/DL
GFR SERPLBLD CREATININE-BSD FMLA CKD-EPI: 90 ML/MIN/1.73 M 2
GLOBULIN SER CALC-MCNC: 2.4 G/DL (ref 1.9–3.5)
GLUCOSE SERPL-MCNC: 89 MG/DL (ref 65–99)
HBA1C MFR BLD: 5.6 % (ref 4–5.6)
HCT VFR BLD AUTO: 43.6 % (ref 42–52)
HDLC SERPL-MCNC: 52 MG/DL
HGB BLD-MCNC: 14.6 G/DL (ref 14–18)
IMM GRANULOCYTES # BLD AUTO: 0.02 K/UL (ref 0–0.11)
IMM GRANULOCYTES NFR BLD AUTO: 0.3 % (ref 0–0.9)
LDLC SERPL CALC-MCNC: 88 MG/DL
LYMPHOCYTES # BLD AUTO: 2.94 K/UL (ref 1–4.8)
LYMPHOCYTES NFR BLD: 38.2 % (ref 22–41)
MAGNESIUM SERPL-MCNC: 2.2 MG/DL (ref 1.5–2.5)
MCH RBC QN AUTO: 30.7 PG (ref 27–33)
MCHC RBC AUTO-ENTMCNC: 33.5 G/DL (ref 32.3–36.5)
MCV RBC AUTO: 91.6 FL (ref 81.4–97.8)
MONOCYTES # BLD AUTO: 0.74 K/UL (ref 0–0.85)
MONOCYTES NFR BLD AUTO: 9.6 % (ref 0–13.4)
NEUTROPHILS # BLD AUTO: 3.7 K/UL (ref 1.82–7.42)
NEUTROPHILS NFR BLD: 48.1 % (ref 44–72)
NRBC # BLD AUTO: 0 K/UL
NRBC BLD-RTO: 0 /100 WBC (ref 0–0.2)
PHOSPHATE SERPL-MCNC: 3.5 MG/DL (ref 2.5–4.5)
PLATELET # BLD AUTO: 214 K/UL (ref 164–446)
PMV BLD AUTO: 9.6 FL (ref 9–12.9)
POTASSIUM SERPL-SCNC: 4 MMOL/L (ref 3.6–5.5)
PROT SERPL-MCNC: 6.2 G/DL (ref 6–8.2)
RBC # BLD AUTO: 4.76 M/UL (ref 4.7–6.1)
SODIUM SERPL-SCNC: 138 MMOL/L (ref 135–145)
TRIGL SERPL-MCNC: 82 MG/DL (ref 0–149)
TROPONIN T SERPL-MCNC: 18 NG/L (ref 6–19)
WBC # BLD AUTO: 7.7 K/UL (ref 4.8–10.8)

## 2025-08-10 PROCEDURE — 80053 COMPREHEN METABOLIC PANEL: CPT

## 2025-08-10 PROCEDURE — 93005 ELECTROCARDIOGRAM TRACING: CPT | Mod: TC | Performed by: EMERGENCY MEDICINE

## 2025-08-10 PROCEDURE — 83735 ASSAY OF MAGNESIUM: CPT

## 2025-08-10 PROCEDURE — 84100 ASSAY OF PHOSPHORUS: CPT

## 2025-08-10 PROCEDURE — 84484 ASSAY OF TROPONIN QUANT: CPT

## 2025-08-10 PROCEDURE — 99291 CRITICAL CARE FIRST HOUR: CPT | Performed by: STUDENT IN AN ORGANIZED HEALTH CARE EDUCATION/TRAINING PROGRAM

## 2025-08-10 PROCEDURE — 83036 HEMOGLOBIN GLYCOSYLATED A1C: CPT

## 2025-08-10 PROCEDURE — 80061 LIPID PANEL: CPT

## 2025-08-10 PROCEDURE — 99291 CRITICAL CARE FIRST HOUR: CPT

## 2025-08-10 PROCEDURE — 770022 HCHG ROOM/CARE - ICU (200)

## 2025-08-10 PROCEDURE — 85025 COMPLETE CBC W/AUTO DIFF WBC: CPT

## 2025-08-10 PROCEDURE — 36415 COLL VENOUS BLD VENIPUNCTURE: CPT

## 2025-08-10 RX ORDER — ACETAMINOPHEN 325 MG/1
650 TABLET ORAL EVERY 6 HOURS PRN
Status: DISCONTINUED | OUTPATIENT
Start: 2025-08-10 | End: 2025-08-11 | Stop reason: HOSPADM

## 2025-08-10 RX ORDER — TRAZODONE HYDROCHLORIDE 50 MG/1
50 TABLET ORAL
Status: DISCONTINUED | OUTPATIENT
Start: 2025-08-10 | End: 2025-08-11

## 2025-08-10 RX ORDER — LABETALOL HYDROCHLORIDE 5 MG/ML
10 INJECTION, SOLUTION INTRAVENOUS EVERY 4 HOURS PRN
Status: DISCONTINUED | OUTPATIENT
Start: 2025-08-10 | End: 2025-08-11 | Stop reason: HOSPADM

## 2025-08-10 RX ORDER — AMOXICILLIN 250 MG
2 CAPSULE ORAL EVERY EVENING
Status: DISCONTINUED | OUTPATIENT
Start: 2025-08-11 | End: 2025-08-11 | Stop reason: HOSPADM

## 2025-08-10 RX ORDER — HYDRALAZINE HYDROCHLORIDE 20 MG/ML
10 INJECTION INTRAMUSCULAR; INTRAVENOUS EVERY 4 HOURS PRN
Status: DISCONTINUED | OUTPATIENT
Start: 2025-08-10 | End: 2025-08-11 | Stop reason: HOSPADM

## 2025-08-10 RX ORDER — OMEPRAZOLE 20 MG/1
20 CAPSULE, DELAYED RELEASE ORAL DAILY
Status: DISCONTINUED | OUTPATIENT
Start: 2025-08-11 | End: 2025-08-11

## 2025-08-10 RX ORDER — DONEPEZIL HYDROCHLORIDE 5 MG/1
5 TABLET, FILM COATED ORAL EVERY EVENING
Status: DISCONTINUED | OUTPATIENT
Start: 2025-08-11 | End: 2025-08-11 | Stop reason: HOSPADM

## 2025-08-10 RX ORDER — POLYETHYLENE GLYCOL 3350 17 G/17G
1 POWDER, FOR SOLUTION ORAL
Status: DISCONTINUED | OUTPATIENT
Start: 2025-08-10 | End: 2025-08-11 | Stop reason: HOSPADM

## 2025-08-10 ASSESSMENT — ENCOUNTER SYMPTOMS
HEADACHES: 0
ABDOMINAL PAIN: 0
EYES NEGATIVE: 1
FEVER: 0
SPUTUM PRODUCTION: 0
VOMITING: 0
SENSORY CHANGE: 1
PALPITATIONS: 0
WEAKNESS: 1
FOCAL WEAKNESS: 1
CHILLS: 0
MUSCULOSKELETAL NEGATIVE: 1
SORE THROAT: 0
NAUSEA: 0
SHORTNESS OF BREATH: 0

## 2025-08-10 ASSESSMENT — FIBROSIS 4 INDEX: FIB4 SCORE: 1.07

## 2025-08-11 ENCOUNTER — APPOINTMENT (OUTPATIENT)
Dept: RADIOLOGY | Facility: MEDICAL CENTER | Age: 75
DRG: 066 | End: 2025-08-11
Attending: STUDENT IN AN ORGANIZED HEALTH CARE EDUCATION/TRAINING PROGRAM
Payer: COMMERCIAL

## 2025-08-11 VITALS
OXYGEN SATURATION: 96 % | TEMPERATURE: 97.5 F | DIASTOLIC BLOOD PRESSURE: 57 MMHG | BODY MASS INDEX: 23.74 KG/M2 | RESPIRATION RATE: 18 BRPM | HEIGHT: 72 IN | HEART RATE: 56 BPM | SYSTOLIC BLOOD PRESSURE: 105 MMHG | WEIGHT: 175.27 LBS

## 2025-08-11 LAB
ALBUMIN SERPL BCP-MCNC: 3.8 G/DL (ref 3.2–4.9)
ALBUMIN/GLOB SERPL: 1.6 G/DL
ALP SERPL-CCNC: 73 U/L (ref 30–99)
ALT SERPL-CCNC: 13 U/L (ref 2–50)
ANION GAP SERPL CALC-SCNC: 10 MMOL/L (ref 7–16)
AST SERPL-CCNC: 20 U/L (ref 12–45)
BILIRUB SERPL-MCNC: 0.5 MG/DL (ref 0.1–1.5)
BUN SERPL-MCNC: 12 MG/DL (ref 8–22)
CALCIUM ALBUM COR SERPL-MCNC: 8.6 MG/DL (ref 8.5–10.5)
CALCIUM SERPL-MCNC: 8.4 MG/DL (ref 8.5–10.5)
CHLORIDE SERPL-SCNC: 109 MMOL/L (ref 96–112)
CO2 SERPL-SCNC: 21 MMOL/L (ref 20–33)
CREAT SERPL-MCNC: 0.7 MG/DL (ref 0.5–1.4)
ERYTHROCYTE [DISTWIDTH] IN BLOOD BY AUTOMATED COUNT: 43.5 FL (ref 35.9–50)
GFR SERPLBLD CREATININE-BSD FMLA CKD-EPI: 95 ML/MIN/1.73 M 2
GLOBULIN SER CALC-MCNC: 2.4 G/DL (ref 1.9–3.5)
GLUCOSE SERPL-MCNC: 108 MG/DL (ref 65–99)
HCT VFR BLD AUTO: 44.9 % (ref 42–52)
HGB BLD-MCNC: 15.5 G/DL (ref 14–18)
MAGNESIUM SERPL-MCNC: 2 MG/DL (ref 1.5–2.5)
MCH RBC QN AUTO: 30.6 PG (ref 27–33)
MCHC RBC AUTO-ENTMCNC: 34.5 G/DL (ref 32.3–36.5)
MCV RBC AUTO: 88.7 FL (ref 81.4–97.8)
PHOSPHATE SERPL-MCNC: 2.6 MG/DL (ref 2.5–4.5)
PLATELET # BLD AUTO: 226 K/UL (ref 164–446)
PMV BLD AUTO: 9.7 FL (ref 9–12.9)
POTASSIUM SERPL-SCNC: 3.6 MMOL/L (ref 3.6–5.5)
PROT SERPL-MCNC: 6.2 G/DL (ref 6–8.2)
RBC # BLD AUTO: 5.06 M/UL (ref 4.7–6.1)
SODIUM SERPL-SCNC: 140 MMOL/L (ref 135–145)
WBC # BLD AUTO: 7.1 K/UL (ref 4.8–10.8)

## 2025-08-11 PROCEDURE — 96365 THER/PROPH/DIAG IV INF INIT: CPT

## 2025-08-11 PROCEDURE — 700101 HCHG RX REV CODE 250: Performed by: STUDENT IN AN ORGANIZED HEALTH CARE EDUCATION/TRAINING PROGRAM

## 2025-08-11 PROCEDURE — 97162 PT EVAL MOD COMPLEX 30 MIN: CPT

## 2025-08-11 PROCEDURE — 83735 ASSAY OF MAGNESIUM: CPT

## 2025-08-11 PROCEDURE — 70553 MRI BRAIN STEM W/O & W/DYE: CPT

## 2025-08-11 PROCEDURE — 700111 HCHG RX REV CODE 636 W/ 250 OVERRIDE (IP): Mod: JZ | Performed by: STUDENT IN AN ORGANIZED HEALTH CARE EDUCATION/TRAINING PROGRAM

## 2025-08-11 PROCEDURE — 99239 HOSP IP/OBS DSCHRG MGMT >30: CPT | Performed by: HOSPITALIST

## 2025-08-11 PROCEDURE — 99222 1ST HOSP IP/OBS MODERATE 55: CPT | Performed by: NURSE PRACTITIONER

## 2025-08-11 PROCEDURE — 80053 COMPREHEN METABOLIC PANEL: CPT

## 2025-08-11 PROCEDURE — 96105 ASSESSMENT OF APHASIA: CPT

## 2025-08-11 PROCEDURE — 700117 HCHG RX CONTRAST REV CODE 255: Mod: JZ | Performed by: STUDENT IN AN ORGANIZED HEALTH CARE EDUCATION/TRAINING PROGRAM

## 2025-08-11 PROCEDURE — 84100 ASSAY OF PHOSPHORUS: CPT

## 2025-08-11 PROCEDURE — 700105 HCHG RX REV CODE 258: Performed by: STUDENT IN AN ORGANIZED HEALTH CARE EDUCATION/TRAINING PROGRAM

## 2025-08-11 PROCEDURE — 70544 MR ANGIOGRAPHY HEAD W/O DYE: CPT

## 2025-08-11 PROCEDURE — A9579 GAD-BASE MR CONTRAST NOS,1ML: HCPCS | Mod: JZ | Performed by: STUDENT IN AN ORGANIZED HEALTH CARE EDUCATION/TRAINING PROGRAM

## 2025-08-11 PROCEDURE — 700102 HCHG RX REV CODE 250 W/ 637 OVERRIDE(OP): Performed by: INTERNAL MEDICINE

## 2025-08-11 PROCEDURE — 85027 COMPLETE CBC AUTOMATED: CPT

## 2025-08-11 PROCEDURE — A9270 NON-COVERED ITEM OR SERVICE: HCPCS | Performed by: INTERNAL MEDICINE

## 2025-08-11 PROCEDURE — 97166 OT EVAL MOD COMPLEX 45 MIN: CPT

## 2025-08-11 RX ORDER — POTASSIUM CHLORIDE 1500 MG/1
60 TABLET, EXTENDED RELEASE ORAL ONCE
Status: COMPLETED | OUTPATIENT
Start: 2025-08-11 | End: 2025-08-11

## 2025-08-11 RX ORDER — GADOTERIDOL 279.3 MG/ML
15 INJECTION INTRAVENOUS ONCE
Status: COMPLETED | OUTPATIENT
Start: 2025-08-11 | End: 2025-08-11

## 2025-08-11 RX ADMIN — POTASSIUM CHLORIDE 60 MEQ: 1500 TABLET, EXTENDED RELEASE ORAL at 09:05

## 2025-08-11 RX ADMIN — GADOTERIDOL 15 ML: 279.3 INJECTION, SOLUTION INTRAVENOUS at 02:29

## 2025-08-11 RX ADMIN — HYDRALAZINE HYDROCHLORIDE 10 MG: 20 INJECTION, SOLUTION INTRAMUSCULAR; INTRAVENOUS at 02:00

## 2025-08-11 RX ADMIN — SODIUM CHLORIDE 2.5 MG/HR: 9 INJECTION, SOLUTION INTRAVENOUS at 00:20

## 2025-08-11 RX ADMIN — LABETALOL HYDROCHLORIDE 10 MG: 5 INJECTION, SOLUTION INTRAVENOUS at 03:03

## 2025-08-11 ASSESSMENT — LIFESTYLE VARIABLES
HOW MANY TIMES IN THE PAST YEAR HAVE YOU HAD 5 OR MORE DRINKS IN A DAY: 0
ON A TYPICAL DAY WHEN YOU DRINK ALCOHOL HOW MANY DRINKS DO YOU HAVE: 0
HAVE PEOPLE ANNOYED YOU BY CRITICIZING YOUR DRINKING: NO
AVERAGE NUMBER OF DAYS PER WEEK YOU HAVE A DRINK CONTAINING ALCOHOL: 0
HAVE YOU EVER FELT YOU SHOULD CUT DOWN ON YOUR DRINKING: NO
ALCOHOL_USE: NO
TOTAL SCORE: 0
DOES PATIENT WANT TO STOP DRINKING: NO
CONSUMPTION TOTAL: NEGATIVE
TOTAL SCORE: 0
TOTAL SCORE: 0
EVER FELT BAD OR GUILTY ABOUT YOUR DRINKING: NO
EVER HAD A DRINK FIRST THING IN THE MORNING TO STEADY YOUR NERVES TO GET RID OF A HANGOVER: NO

## 2025-08-11 ASSESSMENT — COGNITIVE AND FUNCTIONAL STATUS - GENERAL
SUGGESTED CMS G CODE MODIFIER DAILY ACTIVITY: CH
MOBILITY SCORE: 19
MOVING TO AND FROM BED TO CHAIR: A LITTLE
TOILETING: A LITTLE
DAILY ACTIVITIY SCORE: 24
DRESSING REGULAR LOWER BODY CLOTHING: A LITTLE
CLIMB 3 TO 5 STEPS WITH RAILING: A LITTLE
DAILY ACTIVITIY SCORE: 20
SUGGESTED CMS G CODE MODIFIER MOBILITY: CH
MOVING FROM LYING ON BACK TO SITTING ON SIDE OF FLAT BED: A LITTLE
WALKING IN HOSPITAL ROOM: A LITTLE
HELP NEEDED FOR BATHING: A LITTLE
TURNING FROM BACK TO SIDE WHILE IN FLAT BAD: A LITTLE
PERSONAL GROOMING: A LITTLE
SUGGESTED CMS G CODE MODIFIER DAILY ACTIVITY: CJ
MOBILITY SCORE: 24
SUGGESTED CMS G CODE MODIFIER MOBILITY: CK

## 2025-08-11 ASSESSMENT — GAIT ASSESSMENTS
GAIT LEVEL OF ASSIST: SUPERVISED
DISTANCE (FEET): 150

## 2025-08-11 ASSESSMENT — PAIN DESCRIPTION - PAIN TYPE
TYPE: ACUTE PAIN

## 2025-08-11 ASSESSMENT — ACTIVITIES OF DAILY LIVING (ADL): TOILETING: INDEPENDENT

## 2025-08-11 ASSESSMENT — FIBROSIS 4 INDEX: FIB4 SCORE: 1.69

## 2025-08-18 ENCOUNTER — TELEPHONE (OUTPATIENT)
Dept: NEUROLOGY | Facility: MEDICAL CENTER | Age: 75
End: 2025-08-18
Payer: COMMERCIAL

## 2025-08-26 ENCOUNTER — HOSPITAL ENCOUNTER (EMERGENCY)
Facility: MEDICAL CENTER | Age: 75
End: 2025-08-26
Attending: STUDENT IN AN ORGANIZED HEALTH CARE EDUCATION/TRAINING PROGRAM
Payer: COMMERCIAL

## 2025-08-26 ENCOUNTER — APPOINTMENT (OUTPATIENT)
Dept: RADIOLOGY | Facility: MEDICAL CENTER | Age: 75
End: 2025-08-26
Attending: STUDENT IN AN ORGANIZED HEALTH CARE EDUCATION/TRAINING PROGRAM
Payer: COMMERCIAL

## 2025-08-26 VITALS
TEMPERATURE: 97 F | RESPIRATION RATE: 16 BRPM | BODY MASS INDEX: 24.1 KG/M2 | SYSTOLIC BLOOD PRESSURE: 152 MMHG | DIASTOLIC BLOOD PRESSURE: 88 MMHG | HEIGHT: 72 IN | OXYGEN SATURATION: 94 % | WEIGHT: 177.91 LBS | HEART RATE: 54 BPM

## 2025-08-26 DIAGNOSIS — M79.671 RIGHT FOOT PAIN: Primary | ICD-10-CM

## 2025-08-26 LAB
ALBUMIN SERPL BCP-MCNC: 3.9 G/DL (ref 3.2–4.9)
ALBUMIN/GLOB SERPL: 1.7 G/DL
ALP SERPL-CCNC: 71 U/L (ref 30–99)
ALT SERPL-CCNC: 20 U/L (ref 2–50)
ANION GAP SERPL CALC-SCNC: 11 MMOL/L (ref 7–16)
AST SERPL-CCNC: 22 U/L (ref 12–45)
BASOPHILS # BLD AUTO: 0.3 % (ref 0–1.8)
BASOPHILS # BLD: 0.03 K/UL (ref 0–0.12)
BILIRUB SERPL-MCNC: 0.3 MG/DL (ref 0.1–1.5)
BUN SERPL-MCNC: 15 MG/DL (ref 8–22)
CALCIUM ALBUM COR SERPL-MCNC: 8.7 MG/DL (ref 8.5–10.5)
CALCIUM SERPL-MCNC: 8.6 MG/DL (ref 8.5–10.5)
CHLORIDE SERPL-SCNC: 108 MMOL/L (ref 96–112)
CO2 SERPL-SCNC: 22 MMOL/L (ref 20–33)
CREAT SERPL-MCNC: 0.72 MG/DL (ref 0.5–1.4)
EKG IMPRESSION: NORMAL
EOSINOPHIL # BLD AUTO: 0.25 K/UL (ref 0–0.51)
EOSINOPHIL NFR BLD: 2.5 % (ref 0–6.9)
ERYTHROCYTE [DISTWIDTH] IN BLOOD BY AUTOMATED COUNT: 44.6 FL (ref 35.9–50)
GFR SERPLBLD CREATININE-BSD FMLA CKD-EPI: 94 ML/MIN/1.73 M 2
GLOBULIN SER CALC-MCNC: 2.3 G/DL (ref 1.9–3.5)
GLUCOSE SERPL-MCNC: 98 MG/DL (ref 65–99)
HCT VFR BLD AUTO: 43.7 % (ref 42–52)
HGB BLD-MCNC: 15 G/DL (ref 14–18)
IMM GRANULOCYTES # BLD AUTO: 0.04 K/UL (ref 0–0.11)
IMM GRANULOCYTES NFR BLD AUTO: 0.4 % (ref 0–0.9)
LYMPHOCYTES # BLD AUTO: 2.07 K/UL (ref 1–4.8)
LYMPHOCYTES NFR BLD: 21 % (ref 22–41)
MCH RBC QN AUTO: 30.4 PG (ref 27–33)
MCHC RBC AUTO-ENTMCNC: 34.3 G/DL (ref 32.3–36.5)
MCV RBC AUTO: 88.6 FL (ref 81.4–97.8)
MONOCYTES # BLD AUTO: 0.89 K/UL (ref 0–0.85)
MONOCYTES NFR BLD AUTO: 9 % (ref 0–13.4)
NEUTROPHILS # BLD AUTO: 6.59 K/UL (ref 1.82–7.42)
NEUTROPHILS NFR BLD: 66.8 % (ref 44–72)
NRBC # BLD AUTO: 0 K/UL
NRBC BLD-RTO: 0 /100 WBC (ref 0–0.2)
PLATELET # BLD AUTO: 233 K/UL (ref 164–446)
PMV BLD AUTO: 9.9 FL (ref 9–12.9)
POTASSIUM SERPL-SCNC: 4.1 MMOL/L (ref 3.6–5.5)
PROT SERPL-MCNC: 6.2 G/DL (ref 6–8.2)
RBC # BLD AUTO: 4.93 M/UL (ref 4.7–6.1)
SODIUM SERPL-SCNC: 141 MMOL/L (ref 135–145)
WBC # BLD AUTO: 9.9 K/UL (ref 4.8–10.8)

## 2025-08-26 PROCEDURE — 80053 COMPREHEN METABOLIC PANEL: CPT

## 2025-08-26 PROCEDURE — 85025 COMPLETE CBC W/AUTO DIFF WBC: CPT

## 2025-08-26 PROCEDURE — 70496 CT ANGIOGRAPHY HEAD: CPT

## 2025-08-26 PROCEDURE — A9270 NON-COVERED ITEM OR SERVICE: HCPCS | Performed by: STUDENT IN AN ORGANIZED HEALTH CARE EDUCATION/TRAINING PROGRAM

## 2025-08-26 PROCEDURE — 700102 HCHG RX REV CODE 250 W/ 637 OVERRIDE(OP): Performed by: STUDENT IN AN ORGANIZED HEALTH CARE EDUCATION/TRAINING PROGRAM

## 2025-08-26 PROCEDURE — 36415 COLL VENOUS BLD VENIPUNCTURE: CPT

## 2025-08-26 PROCEDURE — 73630 X-RAY EXAM OF FOOT: CPT | Mod: RT

## 2025-08-26 PROCEDURE — 99284 EMERGENCY DEPT VISIT MOD MDM: CPT

## 2025-08-26 PROCEDURE — 0042T CT-CEREBRAL PERFUSION ANALYSIS: CPT

## 2025-08-26 PROCEDURE — 70498 CT ANGIOGRAPHY NECK: CPT

## 2025-08-26 PROCEDURE — 700117 HCHG RX CONTRAST REV CODE 255: Performed by: STUDENT IN AN ORGANIZED HEALTH CARE EDUCATION/TRAINING PROGRAM

## 2025-08-26 PROCEDURE — 93005 ELECTROCARDIOGRAM TRACING: CPT | Mod: TC | Performed by: STUDENT IN AN ORGANIZED HEALTH CARE EDUCATION/TRAINING PROGRAM

## 2025-08-26 RX ORDER — ACETAMINOPHEN 325 MG/1
650 TABLET ORAL ONCE
Status: COMPLETED | OUTPATIENT
Start: 2025-08-26 | End: 2025-08-26

## 2025-08-26 RX ADMIN — IOHEXOL 80 ML: 350 INJECTION, SOLUTION INTRAVENOUS at 03:39

## 2025-08-26 RX ADMIN — IOHEXOL 40 ML: 350 INJECTION, SOLUTION INTRAVENOUS at 03:38

## 2025-08-26 RX ADMIN — ACETAMINOPHEN 650 MG: 325 TABLET ORAL at 05:15

## 2025-08-26 ASSESSMENT — PAIN DESCRIPTION - PAIN TYPE: TYPE: ACUTE PAIN

## 2025-08-26 ASSESSMENT — LIFESTYLE VARIABLES
HOW MANY STANDARD DRINKS CONTAINING ALCOHOL DO YOU HAVE ON A TYPICAL DAY: 1 OR 2
AUDIT-C TOTAL SCORE: 3
HOW OFTEN DO YOU HAVE SIX OR MORE DRINKS ON ONE OCCASION: NEVER
SKIP TO QUESTIONS 9-10: 1
HOW OFTEN DO YOU HAVE A DRINK CONTAINING ALCOHOL: 2-3 TIMES A WEEK

## 2025-08-26 ASSESSMENT — FIBROSIS 4 INDEX: FIB4 SCORE: 1.87

## (undated) DEVICE — SEALER BIPOLAR 2.3 AQUAMANTYS

## (undated) DEVICE — SODIUM CHL IRRIGATION 0.9% 1000ML (12EA/CA)

## (undated) DEVICE — PACK NEURO - (2EA/CA)

## (undated) DEVICE — CHLORAPREP 26 ML APPLICATOR - ORANGE TINT(25/CA)

## (undated) DEVICE — HEADREST PRONEVIEW LARGE - (10/CA)

## (undated) DEVICE — TRAY CATHETER FOLEY URINE METER W/STATLOCK 350ML (10EA/CA)

## (undated) DEVICE — DEVICE MONOPOLAR RF PEAK PLASMABLADE 3.0S

## (undated) DEVICE — GORETEX CV-6 TT-12 D/A

## (undated) DEVICE — ELECTRODE 850 FOAM ADHESIVE - HYDROGEL RADIOTRNSPRNT (50/PK)

## (undated) DEVICE — TOWEL STOP TIMEOUT SAFETY FLAG (40EA/CA)

## (undated) DEVICE — MIDAS LUBRICATOR DIFFUSER PACK (4EA/CA)

## (undated) DEVICE — TUBING CLEARLINK DUO-VENT - C-FLO (48EA/CA)

## (undated) DEVICE — TUBING C&T SET FLYING LEADS DRAIN TUBING (10EA/BX)

## (undated) DEVICE — CANISTER SUCTION 3000ML MECHANICAL FILTER AUTO SHUTOFF MEDI-VAC NONSTERILE LF DISP  (40EA/CA)

## (undated) DEVICE — SET EXTENSION WITH 2 PORTS (48EA/CA) ***PART #2C8610 IS A SUBSTITUTE*****

## (undated) DEVICE — SUTURE 2-0 VICRYL PLUS CT-1 36 (36PK/BX)"

## (undated) DEVICE — LACTATED RINGERS INJ. 500 ML - (24EA/CA)

## (undated) DEVICE — SUTURE ETHILON 2-0 FSLX 30 (36PK/BX)"

## (undated) DEVICE — SPONGE GAUZESTER 4 X 4 4PLY - (128PK/CA)

## (undated) DEVICE — NEPTUNE 4 PORT MANIFOLD - (20/PK)

## (undated) DEVICE — GLOVE BIOGEL SZ 8 SURGICAL PF LTX - (50PR/BX 4BX/CA)

## (undated) DEVICE — SET LEADWIRE 5 LEAD BEDSIDE DISPOSABLE ECG (1SET OF 5/EA)

## (undated) DEVICE — TOOL DISSECT MATCH HEAD

## (undated) DEVICE — TIP EXTENDED DURAL SEALANT AUTOSPRAY ADHERUS (5EA/PK)

## (undated) DEVICE — SUTURE 1 VICRYL PLUS CTX - 8 X 18 INCH (12/BX)

## (undated) DEVICE — GLOVE SIZE 7.0 SURGEON ACCELERATOR FREE GREEN (50PR/BX 4BX/CA)

## (undated) DEVICE — ELECTRODE DUAL RETURN W/ CORD - (50/PK)

## (undated) DEVICE — DRAPE SURG STERI-DRAPE 7X11OD - (40EA/CA)

## (undated) DEVICE — GOWN WARMING STANDARD FLEX - (30/CA)

## (undated) DEVICE — KIT ANESTHESIA W/CIRCUIT & 3/LT BAG W/FILTER (20EA/CA)

## (undated) DEVICE — PROTECTOR ULNA NERVE - (36PR/CA)

## (undated) DEVICE — DRAPE LAPAROTOMY T SHEET - (12EA/CA)

## (undated) DEVICE — SUTURE GENERAL

## (undated) DEVICE — CATHETER ON-Q SILVER SOAKER 5IN  (5EA/CA)  - SUB ORDER #4428

## (undated) DEVICE — LACTATED RINGERS INJ 1000 ML - (14EA/CA 60CA/PF)

## (undated) DEVICE — SUCTION INSTRUMENT YANKAUER BULBOUS TIP W/O VENT (50EA/CA)

## (undated) DEVICE — SENSOR SPO2 NEO LNCS ADHESIVE (20/BX) SEE USER NOTES

## (undated) DEVICE — BLADE SURGICAL CLIPPER - (50EA/CA)

## (undated) DEVICE — ARMREST CRADLE FOAM - (2PR/PK 12PR/CA)

## (undated) DEVICE — PUMP ON-Q 270 DUAL 2ML FIXED RATE (5EA/CA)

## (undated) DEVICE — MASK ANESTHESIA ADULT  - (100/CA)

## (undated) DEVICE — KIT SURGIFLO W/OUT THROMBIN - (6EA/CA)

## (undated) DEVICE — SUTURE 2-0 VICRYL PLUS CT-1 - 8 X 18 INCH(12/BX)

## (undated) DEVICE — DERMABOND ADVANCED - (12EA/BX)

## (undated) DEVICE — GLOVE BIOGEL INDICATOR SZ 8 SURGICAL PF LTX - (50/BX 4BX/CA)

## (undated) DEVICE — SLEEVE, VASO, THIGH, MED

## (undated) DEVICE — RESERVOIR SUCTION 100 CC - SILICONE (20EA/CA)